# Patient Record
Sex: MALE | Race: OTHER | HISPANIC OR LATINO | Employment: FULL TIME | URBAN - METROPOLITAN AREA
[De-identification: names, ages, dates, MRNs, and addresses within clinical notes are randomized per-mention and may not be internally consistent; named-entity substitution may affect disease eponyms.]

---

## 2017-03-17 ENCOUNTER — OFFICE VISIT (OUTPATIENT)
Dept: MEDICAL GROUP | Age: 62
End: 2017-03-17
Payer: COMMERCIAL

## 2017-03-17 VITALS
WEIGHT: 190 LBS | HEIGHT: 68 IN | DIASTOLIC BLOOD PRESSURE: 82 MMHG | BODY MASS INDEX: 28.79 KG/M2 | HEART RATE: 72 BPM | SYSTOLIC BLOOD PRESSURE: 110 MMHG | OXYGEN SATURATION: 96 % | TEMPERATURE: 97.3 F

## 2017-03-17 DIAGNOSIS — E78.00 PURE HYPERCHOLESTEROLEMIA: ICD-10-CM

## 2017-03-17 DIAGNOSIS — J30.1 SEASONAL ALLERGIC RHINITIS DUE TO POLLEN: ICD-10-CM

## 2017-03-17 DIAGNOSIS — I10 ESSENTIAL HYPERTENSION: ICD-10-CM

## 2017-03-17 PROCEDURE — 99214 OFFICE O/P EST MOD 30 MIN: CPT | Performed by: INTERNAL MEDICINE

## 2017-03-17 RX ORDER — MOMETASONE FUROATE 50 UG/1
2 SPRAY, METERED NASAL DAILY
Qty: 1 INHALER | Refills: 3 | Status: SHIPPED | OUTPATIENT
Start: 2017-03-17 | End: 2018-03-21

## 2017-03-17 RX ORDER — FEXOFENADINE HCL 180 MG/1
180 TABLET ORAL DAILY
COMMUNITY
End: 2018-12-23

## 2017-03-17 ASSESSMENT — PAIN SCALES - GENERAL: PAINLEVEL: NO PAIN

## 2017-03-17 NOTE — PROGRESS NOTES
Subjective:   Karsten Jaime is a 61 y.o. male here today for evaluation and management of:      Seasonal allergic rhinitis due to pollen  Patient reported that he has allergy symptoms with runny nose, sinus congestion, postnasal drip and scratchy throat started last week. He reported mild dry cough from postnasal drip. He has allergy symptoms mostly in the spring and summertime. He started taking over-the-counter Allegra daily. He used to have Kenalog injection for allergy in the past. He has elevated fasting blood sugar and A1c 6 on 11/2/16. I explained to patient that Kenalog injection can increase blood sugar level and also has risks of decreased bone density if he takes Kenalog injection frequently. His allergic rhinitis can try with alternate methods first before considering having Kenalog injection. Patient completely agrees and he will try sinus rinse, Nasonex nasal spray, oral antihistamine.    Essential hypertension  His blood pressure improved lately. He is taking his blood pressure medications as instructed. He denies side effects from taking blood pressure medications.    Pure hypercholesterolemia  Patient is taking simvastatin 20 mg every afternoon. He denies side effects from taking simvastatin. His LDL at goal.         Current medicines (including changes today)  Current Outpatient Prescriptions   Medication Sig Dispense Refill   • mometasone (NASONEX) 50 MCG/ACT nasal spray Spray 2 Sprays in nose every day. 1 Inhaler 3   • fexofenadine (ALLEGRA ALLERGY) 180 MG tablet Take 180 mg by mouth every day.     • simvastatin (ZOCOR) 20 MG Tab Take 1 Tab by mouth every evening. 30 Tab 6   • valsartan (DIOVAN) 160 MG Tab Take 1 Tab by mouth every day. 30 Tab 6   • triamterene/hctz (MAXZIDE-25/DYAZIDE) 37.5-25 MG Cap Take 1 Cap by mouth every day. 30 Cap 6   • Ciclopirox 0.77 % Gel Apply once a day for great toes and fifth toes nails on both feet. 1 Tube 3   • pantoprazole (PROTONIX) 40 MG TBEC Take 40 mg  "by mouth every day.       No current facility-administered medications for this visit.     He  has a past medical history of GERD (gastroesophageal reflux disease); Hypertension; and Hyperlipidemia.    ROS   No chest pain, no shortness of breath, no abdominal pain       Objective:     Blood pressure 110/82, pulse 72, temperature 36.3 °C (97.3 °F), height 1.715 m (5' 7.5\"), weight 86.183 kg (190 lb), SpO2 96 %. Body mass index is 29.3 kg/(m^2).   Physical Exam:  General: Alert, oriented and no acute distress.  Eye contact is good, speech goal directed, affect calm  HEENT: conjunctiva non-injected, sclera non-icteric. Mild erythema and swelling on both nasal cavities with watery discharge.   Oral mucous membranes pink and moist with no lesions.  Pinna normal. TM pearly gray.   Neck No supraclavicular, submandibular, submental lymphadenopathy or masses in the neck or supraclavicular regions.  Lungs: Normal respiratory effort, clear to auscultation bilaterally with good excursion.  CV: regular rate and rhythm. No murmurs.  Abdomen: soft, non distended, nontender, Bowel sound normal.  Ext: no edema, color normal, vascularity normal, temperature normal        Assessment and Plan:   The following treatment plan was discussed     1. Seasonal allergic rhinitis due to pollen  - Discussed at length to rinse sinuses with over the counter nasal wash or Netti pot once or twice a day and then use flonase nasal spray once or twice a day after rinsing flonase nasal spray.  - Advised to try nasal steam therapy. Showed picture and discussed with patient how to do nasal steam therapy.   - He will take over-the-counter Allegra one tablet daily for 5-7 days.  - Advised to increase water intake.  - Suggest to use humidifier at home.  - mometasone (NASONEX) 50 MCG/ACT nasal spray; Spray 2 Sprays in nose every day.  Dispense: 1 Inhaler; Refill: 3    2. Essential hypertension  - Well-controlled. Continue current regimens. Recheck lab 1-2 " weeks before next follow up visit.    3. Pure hypercholesterolemia  - Well-controlled. Continue current regimens. Recheck lab 1-2 weeks before next follow up visit.  - Advised to eat low fat, low carbohydrate and high fiber diet as well as do cardio physical exercise regularly.            Followup: Return in about 8 weeks (around 5/10/2017), or if symptoms worsen or fail to improve, for hypertension, hyperlipidemia, GERD, impaired fasting glucose. Allergic rhinitis, Lab review.      Please note that this dictation was created using voice recognition software. I have made every reasonable attempt to correct obvious errors, but I expect that there may have unintended errors in text, spelling, punctuation, or grammar that I did not discover.

## 2017-03-17 NOTE — ASSESSMENT & PLAN NOTE
Patient reported that he has allergy symptoms with runny nose, sinus congestion, postnasal drip and scratchy throat started last week. He reported mild dry cough from postnasal drip. He has allergy symptoms mostly in the spring and summertime. He started taking over-the-counter Allegra daily. He used to have Kenalog injection for allergy in the past. He has elevated fasting blood sugar and A1c 6 on 11/2/16. I explained to patient that Kenalog injection can increase blood sugar level and also has risks of decreased bone density if he takes Kenalog injection frequently. His allergic rhinitis can try with alternate methods first before considering having Kenalog injection. Patient completely agrees and he will try sinus rinse, Nasonex nasal spray, oral antihistamine.

## 2017-03-17 NOTE — ASSESSMENT & PLAN NOTE
Patient is taking simvastatin 20 mg every afternoon. He denies side effects from taking simvastatin. His LDL at goal.

## 2017-03-17 NOTE — MR AVS SNAPSHOT
"Karsten Jaime   3/17/2017 9:40 AM   Office Visit   MRN: [id number]    Department:  [address] [place] [address]   Dept Phone:  [phone]    Description:  Male : 1955   Provider:  Eloisa Bolden M.D.           Reason for Visit     Allergic Rhinitis congestion, coughing    Hypertension     Hyperlipidemia           Allergies as of 3/17/2017     Allergen Noted Reactions    Padmini-[place] [Aspirin Effervescent] 2010       Aspirin 2010         You were diagnosed with     Seasonal allergic rhinitis due to pollen   [2871489]       Essential hypertension   [8369499]       Pure hypercholesterolemia   [272.0.ICD-9-CM]         Vital Signs     Blood Pressure Pulse Temperature Height Weight Body Mass Index    110/82 mmHg 72 36.3 °C (97.3 °F) 1.715 m (5' 7.5\") 86.183 kg (190 lb) 29.30 kg/m2    Oxygen Saturation Smoking Status                96% Never Smoker           Basic Information     Date Of Birth Sex Race Ethnicity Preferred Language    1955 Male  or   Origin (Faroese,Spanish,Croatian,Edwardo, etc) English      Your appointments     May 10, 2017  8:40 AM   Established Patient with Eloisa Bolden M.D.   [address] [address] [phone]           You will be receiving a confirmation call a few days before your appointment from our automated call confirmation system.              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   2014 - Present    Essential hypertension I10   2015 - Present    Gastroesophageal reflux disease without esophagitis K21.9   2016 - Present    Pure hypercholesterolemia E78.00   2016 - Present    Onychomycosis B35.1   2016 - Present    Seasonal allergic rhinitis due to pollen J30.1   5/10/2016 - Present    IFG (impaired fasting glucose) R73.01   5/10/2016 - Present    Elevated hematocrit R71.8  "  5/10/2016 - Present    Lymphocytosis D72.820   11/10/2016 - Present      Health Maintenance        Date Due Completion Dates    COLONOSCOPY 11/10/2017 11/10/2014    IMM DTaP/Tdap/Td Vaccine (2 - Td) 9/15/2024 9/15/2014            Current Immunizations     Influenza TIV (IM) 9/20/2016, 10/1/2014    Influenza Vaccine Quad Inj (Pf) 9/16/2015    Influenza Vaccine Quad Inj (Preserved) 10/9/2013, 10/19/2012, 10/12/2011    Tdap Vaccine 9/15/2014      Below and/or attached are the medications your provider expects you to take. Review all of your home medications and newly ordered medications with your provider and/or pharmacist. Follow medication instructions as directed by your provider and/or pharmacist. Please keep your medication list with you and share with your provider. Update the information when medications are discontinued, doses are changed, or new medications (including over-the-counter products) are added; and carry medication information at all times in the event of emergency situations     Allergies:  BETSY-SELTZER - (reactions not documented)     ASPIRIN - (reactions not documented)               Medications  Valid as of: March 17, 2017 - 11:22 AM    Generic Name Brand Name Tablet Size Instructions for use    Ciclopirox (Gel) Ciclopirox 0.77 % Apply once a day for great toes and fifth toes nails on both feet.        Fexofenadine HCl (Tab) ALLEGRA 180 MG Take 180 mg by mouth every day.        Mometasone Furoate (Suspension) NASONEX 50 MCG/ACT Spray 2 Sprays in nose every day.        Pantoprazole Sodium (Tablet Delayed Response) PROTONIX 40 MG Take 40 mg by mouth every day.        Simvastatin (Tab) ZOCOR 20 MG Take 1 Tab by mouth every evening.        Triamterene-HCTZ (Cap) MAXZIDE-25/DYAZIDE 37.5-25 MG Take 1 Cap by mouth every day.        Valsartan (Tab) DIOVAN 160 MG Take 1 Tab by mouth every day.        .                 Medicines prescribed today were sent to:     [id number] [place] [facility], [place] - [address]    [address] [place] [address]    Phone: [phone] Fax: [phone]    Open 24 Hours?: No      Medication refill instructions:       If your prescription bottle indicates you have medication refills left, it is not necessary to call your provider’s office. Please contact your pharmacy and they will refill your medication.    If your prescription bottle indicates you do not have any refills left, you may request refills at any time through one of the following ways: The online MicroCHIPS system (except Urgent Care), by calling your provider’s office, or by asking your pharmacy to contact your provider’s office with a refill request. Medication refills are processed only during regular business hours and may not be available until the next business day. Your provider may request additional information or to have a follow-up visit with you prior to refilling your medication.   *Please Note: Medication refills are assigned a new Rx number when refilled electronically. Your pharmacy may indicate that no refills were authorized even though a new prescription for the same medication is available at the pharmacy. Please request the medicine by name with the pharmacy before contacting your provider for a refill.           MicroCHIPS Access Code: [email or web address]  Expires: 3/31/2017  3:35 PM    MicroCHIPS  A secure, online tool to manage your health information     [employer]’s MicroCHIPS® is a secure, online tool that connects you to your personalized health information from the privacy of your home -- day or night - making it very easy for you to manage your healthcare. Once the activation process is completed, you can even access your medical information using the MicroCHIPS bessie, which is available for free in the Apple Bessie store or Google Play store.     MicroCHIPS provides the following levels of access (as shown below):   My Chart Features   [facility] Primary Care Doctor [facility]  Specialists  [facility]  Urgent  Care [facility]  Primary Care  Doctor   Email your healthcare team securely and privately 24/7 X X X    Manage appointments: schedule your next appointment; view details of past/upcoming appointments X      Request prescription refills. X      View recent personal medical records, including lab and immunizations X X X X   View health record, including health history, allergies, medications X X X X   Read reports about your outpatient visits, procedures, consult and ER notes X X X X   See your discharge summary, which is a recap of your hospital and/or ER visit that includes your diagnosis, lab results, and care plan. X X       How to register for FrameBuzz:  1. Go to  [email or web address].  2. Click on the Sign Up Now box, which takes you to the New Member Sign Up page. You will need to provide the following information:  a. Enter your FrameBuzz Access Code exactly as it appears at the top of this page. (You will not need to use this code after you’ve completed the sign-up process. If you do not sign up before the expiration date, you must request a new code.)   b. Enter your date of birth.   c. Enter your home email address.   d. Click Submit, and follow the next screen’s instructions.  3. Create a FrameBuzz ID. This will be your FrameBuzz login ID and cannot be changed, so think of one that is secure and easy to remember.  4. Create a FrameBuzz password. You can change your password at any time.  5. Enter your Password Reset Question and Answer. This can be used at a later time if you forget your password.   6. Enter your e-mail address. This allows you to receive e-mail notifications when new information is available in FrameBuzz.  7. Click Sign Up. You can now view your health information.    For assistance activating your FrameBuzz account, call [phone]

## 2017-03-17 NOTE — ASSESSMENT & PLAN NOTE
His blood pressure improved lately. He is taking his blood pressure medications as instructed. He denies side effects from taking blood pressure medications.

## 2017-04-16 DIAGNOSIS — E78.00 PURE HYPERCHOLESTEROLEMIA: ICD-10-CM

## 2017-04-16 DIAGNOSIS — I10 ESSENTIAL HYPERTENSION: ICD-10-CM

## 2017-04-17 RX ORDER — TRIAMTERENE AND HYDROCHLOROTHIAZIDE 37.5; 25 MG/1; MG/1
CAPSULE ORAL
Qty: 30 CAP | Refills: 11 | Status: SHIPPED | OUTPATIENT
Start: 2017-04-17 | End: 2018-04-11 | Stop reason: SDUPTHER

## 2017-04-17 RX ORDER — SIMVASTATIN 20 MG
TABLET ORAL
Qty: 30 TAB | Refills: 11 | Status: SHIPPED | OUTPATIENT
Start: 2017-04-17 | End: 2018-04-11 | Stop reason: SDUPTHER

## 2017-04-18 NOTE — TELEPHONE ENCOUNTER
Refill done.    Eloisa Bolden M.D.

## 2017-04-26 ENCOUNTER — HOSPITAL ENCOUNTER (OUTPATIENT)
Dept: LAB | Facility: MEDICAL CENTER | Age: 62
End: 2017-04-26
Attending: INTERNAL MEDICINE
Payer: COMMERCIAL

## 2017-04-26 DIAGNOSIS — I10 ESSENTIAL HYPERTENSION: ICD-10-CM

## 2017-04-26 DIAGNOSIS — R73.01 IFG (IMPAIRED FASTING GLUCOSE): ICD-10-CM

## 2017-04-26 DIAGNOSIS — D72.820 LYMPHOCYTOSIS: ICD-10-CM

## 2017-04-26 DIAGNOSIS — E78.00 PURE HYPERCHOLESTEROLEMIA: ICD-10-CM

## 2017-04-26 LAB
ALBUMIN SERPL BCP-MCNC: 3.8 G/DL (ref 3.2–4.9)
ALBUMIN/GLOB SERPL: 1.4 G/DL
ALP SERPL-CCNC: 74 U/L (ref 30–99)
ALT SERPL-CCNC: 50 U/L (ref 2–50)
ANION GAP SERPL CALC-SCNC: 4 MMOL/L (ref 0–11.9)
AST SERPL-CCNC: 37 U/L (ref 12–45)
BASOPHILS # BLD AUTO: 0.2 % (ref 0–1.8)
BASOPHILS # BLD: 0.01 K/UL (ref 0–0.12)
BILIRUB SERPL-MCNC: 1.3 MG/DL (ref 0.1–1.5)
BUN SERPL-MCNC: 19 MG/DL (ref 8–22)
CALCIUM SERPL-MCNC: 8.7 MG/DL (ref 8.5–10.5)
CHLORIDE SERPL-SCNC: 109 MMOL/L (ref 96–112)
CHOLEST SERPL-MCNC: 138 MG/DL (ref 100–199)
CO2 SERPL-SCNC: 27 MMOL/L (ref 20–33)
CREAT SERPL-MCNC: 0.93 MG/DL (ref 0.5–1.4)
EOSINOPHIL # BLD AUTO: 0.23 K/UL (ref 0–0.51)
EOSINOPHIL NFR BLD: 4.9 % (ref 0–6.9)
ERYTHROCYTE [DISTWIDTH] IN BLOOD BY AUTOMATED COUNT: 42.4 FL (ref 35.9–50)
EST. AVERAGE GLUCOSE BLD GHB EST-MCNC: 117 MG/DL
GFR SERPL CREATININE-BSD FRML MDRD: >60 ML/MIN/1.73 M 2
GLOBULIN SER CALC-MCNC: 2.7 G/DL (ref 1.9–3.5)
GLUCOSE SERPL-MCNC: 108 MG/DL (ref 65–99)
HAV IGM SERPL QL IA: NEGATIVE
HBA1C MFR BLD: 5.7 % (ref 0–5.6)
HBV CORE IGM SER QL: NEGATIVE
HBV SURFACE AG SER QL: NEGATIVE
HCT VFR BLD AUTO: 50 % (ref 42–52)
HCV AB SER QL: NEGATIVE
HDLC SERPL-MCNC: 38 MG/DL
HGB BLD-MCNC: 17.2 G/DL (ref 14–18)
IMM GRANULOCYTES # BLD AUTO: 0.01 K/UL (ref 0–0.11)
IMM GRANULOCYTES NFR BLD AUTO: 0.2 % (ref 0–0.9)
LDLC SERPL CALC-MCNC: 65 MG/DL
LYMPHOCYTES # BLD AUTO: 1.73 K/UL (ref 1–4.8)
LYMPHOCYTES NFR BLD: 37 % (ref 22–41)
MCH RBC QN AUTO: 30.3 PG (ref 27–33)
MCHC RBC AUTO-ENTMCNC: 34.4 G/DL (ref 33.7–35.3)
MCV RBC AUTO: 88 FL (ref 81.4–97.8)
MONOCYTES # BLD AUTO: 0.35 K/UL (ref 0–0.85)
MONOCYTES NFR BLD AUTO: 7.5 % (ref 0–13.4)
NEUTROPHILS # BLD AUTO: 2.34 K/UL (ref 1.82–7.42)
NEUTROPHILS NFR BLD: 50.2 % (ref 44–72)
NRBC # BLD AUTO: 0 K/UL
NRBC BLD AUTO-RTO: 0 /100 WBC
PLATELET # BLD AUTO: 189 K/UL (ref 164–446)
PMV BLD AUTO: 11 FL (ref 9–12.9)
POTASSIUM SERPL-SCNC: 3.6 MMOL/L (ref 3.6–5.5)
PROT SERPL-MCNC: 6.5 G/DL (ref 6–8.2)
RBC # BLD AUTO: 5.68 M/UL (ref 4.7–6.1)
SODIUM SERPL-SCNC: 140 MMOL/L (ref 135–145)
TRIGL SERPL-MCNC: 176 MG/DL (ref 0–149)
WBC # BLD AUTO: 4.7 K/UL (ref 4.8–10.8)

## 2017-04-26 PROCEDURE — 80061 LIPID PANEL: CPT

## 2017-04-26 PROCEDURE — 85025 COMPLETE CBC W/AUTO DIFF WBC: CPT

## 2017-04-26 PROCEDURE — 83036 HEMOGLOBIN GLYCOSYLATED A1C: CPT

## 2017-04-26 PROCEDURE — 80053 COMPREHEN METABOLIC PANEL: CPT

## 2017-04-26 PROCEDURE — 80074 ACUTE HEPATITIS PANEL: CPT

## 2017-04-26 PROCEDURE — 36415 COLL VENOUS BLD VENIPUNCTURE: CPT

## 2017-05-09 ENCOUNTER — TELEPHONE (OUTPATIENT)
Dept: MEDICAL GROUP | Age: 62
End: 2017-05-09

## 2017-05-09 NOTE — TELEPHONE ENCOUNTER
ESTABLISHED PATIENT PRE-VISIT PLANNING     Note: Patient will not be contacted if there is no indication to call.     1.  Reviewed note from last office visit with PCP and/or other med group provider: Yes    2.  If any orders were placed at last visit, do we have Results/Consult Notes?        •  Labs - Labs ordered, completed and results are in chart.       •  Imaging - Imaging was not ordered at last office visit.       •  Referrals - No referrals were ordered at last office visit.    3.  Immunizations were updated in [facility] using WebIZ?: Yes       •  Web Iz Recommendations: HEPATITIS A  HEPATITIS B MMR  ZOSTAVAX (Shingles)    4.  Patient is due for the following Health Maintenance Topics:   Health Maintenance Due   Topic Date Due   • IMM ZOSTER VACCINE  04/21/2017       - Patient has completed FLU and TDAP Immunization(s) per WebIZ. Chart has been updated.    5.  Patient was not informed to arrive 15 min prior to their scheduled appointment and bring in their medication bottles.

## 2017-05-10 ENCOUNTER — OFFICE VISIT (OUTPATIENT)
Dept: MEDICAL GROUP | Age: 62
End: 2017-05-10
Payer: COMMERCIAL

## 2017-05-10 VITALS
HEIGHT: 69 IN | TEMPERATURE: 98.1 F | OXYGEN SATURATION: 94 % | HEART RATE: 91 BPM | SYSTOLIC BLOOD PRESSURE: 132 MMHG | BODY MASS INDEX: 27.99 KG/M2 | DIASTOLIC BLOOD PRESSURE: 82 MMHG | WEIGHT: 189 LBS

## 2017-05-10 DIAGNOSIS — E78.00 PURE HYPERCHOLESTEROLEMIA: ICD-10-CM

## 2017-05-10 DIAGNOSIS — K21.9 GASTROESOPHAGEAL REFLUX DISEASE WITHOUT ESOPHAGITIS: ICD-10-CM

## 2017-05-10 DIAGNOSIS — R73.01 IFG (IMPAIRED FASTING GLUCOSE): ICD-10-CM

## 2017-05-10 DIAGNOSIS — I10 ESSENTIAL HYPERTENSION: ICD-10-CM

## 2017-05-10 DIAGNOSIS — Z23 NEED FOR SHINGLES VACCINE: ICD-10-CM

## 2017-05-10 PROCEDURE — 90471 IMMUNIZATION ADMIN: CPT | Performed by: INTERNAL MEDICINE

## 2017-05-10 PROCEDURE — 90736 HZV VACCINE LIVE SUBQ: CPT | Performed by: INTERNAL MEDICINE

## 2017-05-10 PROCEDURE — 99214 OFFICE O/P EST MOD 30 MIN: CPT | Mod: 25 | Performed by: INTERNAL MEDICINE

## 2017-05-10 ASSESSMENT — PAIN SCALES - GENERAL: PAINLEVEL: NO PAIN

## 2017-05-10 NOTE — ASSESSMENT & PLAN NOTE
Patient has elevated fasting sugar 108 and A1c was 5.7 on 4/26/17. Patient eats a lot of cheese and carbohydrate. He is not able to follow diabetes diet very well. He wants to try to eat more healthy diet and will start regular exercise.

## 2017-05-10 NOTE — ASSESSMENT & PLAN NOTE
Patient is taking Diovan 160 mg daily, triamterene/hydrochlorothiazide 37.5/25 mg daily. His blood pressure is well controlled with current regimens. His kidney function and electrolytes are within normal on 4/26-17.

## 2017-05-10 NOTE — ASSESSMENT & PLAN NOTE
Patient is taking simvastatin 20 mg every evening. He denies side effects from taking it. His LDL at goal but triglycerides increased. He reported eating a lot of cheese. He also drinks 2-3 beer on the weekends. We will continue simvastatin 20 mg daily and will add omega 3 1200 mg 3 times a day. He also needs to cut down cheese and eat low-fat diet and has regular exercise.

## 2017-05-10 NOTE — MR AVS SNAPSHOT
"Karsten Jaime   5/10/2017 8:40 AM   Office Visit   MRN: [id number]    Department:  [address] [place] [address]   Dept Phone:  [phone]    Description:  Male : 1955   Provider:  Eloisa Bolden M.D.           Reason for Visit     Hypertension lab review    Hyperlipidemia     Gastrophageal Reflux           Allergies as of 5/10/2017     Allergen Noted Reactions    Padmini-[place] [Aspirin Effervescent] 2010       Aspirin 2010         You were diagnosed with     Essential hypertension   [8812748]       Gastroesophageal reflux disease without esophagitis   [897901]       IFG (impaired fasting glucose)   [832070]       Pure hypercholesterolemia   [272.0.ICD-9-CM]       Need for shingles vaccine   [077369]         Vital Signs     Blood Pressure Pulse Temperature Height Weight Body Mass Index    132/82 mmHg 91 36.7 °C (98.1 °F) 1.753 m (5' 9\") 85.73 kg (189 lb) 27.90 kg/m2    Oxygen Saturation Smoking Status                94% Never Smoker           Basic Information     Date Of Birth Sex Race Ethnicity Preferred Language    1955 Male  or   Origin (Cypriot,American,Burundian,Edwardo, etc) English      Your appointments     Nov 15, 2017  8:40 AM   Established Patient with Eloisa Bolden M.D.   [address] [id number] [phone]           You will be receiving a confirmation call a few days before your appointment from our automated call confirmation system.              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   2014 - Present    Essential hypertension I10   2015 - Present    Gastroesophageal reflux disease without esophagitis K21.9   2016 - Present    Pure hypercholesterolemia E78.00   2016 - Present    Onychomycosis B35.1   2016 - Present    Seasonal allergic rhinitis due to pollen J30.1   5/10/2016 - Present   "    IFG (impaired fasting glucose) R73.01   5/10/2016 - Present    Elevated hematocrit R71.8   5/10/2016 - Present    Lymphocytosis D72.820   11/10/2016 - Present      Health Maintenance        Date Due Completion Dates    IMM ZOSTER VACCINE 4/21/2017 ---    COLONOSCOPY 11/10/2017 11/10/2014    IMM DTaP/Tdap/Td Vaccine (2 - Td) 9/15/2024 9/15/2014            Current Immunizations     Influenza TIV (IM) 9/20/2016, 10/1/2014    Influenza Vaccine Quad Inj (Pf) 9/16/2015    Influenza Vaccine Quad Inj (Preserved) 10/9/2013, 10/19/2012, 10/12/2011    SHINGLES VACCINE  Incomplete    Tdap Vaccine 9/15/2014      Below and/or attached are the medications your provider expects you to take. Review all of your home medications and newly ordered medications with your provider and/or pharmacist. Follow medication instructions as directed by your provider and/or pharmacist. Please keep your medication list with you and share with your provider. Update the information when medications are discontinued, doses are changed, or new medications (including over-the-counter products) are added; and carry medication information at all times in the event of emergency situations     Allergies:  BETSY-SELTZER - (reactions not documented)     ASPIRIN - (reactions not documented)               Medications  Valid as of: May 10, 2017 -  9:32 AM    Generic Name Brand Name Tablet Size Instructions for use    Ciclopirox (Gel) Ciclopirox 0.77 % Apply once a day for great toes and fifth toes nails on both feet.        Fexofenadine HCl (Tab) ALLEGRA 180 MG Take 180 mg by mouth every day.        Mometasone Furoate (Suspension) NASONEX 50 MCG/ACT Spray 2 Sprays in nose every day.        Pantoprazole Sodium (Tablet Delayed Response) PROTONIX 40 MG Take 40 mg by mouth every day.        Simvastatin (Tab) ZOCOR 20 MG TAKE 1 TAB BY MOUTH EVERY EVENING.        Triamterene-HCTZ (Cap) MAXZIDE-25/DYAZIDE 37.5-25 MG TAKE 1 CAPSULE BY MOUTH EVERY DAY.        Valsartan  (Tab) DIOVAN 160 MG Take 1 Tab by mouth every day.        .                 Medicines prescribed today were sent to:     [facility] [place] [facility], [place] - [address]    [address] [place] [address]    Phone: [phone] Fax: [phone]    Open 24 Hours?: No      Medication refill instructions:       If your prescription bottle indicates you have medication refills left, it is not necessary to call your provider’s office. Please contact your pharmacy and they will refill your medication.    If your prescription bottle indicates you do not have any refills left, you may request refills at any time through one of the following ways: The online Postachio system (except Urgent Care), by calling your provider’s office, or by asking your pharmacy to contact your provider’s office with a refill request. Medication refills are processed only during regular business hours and may not be available until the next business day. Your provider may request additional information or to have a follow-up visit with you prior to refilling your medication.   *Please Note: Medication refills are assigned a new Rx number when refilled electronically. Your pharmacy may indicate that no refills were authorized even though a new prescription for the same medication is available at the pharmacy. Please request the medicine by name with the pharmacy before contacting your provider for a refill.        Your To Do List     Future Labs/Procedures Complete By Expires    CBC WITH DIFFERENTIAL  As directed 5/11/2018    COMP METABOLIC PANEL  As directed 5/11/2018    HEMOGLOBIN A1C  As directed 5/11/2018    LIPID PROFILE  As directed 5/11/2018         Postachio Status: Patient Declined

## 2017-05-10 NOTE — PROGRESS NOTES
Subjective:   Karsten Jaime is a 62 y.o. male here today for evaluation and management of:      Essential hypertension  Patient is taking Diovan 160 mg daily, triamterene/hydrochlorothiazide 37.5/25 mg daily. His blood pressure is well controlled with current regimens. His kidney function and electrolytes are within normal on 4/26-17.    Gastroesophageal reflux disease without esophagitis  Patient is taking pantoprazole 40 mg daily. His symptoms is well controlled with current regimens. He denies side effects from taking pantoprazole. He did the EGD with digestive [facility] on 5/3/17 that showed that he has mild inflammation in his stomach and has a hiatal hernia. He was told to continue pantoprazole daily and recommend to follow-up with them in one year. He has colonoscopy in November 2014 and had some polyps removed. He was told to repeat colonoscopy in 3 years that will be due in November 2017.    IFG (impaired fasting glucose)  Patient has elevated fasting sugar 108 and A1c was 5.7 on 4/26/17. Patient eats a lot of cheese and carbohydrate. He is not able to follow diabetes diet very well. He wants to try to eat more healthy diet and will start regular exercise.    Pure hypercholesterolemia  Patient is taking simvastatin 20 mg every evening. He denies side effects from taking it. His LDL at goal but triglycerides increased. He reported eating a lot of cheese. He also drinks 2-3 beer on the weekends. We will continue simvastatin 20 mg daily and will add omega 3 1200 mg 3 times a day. He also needs to cut down cheese and eat low-fat diet and has regular exercise.         Current medicines (including changes today)  Current Outpatient Prescriptions   Medication Sig Dispense Refill   • triamterene/hctz (MAXZIDE-25/DYAZIDE) 37.5-25 MG Cap TAKE 1 CAPSULE BY MOUTH EVERY DAY. 30 Cap 11   • simvastatin (ZOCOR) 20 MG Tab TAKE 1 TAB BY MOUTH EVERY EVENING. 30 Tab 11   • mometasone (NASONEX) 50 MCG/ACT nasal spray Spray 2  "Sprays in nose every day. 1 Inhaler 3   • fexofenadine (ALLEGRA ALLERGY) 180 MG tablet Take 180 mg by mouth every day.     • valsartan (DIOVAN) 160 MG Tab Take 1 Tab by mouth every day. 30 Tab 6   • Ciclopirox 0.77 % Gel Apply once a day for great toes and fifth toes nails on both feet. 1 Tube 3   • pantoprazole (PROTONIX) 40 MG TBEC Take 40 mg by mouth every day.       No current facility-administered medications for this visit.     He  has a past medical history of GERD (gastroesophageal reflux disease); Hypertension; and Hyperlipidemia.    ROS   No chest pain, no shortness of breath, no abdominal pain       Objective:     Blood pressure 132/82, pulse 91, temperature 36.7 °C (98.1 °F), height 1.753 m (5' 9\"), weight 85.73 kg (189 lb), SpO2 94 %. Body mass index is 27.9 kg/(m^2).   Physical Exam:  General: Alert, oriented and no acute distress.  Eye contact is good, speech goal directed, affect calm  HEENT: conjunctiva non-injected, sclera non-icteric.  Oral mucous membranes pink and moist with no lesions.  Pinna normal.  Lungs: Normal respiratory effort, clear to auscultation bilaterally with good excursion.  CV: regular rate and rhythm. No murmurs.  Abdomen: soft, non distended, nontender, Bowel sound normal.  Ext: no edema, color normal, vascularity normal, temperature normal      Assessment and Plan:   The following treatment plan was discussed     1. Essential hypertension  - Well-controlled. Continue current regimens. Recheck lab 1-2 weeks before next follow up visit.  - Recommend to monitor blood pressure and heart rate at home.  - Advised to avoid alcohol  - CBC WITH DIFFERENTIAL; Future  - COMP METABOLIC PANEL; Future    2. Gastroesophageal reflux disease without esophagitis  - Well-controlled. Continue current regimens. Recheck lab 1-2 weeks before next follow up visit.  - Review EGD report and colonoscopy report with patient today. Recommend to avoid alcohol, acidic food and spicy food.  - COMP METABOLIC " PANEL; Future    3. IFG (impaired fasting glucose)  - Advised to eat low fat, low carbohydrate and high fiber diet as well as do cardio physical exercise regularly.   - COMP METABOLIC PANEL; Future  - HEMOGLOBIN A1C; Future    4. Pure hypercholesterolemia  - LDL at goal but triglycerides remains high. We will continue simvastatin 20 mg every afternoon. Recommend to take Omega 3.  - COMP METABOLIC PANEL; Future  - LIPID PROFILE; Future    5. Need for shingles vaccine  - Shingles vaccine was given today after reviewing risks and benefits as well as side effects of vaccine.  - VARICELLA ZOSTER VACCINE SQ      Followup: Return in about 6 months (around 11/10/2017), or if symptoms worsen or fail to improve, for hypertension, hypercholesterolemia, GERD, lab review.      Please note that this dictation was created using voice recognition software. I have made every reasonable attempt to correct obvious errors, but I expect that there may have unintended errors in text, spelling, punctuation, or grammar that I did not discover.

## 2017-05-10 NOTE — ASSESSMENT & PLAN NOTE
Patient is taking pantoprazole 40 mg daily. His symptoms is well controlled with current regimens. He denies side effects from taking pantoprazole. He did the EGD with [facility] on 5/3/17 that showed that he has mild inflammation in his stomach and has a hiatal hernia. He was told to continue pantoprazole daily and recommend to follow-up with them in one year. He has colonoscopy in November 2014 and had some polyps removed. He was told to repeat colonoscopy in 3 years that will be due in November 2017.

## 2017-06-19 DIAGNOSIS — I10 ESSENTIAL HYPERTENSION: ICD-10-CM

## 2017-06-19 RX ORDER — VALSARTAN 160 MG/1
TABLET ORAL
Qty: 30 TAB | Refills: 12 | Status: SHIPPED | OUTPATIENT
Start: 2017-06-19 | End: 2018-06-13 | Stop reason: SDUPTHER

## 2017-06-19 NOTE — TELEPHONE ENCOUNTER
Refill done.    Eloisa Bolden M.D.

## 2017-07-24 ENCOUNTER — HOSPITAL ENCOUNTER (OUTPATIENT)
Facility: MEDICAL CENTER | Age: 62
End: 2017-07-24
Attending: OPHTHALMOLOGY | Admitting: OPHTHALMOLOGY
Payer: COMMERCIAL

## 2017-07-24 VITALS
HEART RATE: 64 BPM | RESPIRATION RATE: 16 BRPM | HEIGHT: 69 IN | SYSTOLIC BLOOD PRESSURE: 129 MMHG | BODY MASS INDEX: 28.41 KG/M2 | DIASTOLIC BLOOD PRESSURE: 82 MMHG | OXYGEN SATURATION: 93 % | WEIGHT: 191.8 LBS | TEMPERATURE: 99 F

## 2017-07-24 PROBLEM — H25.11 AGE-RELATED NUCLEAR CATARACT OF RIGHT EYE: Status: ACTIVE | Noted: 2017-07-24

## 2017-07-24 PROCEDURE — 500792 HCHG KNIFE, SLIT 2.75 DUAL BEVEL ANGL: Performed by: OPHTHALMOLOGY

## 2017-07-24 PROCEDURE — 700101 HCHG RX REV CODE 250

## 2017-07-24 PROCEDURE — 160048 HCHG OR STATISTICAL LEVEL 1-5: Performed by: OPHTHALMOLOGY

## 2017-07-24 PROCEDURE — 502240 HCHG MISC OR SUPPLY RC 0272: Performed by: OPHTHALMOLOGY

## 2017-07-24 PROCEDURE — 500882 HCHG PACK, EYE CUSTOM CATARACT: Performed by: OPHTHALMOLOGY

## 2017-07-24 PROCEDURE — 501749 HCHG SHELL REV 276: Performed by: OPHTHALMOLOGY

## 2017-07-24 PROCEDURE — 99153 MOD SED SAME PHYS/QHP EA: CPT | Performed by: OPHTHALMOLOGY

## 2017-07-24 PROCEDURE — 99152 MOD SED SAME PHYS/QHP 5/>YRS: CPT | Performed by: OPHTHALMOLOGY

## 2017-07-24 PROCEDURE — 500073 HCHG BLADE, BEAVER (EYE): Performed by: OPHTHALMOLOGY

## 2017-07-24 PROCEDURE — 501539 HCHG TIP, PHACO: Performed by: OPHTHALMOLOGY

## 2017-07-24 PROCEDURE — 160029 HCHG SURGERY MINUTES - 1ST 30 MINS LEVEL 4: Performed by: OPHTHALMOLOGY

## 2017-07-24 PROCEDURE — 700111 HCHG RX REV CODE 636 W/ 250 OVERRIDE (IP)

## 2017-07-24 PROCEDURE — 160002 HCHG RECOVERY MINUTES (STAT): Performed by: OPHTHALMOLOGY

## 2017-07-24 PROCEDURE — 500855 HCHG NEEDLE, IRRIG CYSTOTOME 27G: Performed by: OPHTHALMOLOGY

## 2017-07-24 PROCEDURE — 160035 HCHG PACU - 1ST 60 MINS PHASE I: Performed by: OPHTHALMOLOGY

## 2017-07-24 DEVICE — IMPLANTABLE DEVICE: Type: IMPLANTABLE DEVICE | Status: FUNCTIONAL

## 2017-07-24 RX ORDER — APRACLONIDINE HYDROCHLORIDE 5 MG/ML
SOLUTION/ DROPS OPHTHALMIC
Status: DISCONTINUED | OUTPATIENT
Start: 2017-07-24 | End: 2017-07-24 | Stop reason: HOSPADM

## 2017-07-24 RX ORDER — PROPARACAINE HYDROCHLORIDE 5 MG/ML
SOLUTION/ DROPS OPHTHALMIC
Status: COMPLETED
Start: 2017-07-24 | End: 2017-07-24

## 2017-07-24 RX ORDER — SODIUM CHLORIDE, SODIUM LACTATE, POTASSIUM CHLORIDE, CALCIUM CHLORIDE 600; 310; 30; 20 MG/100ML; MG/100ML; MG/100ML; MG/100ML
1000 INJECTION, SOLUTION INTRAVENOUS
Status: DISCONTINUED | OUTPATIENT
Start: 2017-07-24 | End: 2017-07-24

## 2017-07-24 RX ORDER — PHENYLEPHRINE HYDROCHLORIDE 25 MG/ML
SOLUTION/ DROPS OPHTHALMIC
Status: COMPLETED
Start: 2017-07-24 | End: 2017-07-24

## 2017-07-24 RX ORDER — SODIUM CHLORIDE, SODIUM LACTATE, POTASSIUM CHLORIDE, CALCIUM CHLORIDE 600; 310; 30; 20 MG/100ML; MG/100ML; MG/100ML; MG/100ML
1000 INJECTION, SOLUTION INTRAVENOUS
Status: COMPLETED | OUTPATIENT
Start: 2017-07-24 | End: 2017-07-24

## 2017-07-24 RX ORDER — PROPARACAINE HYDROCHLORIDE 5 MG/ML
SOLUTION/ DROPS OPHTHALMIC
Status: DISCONTINUED | OUTPATIENT
Start: 2017-07-24 | End: 2017-07-24 | Stop reason: HOSPADM

## 2017-07-24 RX ORDER — LIDOCAINE HYDROCHLORIDE 10 MG/ML
INJECTION, SOLUTION EPIDURAL; INFILTRATION; INTRACAUDAL; PERINEURAL
Status: DISCONTINUED | OUTPATIENT
Start: 2017-07-24 | End: 2017-07-24 | Stop reason: HOSPADM

## 2017-07-24 RX ORDER — CYCLOPENTOLATE HYDROCHLORIDE 10 MG/ML
SOLUTION/ DROPS OPHTHALMIC
Status: COMPLETED
Start: 2017-07-24 | End: 2017-07-24

## 2017-07-24 RX ORDER — APRACLONIDINE HYDROCHLORIDE 5 MG/ML
SOLUTION/ DROPS OPHTHALMIC
Status: DISCONTINUED
Start: 2017-07-24 | End: 2017-07-24 | Stop reason: HOSPADM

## 2017-07-24 RX ORDER — MOXIFLOXACIN 5 MG/ML
SOLUTION/ DROPS OPHTHALMIC
Status: DISCONTINUED | OUTPATIENT
Start: 2017-07-24 | End: 2017-07-24 | Stop reason: HOSPADM

## 2017-07-24 RX ADMIN — SODIUM CHLORIDE, SODIUM LACTATE, POTASSIUM CHLORIDE, CALCIUM CHLORIDE 1000 ML: 600; 310; 30; 20 INJECTION, SOLUTION INTRAVENOUS at 06:45

## 2017-07-24 RX ADMIN — PROPARACAINE HYDROCHLORIDE 1 DROP: 5 SOLUTION/ DROPS OPHTHALMIC at 06:25

## 2017-07-24 RX ADMIN — CYCLOPENTOLATE HYDROCHLORIDE 1 DROP: 10 SOLUTION OPHTHALMIC at 06:25

## 2017-07-24 RX ADMIN — PHENYLEPHRINE HYDROCHLORIDE 1 DROP: 2.5 SOLUTION/ DROPS OPHTHALMIC at 06:25

## 2017-07-24 ASSESSMENT — PAIN SCALES - GENERAL: PAINLEVEL_OUTOF10: 0

## 2017-07-24 NOTE — DISCHARGE INSTRUCTIONS
HOME CARE INSTRUCTIONS FOR CATARACT SURGERY    ACTIVITY: Rest and take it easy for the first 24 hours. We strongly suggest that a responsible adult remain with you during that time. It is normal to feel sleepy. We encourage you to not do anything that requires balance, judgment or coordination. Be extra careful when walking (with a dilated eye, it is easier to trip and fall).     FOR 24 HOURS, DO NOT:       Drive, operate machinery or run household appliances.        Drink beer or alcoholic beverages.        Make important decisions or sign legal documents.     DIET: To avoid nausea, slowly advance diet as tolerated, avoiding spicy or greasy foods for the first meal.     MEDICATIONS: Resume taking daily medication. You may take Tylenol for mild discomfort, if needed.     SURGICAL DRESSING: Eye shield as instructed by your doctor. Dark glasses should be worn while in the sunlight.     Follow your Physician's instruction Sheet. Eye Kit Given    A follow-up appointment is scheduled with your doctor tomorrow at _1:00 PM______ am/pm.     You should call 911 if you develop problems with breathing or chest pain.  You should CALL YOUR PHYSICIAN if you develop: Sharp stabbing pain or sudden change in vision in your operative eye. If you are unable to contact your doctor or the surgical center, you should go to the nearest emergency room or urgent care center. Physician's telephone # ____[phone]______________________    If any questions arise, call your doctor. If your doctor is not available, please feel free to call Same Day Surgery at [phone]. You can also call the [employer] Hotline open 24 hours/day, 7 days/week and speak to a nurse at [phone] or [phone].     I acknowledge receipt and understanding of these Home Care Instructions.    ______________________________     _______________________________            Signature of Patient / Responsible Adult                                                       RN  Signature    A registered nurse may call you a few days after your surgery to see how you are doing.   You may also receive a survey in the mail within the next two weeks and we ask that you take a few moments to complete and return the survey. Our goal is to provide you with very good care and we value your comments. Thank you for choosing [facility].

## 2017-07-24 NOTE — IP AVS SNAPSHOT
7/24/2017    Karsten Jaime  [address] [place] [address]    Dear Karsten:    [facility] wants to ensure your discharge home is safe and you or your loved ones have had all of your questions answered regarding your care after you leave the hospital.    Below is a list of resources and contact information should you have any questions regarding your hospital stay, follow-up instructions, or active medical symptoms.    Questions or Concerns Regarding… Contact   Medical Questions Related to Your Discharge  (7 days a week, 8am-5pm) Contact a Nurse Care Coordinator   [phone]   Medical Questions Not Related to Your Discharge  (24 hours a day / 7 days a week)  Contact the Nurse Health Line   [phone]    Medications or Discharge Instructions Refer to your discharge packet   or contact your [facility] Primary Care Provider   [phone]   Follow-up Appointment(s) Schedule your appointment via [employer]   or contact Scheduling [phone]   Billing Review your statement via [employer]  or contact Billing [phone]   Medical Records Review your records via [employer]   or contact Medical Records [phone]     You may receive a telephone call within two days of discharge. This call is to make certain you understand your discharge instructions and have the opportunity to have any questions answered. You can also easily access your medical information, test results and upcoming appointments via the [employer] free online health management tool. You can learn more and sign up at [employer]. For assistance setting up your [employer] account, please call [phone].    Once again, we want to ensure your discharge home is safe and that you have a clear understanding of any next steps in your care. If you have any questions or concerns, please do not hesitate to contact us, we are here for you. Thank you for choosing [facility] for your healthcare needs.    Sincerely,    Your [facility] Healthcare Team

## 2017-07-24 NOTE — IP AVS SNAPSHOT
" Home Care Instructions                                                                                                                Name:Karsten Jaime  Medical Record Number:[id number]  CSN: [id number]    YOB: 1955   Age: 62 y.o.  Sex: male  HT:1.753 m (5' 9\") WT: 87 kg (191 lb 12.8 oz)          Admit Date: 7/24/2017     Discharge Date:   Today's Date: 7/24/2017  Attending Doctor:  Dwayne Ching M.D.                  Allergies:  Padmini-seltzer and Aspirin                Discharge Instructions       HOME CARE INSTRUCTIONS FOR CATARACT SURGERY    ACTIVITY: Rest and take it easy for the first 24 hours. We strongly suggest that a responsible adult remain with you during that time. It is normal to feel sleepy. We encourage you to not do anything that requires balance, judgment or coordination. Be extra careful when walking (with a dilated eye, it is easier to trip and fall).     FOR 24 HOURS, DO NOT:       Drive, operate machinery or run household appliances.        Drink beer or alcoholic beverages.        Make important decisions or sign legal documents.     DIET: To avoid nausea, slowly advance diet as tolerated, avoiding spicy or greasy foods for the first meal.     MEDICATIONS: Resume taking daily medication. You may take Tylenol for mild discomfort, if needed.     SURGICAL DRESSING: Eye shield as instructed by your doctor. Dark glasses should be worn while in the sunlight.     Follow your Physician's instruction Sheet. Eye Kit Given    A follow-up appointment is scheduled with your doctor tomorrow at _1:00 PM______ am/pm.     You should call 911 if you develop problems with breathing or chest pain.  You should CALL YOUR PHYSICIAN if you develop: Sharp stabbing pain or sudden change in vision in your operative eye. If you are unable to contact your doctor or the surgical center, you should go to the nearest emergency room or urgent care center. Physician's telephone # " ____[phone]______________________    If any questions arise, call your doctor. If your doctor is not available, please feel free to call Same Day Surgery at [phone]. You can also call the Health Hotline open 24 hours/day, 7 days/week and speak to a nurse at [phone] or [phone].     I acknowledge receipt and understanding of these Home Care Instructions.    ______________________________     _______________________________            Signature of Patient / Responsible Adult                                                       RN Signature    A registered nurse may call you a few days after your surgery to see how you are doing.   You may also receive a survey in the mail within the next two weeks and we ask that you take a few moments to complete and return the survey. Our goal is to provide you with very good care and we value your comments. Thank you for choosing [facility].        Medication List      ASK your doctor about these medications        Instructions    Morning Afternoon Evening Bedtime    ALLEGRA ALLERGY 180 MG tablet   Generic drug:  fexofenadine        Take 180 mg by mouth every day.   Dose:  180 mg                        Ciclopirox 0.77 % Gel        Apply once a day for great toes and fifth toes nails on both feet.                        mometasone 50 MCG/ACT nasal spray   Commonly known as:  NASONEX        Spray 2 Sprays in nose every day.   Dose:  2 Spray                        pantoprazole 40 MG Tbec   Commonly known as:  PROTONIX        Take 40 mg by mouth every day.   Dose:  40 mg                        simvastatin 20 MG Tabs   Commonly known as:  ZOCOR        TAKE 1 TAB BY MOUTH EVERY EVENING.                        triamterene/hctz 37.5-25 MG Caps   Commonly known as:  MAXZIDE-25/DYAZIDE        TAKE 1 CAPSULE BY MOUTH EVERY DAY.                        valsartan 160 MG Tabs   Commonly known as:  DIOVAN        TAKE 1 TABLET BY MOUTH EVERY DAY.                               Medication Information     Above and/or attached are the medications your physician expects you to take upon discharge. Review all of your home medications and newly ordered medications with your doctor and/or pharmacist. Follow medication instructions as directed by your doctor and/or pharmacist. Please keep your medication list with you and share with your physician. Update the information when medications are discontinued, doses are changed, or new medications (including over-the-counter products) are added; and carry medication information at all times in the event of emergency situations.        Resources     Quit Smoking / Tobacco Use:    I understand the use of any tobacco products increases my chance of suffering from future heart disease or stroke and could cause other illnesses which may shorten my life. Quitting the use of tobacco products is the single most important thing I can do to improve my health. For further information on smoking / tobacco cessation call a Toll Free Quit Line at 1-186.424.3033 (*[employer] [place]) or 1-662.772.5369 (American [employer]) or you can access the web based program at www.lungRoot4.org.    [place] Tobacco Users Help Line:  (640) 521-3920       Toll Free: 1-574.937.2766  Quit Tobacco Program [facility] (102)138-3388    Crisis Hotline:    [place] Crisis Hotline:  0-666-SXNQDOG or 1-910.652.6737    [place] Crisis Hotline:    1-337.998.7897 or 001-635-4104    Discharge Survey:   Thank you for choosing [facility]. We hope we did everything we could to make your hospital stay a pleasant one. You may be receiving a survey and we would appreciate your time and participation in answering the questions. Your input is very valuable to us in our efforts to improve our service to our patients and their families.            Signatures     My signature on this form indicates that:    1. I acknowledge receipt and understanding of  these Home Care Instruction.  2. My questions regarding this information have been answered to my satisfaction.  3. I have formulated a plan with my discharge nurse to obtain my prescribed medications for home.    __________________________________      __________________________________                   Patient Signature                                 Guardian/Responsible Adult Signature      __________________________________                 __________       ________                       Nurse Signature                                               Date                 Time

## 2017-07-24 NOTE — OR NURSING
0812  RECEIVED PATIENT FROM OR.  REPORT FROM DR. GARCIA.  RESPIRATIONS ARE EVEN AND UNLABORED.  OPERATIVE EYE DILATED.  TAKING PO WITHOUT DIFFICULTY.  INSTRUCTIONS GIVEN.  EYE KIT GIVEN.

## 2017-08-14 ENCOUNTER — HOSPITAL ENCOUNTER (OUTPATIENT)
Facility: MEDICAL CENTER | Age: 62
End: 2017-08-14
Attending: OPHTHALMOLOGY | Admitting: OPHTHALMOLOGY
Payer: COMMERCIAL

## 2017-08-14 VITALS
HEIGHT: 69 IN | HEART RATE: 52 BPM | OXYGEN SATURATION: 98 % | TEMPERATURE: 98.7 F | RESPIRATION RATE: 16 BRPM | WEIGHT: 192.24 LBS | BODY MASS INDEX: 28.47 KG/M2

## 2017-08-14 PROBLEM — H25.10 NUCLEAR SCLEROSIS: Status: ACTIVE | Noted: 2017-08-14

## 2017-08-14 PROCEDURE — 501836 HCHG SUTURE EYE: Performed by: OPHTHALMOLOGY

## 2017-08-14 PROCEDURE — 700101 HCHG RX REV CODE 250

## 2017-08-14 PROCEDURE — 501749 HCHG SHELL REV 276: Performed by: OPHTHALMOLOGY

## 2017-08-14 PROCEDURE — 160002 HCHG RECOVERY MINUTES (STAT): Performed by: OPHTHALMOLOGY

## 2017-08-14 PROCEDURE — 501539 HCHG TIP, PHACO: Performed by: OPHTHALMOLOGY

## 2017-08-14 PROCEDURE — 160029 HCHG SURGERY MINUTES - 1ST 30 MINS LEVEL 4: Performed by: OPHTHALMOLOGY

## 2017-08-14 PROCEDURE — 500882 HCHG PACK, EYE CUSTOM CATARACT: Performed by: OPHTHALMOLOGY

## 2017-08-14 PROCEDURE — 500792 HCHG KNIFE, SLIT 2.75 DUAL BEVEL ANGL: Performed by: OPHTHALMOLOGY

## 2017-08-14 PROCEDURE — 700111 HCHG RX REV CODE 636 W/ 250 OVERRIDE (IP)

## 2017-08-14 PROCEDURE — 99152 MOD SED SAME PHYS/QHP 5/>YRS: CPT | Performed by: OPHTHALMOLOGY

## 2017-08-14 PROCEDURE — 160035 HCHG PACU - 1ST 60 MINS PHASE I: Performed by: OPHTHALMOLOGY

## 2017-08-14 PROCEDURE — 99153 MOD SED SAME PHYS/QHP EA: CPT | Performed by: OPHTHALMOLOGY

## 2017-08-14 PROCEDURE — 500073 HCHG BLADE, BEAVER (EYE): Performed by: OPHTHALMOLOGY

## 2017-08-14 PROCEDURE — 160048 HCHG OR STATISTICAL LEVEL 1-5: Performed by: OPHTHALMOLOGY

## 2017-08-14 PROCEDURE — 500855 HCHG NEEDLE, IRRIG CYSTOTOME 27G: Performed by: OPHTHALMOLOGY

## 2017-08-14 DEVICE — IMPLANTABLE DEVICE: Type: IMPLANTABLE DEVICE | Status: FUNCTIONAL

## 2017-08-14 RX ORDER — PROPARACAINE HYDROCHLORIDE 5 MG/ML
SOLUTION/ DROPS OPHTHALMIC
Status: DISCONTINUED
Start: 2017-08-14 | End: 2017-08-14 | Stop reason: HOSPADM

## 2017-08-14 RX ORDER — TROPICAMIDE 10 MG/ML
SOLUTION/ DROPS OPHTHALMIC
Status: COMPLETED
Start: 2017-08-14 | End: 2017-08-14

## 2017-08-14 RX ORDER — PHENYLEPHRINE HYDROCHLORIDE 25 MG/ML
SOLUTION/ DROPS OPHTHALMIC
Status: COMPLETED
Start: 2017-08-14 | End: 2017-08-14

## 2017-08-14 RX ORDER — FLURBIPROFEN SODIUM 0.3 MG/ML
SOLUTION/ DROPS OPHTHALMIC
Status: COMPLETED
Start: 2017-08-14 | End: 2017-08-14

## 2017-08-14 RX ORDER — SODIUM CHLORIDE, SODIUM LACTATE, POTASSIUM CHLORIDE, CALCIUM CHLORIDE 600; 310; 30; 20 MG/100ML; MG/100ML; MG/100ML; MG/100ML
1000 INJECTION, SOLUTION INTRAVENOUS
Status: COMPLETED | OUTPATIENT
Start: 2017-08-14 | End: 2017-08-14

## 2017-08-14 RX ADMIN — PHENYLEPHRINE HYDROCHLORIDE 1 DROP: 2.5 SOLUTION/ DROPS OPHTHALMIC at 07:00

## 2017-08-14 RX ADMIN — FLURBIPROFEN SODIUM 1 DROP: 0.3 SOLUTION/ DROPS OPHTHALMIC at 07:00

## 2017-08-14 RX ADMIN — SODIUM CHLORIDE, SODIUM LACTATE, POTASSIUM CHLORIDE, CALCIUM CHLORIDE 1000 ML: 600; 310; 30; 20 INJECTION, SOLUTION INTRAVENOUS at 07:00

## 2017-08-14 RX ADMIN — TROPICAMIDE 1 DROP: 10 SOLUTION/ DROPS OPHTHALMIC at 07:00

## 2017-08-14 ASSESSMENT — PAIN SCALES - GENERAL: PAINLEVEL_OUTOF10: 0

## 2017-08-14 NOTE — OP REPORT
DATE OF SERVICE:  08/14/2017    PROCEDURE:  Phaco IOL, left eye.    PREOPERATIVE DIAGNOSIS:  Cataract, left eye.    POSTOPERATIVE DIAGNOSIS:  Cataract, left eye.    SURGEON:  Dwayne Ching MD    ASSISTANT:  None.    COMPLICATIONS:  None.    ESTIMATED BLOOD LOSS:  None.    ANESTHESIA:  Topical with MAC per Dr. Carr.     PROCEDURE IN DETAIL:  After appropriate consents were obtained, the patient   was brought to the operating room and then prepared and draped in the usual   sterile fashion for ophthalmology.  Lid speculum was placed in the left eye   after which a supersharp was used to make a stab incision at the 4 o'clock   position through which 2% preservative-free Xylocaine and Viscoat was injected   followed by a 2.75 mm stab incision at the 2 o'clock position through which   cystotome and Utrata made a 360 degree capsulorrhexis followed by   hydrodissection and spinning of the nucleus with BSS and a blunt cannula.   Phaco removed the lens.  I and A of the cortex. An SN60WF 19.5 was placed into   the posterior capsule with Provisc.  All the viscoelastics were removed with   I and A.  Lens was centered.  Lid speculum was removed under the microscope   after the wounds had been hydrated with BSS on a blunt cannula noted to be   free of leak and a lid speculum in the anterior chamber remained soft and well   formed.     LENS TYPE:  ZCBOO 22.0       ____________________________________     Dwayne Ching MD    [facility] / [facility]    DD:  08/14/2017 10:29:22  DT:  08/14/2017 10:43:42    D#:  [id number]  Job#:  [id number]

## 2017-08-14 NOTE — IP AVS SNAPSHOT
" Home Care Instructions                                                                                                                Name:Karsten Jaime  Medical Record Number:[id number]  CSN: [id number]    YOB: 1955   Age: 62 y.o.  Sex: male  HT:1.753 m (5' 9\") WT: 87.2 kg (192 lb 3.9 oz)          Admit Date: 8/14/2017     Discharge Date:   Today's Date: 8/14/2017  Attending Doctor:  Dwayne Ching M.D.                  Allergies:  Padmini-seltzer and Aspirin                Discharge Instructions       HOME CARE INSTRUCTIONS FOR CATARACT SURGERY    ACTIVITY: Rest and take it easy for the first 24 hours. We strongly suggest that a responsible adult remain with you during that time. It is normal to feel sleepy. We encourage you to not do anything that requires balance, judgment or coordination. Be extra careful when walking (with a dilated eye, it is easier to trip and fall).     FOR 24 HOURS, DO NOT:       Drive, operate machinery or run household appliances.        Drink beer or alcoholic beverages.        Make important decisions or sign legal documents.     DIET: To avoid nausea, slowly advance diet as tolerated, avoiding spicy or greasy foods for the first meal.     MEDICATIONS: Resume taking daily medication. You may take Tylenol for mild discomfort, if needed.     SURGICAL DRESSING: Eye shield as instructed by your doctor. Dark glasses should be worn while in the sunlight.     Follow your Physician's instruction Sheet. Eye Kit Given    A follow-up appointment is scheduled with your doctor tomorrow at 1:00 pm.     You should call 911 if you develop problems with breathing or chest pain.  You should CALL YOUR PHYSICIAN if you develop: Sharp stabbing pain or sudden change in vision in your operative eye. If you are unable to contact your doctor or the surgical center, you should go to the nearest emergency room or urgent care center.   Physician's telephone # [phone]    If any questions arise, call " your doctor. If your doctor is not available, please feel free to call Same Day Surgery at [phone]. You can also call the Health Hotline open 24 hours/day, 7 days/week and speak to a nurse at [phone] or [phone].     I acknowledge receipt and understanding of these Home Care Instructions.    ______________________________     _______________________________            Signature of Patient / Responsible Adult                                                       RN Signature    A registered nurse may call you a few days after your surgery to see how you are doing.   You may also receive a survey in the mail within the next two weeks and we ask that you take a few moments to complete and return the survey. Our goal is to provide you with very good care and we value your comments. Thank you for choosing [facility].        Medication List      ASK your doctor about these medications        Instructions    Morning Afternoon Evening Bedtime    ALLEGRA ALLERGY 180 MG tablet   Generic drug:  fexofenadine        Take 180 mg by mouth every day.   Dose:  180 mg                        Ciclopirox 0.77 % Gel        Apply once a day for great toes and fifth toes nails on both feet.                        mometasone 50 MCG/ACT nasal spray   Commonly known as:  NASONEX        Spray 2 Sprays in nose every day.   Dose:  2 Spray                        pantoprazole 40 MG Tbec   Commonly known as:  PROTONIX        Take 40 mg by mouth every day.   Dose:  40 mg                        simvastatin 20 MG Tabs   Commonly known as:  ZOCOR        TAKE 1 TAB BY MOUTH EVERY EVENING.                        triamterene/hctz 37.5-25 MG Caps   Commonly known as:  MAXZIDE-25/DYAZIDE        TAKE 1 CAPSULE BY MOUTH EVERY DAY.                        valsartan 160 MG Tabs   Commonly known as:  DIOVAN        TAKE 1 TABLET BY MOUTH EVERY DAY.                                Medication Information     Above and/or attached are  the medications your physician expects you to take upon discharge. Review all of your home medications and newly ordered medications with your doctor and/or pharmacist. Follow medication instructions as directed by your doctor and/or pharmacist. Please keep your medication list with you and share with your physician. Update the information when medications are discontinued, doses are changed, or new medications (including over-the-counter products) are added; and carry medication information at all times in the event of emergency situations.        Resources     Quit Smoking / Tobacco Use:    I understand the use of any tobacco products increases my chance of suffering from future heart disease or stroke and could cause other illnesses which may shorten my life. Quitting the use of tobacco products is the single most important thing I can do to improve my health. For further information on smoking / tobacco cessation call a Toll Free Quit Line at 1-807.200.2142 (*[employer]) or 1-565.813.3770 (American [employer]) or you can access the web based program at www.lungFinexkap.org.    [place] Tobacco Users Help Line:  (694) 143-2589       Toll Free: 1-916.743.5844  Quit Tobacco Program [facility] [phone]    Crisis Hotline:    [place] Crisis Hotline:  9-134-TPBPXUV or 1-101.723.7819    [place] Crisis Hotline:    1-379.949.7491 or 900-694-4712    Discharge Survey:   Thank you for choosing [facility]. We hope we did everything we could to make your hospital stay a pleasant one. You may be receiving a survey and we would appreciate your time and participation in answering the questions. Your input is very valuable to us in our efforts to improve our service to our patients and their families.            Signatures     My signature on this form indicates that:    1. I acknowledge receipt and understanding of these Home Care Instruction.  2. My questions regarding this  information have been answered to my satisfaction.  3. I have formulated a plan with my discharge nurse to obtain my prescribed medications for home.    __________________________________      __________________________________                   Patient Signature                                 Guardian/Responsible Adult Signature      __________________________________                 __________       ________                       Nurse Signature                                               Date                 Time

## 2017-08-14 NOTE — IP AVS SNAPSHOT
8/14/2017    Karsten Jaime  [address] [place] [address]    Dear Karsten:    [facility] wants to ensure your discharge home is safe and you or your loved ones have had all of your questions answered regarding your care after you leave the hospital.    Below is a list of resources and contact information should you have any questions regarding your hospital stay, follow-up instructions, or active medical symptoms.    Questions or Concerns Regarding… Contact   Medical Questions Related to Your Discharge  (7 days a week, 8am-5pm) Contact a Nurse Care Coordinator   [phone]   Medical Questions Not Related to Your Discharge  (24 hours a day / 7 days a week)  Contact the Nurse Health Line   [phone]    Medications or Discharge Instructions Refer to your discharge packet   or contact your [facility] Primary Care Provider   [phone]   Follow-up Appointment(s) Schedule your appointment via Mind[employer]   or contact Scheduling [phone]   Billing Review your statement via Mindjet  or contact Billing [phone]   Medical Records Review your records via Mindjet   or contact Medical Records [phone]     You may receive a telephone call within two days of discharge. This call is to make certain you understand your discharge instructions and have the opportunity to have any questions answered. You can also easily access your medical information, test results and upcoming appointments via the Mind[employer] free online health management tool. You can learn more and sign up at [employer]/Mindjet. For assistance setting up your Mindjet account, please call [phone].    Once again, we want to ensure your discharge home is safe and that you have a clear understanding of any next steps in your care. If you have any questions or concerns, please do not hesitate to contact us, we are here for you. Thank you for choosing [facility] for your healthcare needs.    Sincerely,    Your [facility] Healthcare Team

## 2017-08-14 NOTE — DISCHARGE INSTRUCTIONS
HOME CARE INSTRUCTIONS FOR CATARACT SURGERY    ACTIVITY: Rest and take it easy for the first 24 hours. We strongly suggest that a responsible adult remain with you during that time. It is normal to feel sleepy. We encourage you to not do anything that requires balance, judgment or coordination. Be extra careful when walking (with a dilated eye, it is easier to trip and fall).     FOR 24 HOURS, DO NOT:       Drive, operate machinery or run household appliances.        Drink beer or alcoholic beverages.        Make important decisions or sign legal documents.     DIET: To avoid nausea, slowly advance diet as tolerated, avoiding spicy or greasy foods for the first meal.     MEDICATIONS: Resume taking daily medication. You may take Tylenol for mild discomfort, if needed.     SURGICAL DRESSING: Eye shield as instructed by your doctor. Dark glasses should be worn while in the sunlight.     Follow your Physician's instruction Sheet. Eye Kit Given    A follow-up appointment is scheduled with your doctor tomorrow at 1:00 pm.     You should call 911 if you develop problems with breathing or chest pain.  You should CALL YOUR PHYSICIAN if you develop: Sharp stabbing pain or sudden change in vision in your operative eye. If you are unable to contact your doctor or the surgical center, you should go to the nearest emergency room or urgent care center.   Physician's telephone # [phone]    If any questions arise, call your doctor. If your doctor is not available, please feel free to call Same Day Surgery at [phone]. You can also call the [facility] Hotline open 24 hours/day, 7 days/week and speak to a nurse at [phone] or [phone].     I acknowledge receipt and understanding of these Home Care Instructions.    ______________________________     _______________________________            Signature of Patient / Responsible Adult                                                       RN Signature    A registered nurse may  call you a few days after your surgery to see how you are doing.   You may also receive a survey in the mail within the next two weeks and we ask that you take a few moments to complete and return the survey. Our goal is to provide you with very good care and we value your comments. Thank you for choosing [facility].

## 2017-08-14 NOTE — OR NURSING
Received from OR.  Operative eye dilated.  VSS  Patient without complaint.  Patient given juice.  Family at side.  Discharge instructions to patient and family

## 2017-09-22 NOTE — OP REPORT
PROCEDURE: PHACO IOL OS  PRE OP DIAGNOSIS: NUCLEAR CATARACT LEFT EYE  POST OP DIAGNOSIS: SAME  SURGEON: HANNA HUIZAR MD  ASSISTANT: NONE  COMPLICATIONS: NONE  ESTIMATED BLOOD LOSS: NONE  ANESTHESIA: TOPICAL WITH MAC  PROCEDURE IN DETAIL: AFTER APPROPRIATE CONSENTS, PATIENT WAS BROUGHT TO THE OPERATING ROOM AND THEN PREPARED AND DRAPED IN THE USUAL STERILE FASHION. A LID SPECULUM PLACED, SUPER SHARP 0.7MM INCISION MADE AT 4 OCLOCK [place], THROUGH WHICH 1% PF LIDOCAINE AND VISCOAT WERE GIVEN. 2.75 MM KERATOME MADE SIMILAR INCISION AT 2 OCLOCK, THROUGH WHICH CYSTOTOME WAS USED TO MAKE A 360 DEGREE 6MM CAPSULAR OPENING FOLLOWED BY HYDRODISSECTION OF THE NUCLEUS WITH SPINNING AND REMOVAL OF THE LENS BY TRISECTION AND PHACOEMULSIFICATION FOLLOWED BY I+A. ZCBOO 22.0 IOL WAS PLACED ALL WOUNDS HYDRATED AND NOTED TO BE FREE OF LEAK, LID SPECULUM REMOVED AND PATIENT WAS FOLLOWED TO POST OP WHERE HE WAS NOTED TO BE IN GOOD CONDITION.

## 2017-10-24 ENCOUNTER — TELEPHONE (OUTPATIENT)
Dept: MEDICAL GROUP | Age: 62
End: 2017-10-24

## 2017-10-24 NOTE — TELEPHONE ENCOUNTER
ESTABLISHED PATIENT PRE-VISIT PLANNING     Note: Patient will not be contacted if there is no indication to call.     1.  Reviewed notes from the last few office visits within the medical group: Yes    2.  If any orders were placed at last visit or intended to be done for this visit (i.e. 6 mos follow-up), do we have Results/Consult Notes?        •  Labs - Labs ordered, but not to be completed until 11/15.   Note: If patient appointment is for lab review and patient did not complete labs,                check with provider if OK to reschedule patient until labs completed.       •  Imaging - Imaging was not ordered at last office visit.       •  Referrals - No referrals were ordered at last office visit.    3. Is this appointment scheduled as a Hospital Follow-Up? No    4.  Immunizations were updated in Epic using WebIZ?: Epic matches WebIZ       •  Web Iz Recommendations: FLU, MMR  and TD    5.  Patient is due for the following Health Maintenance Topics:   Health Maintenance Due   Topic Date Due   • IMM INFLUENZA (1) 09/01/2017   • COLONOSCOPY  11/10/2017           6.  Patient was NOT informed to arrive 15 min prior to their scheduled appointment and bring in their medication bottles.

## 2017-10-25 ENCOUNTER — OFFICE VISIT (OUTPATIENT)
Dept: MEDICAL GROUP | Age: 62
End: 2017-10-25
Payer: COMMERCIAL

## 2017-10-25 VITALS
RESPIRATION RATE: 18 BRPM | HEART RATE: 74 BPM | HEIGHT: 69 IN | WEIGHT: 190.4 LBS | SYSTOLIC BLOOD PRESSURE: 116 MMHG | DIASTOLIC BLOOD PRESSURE: 86 MMHG | TEMPERATURE: 98.4 F | OXYGEN SATURATION: 96 % | BODY MASS INDEX: 28.2 KG/M2

## 2017-10-25 DIAGNOSIS — R42 POSTURAL DIZZINESS WITH PRESYNCOPE: ICD-10-CM

## 2017-10-25 DIAGNOSIS — E78.00 PURE HYPERCHOLESTEROLEMIA: ICD-10-CM

## 2017-10-25 DIAGNOSIS — I10 ESSENTIAL HYPERTENSION: ICD-10-CM

## 2017-10-25 DIAGNOSIS — R55 POSTURAL DIZZINESS WITH PRESYNCOPE: ICD-10-CM

## 2017-10-25 PROBLEM — I95.1 POSTURAL HYPOTENSION: Status: ACTIVE | Noted: 2017-10-25

## 2017-10-25 PROCEDURE — 99214 OFFICE O/P EST MOD 30 MIN: CPT | Performed by: INTERNAL MEDICINE

## 2017-10-25 NOTE — PROGRESS NOTES
Subjective:   Karsten Jaime is a 62 y.o. male here today for evaluation and management of:      Postural dizziness with presyncope  This is a new problem to me. Patient reported that he has intermittent dizziness during postural change from lying to standing over a year. He had severe dizziness and almost fainting yesterday after he was treated with rolling massage on the rolling bed at chiropractor office. He caught himself before he fell. He denied head injury. Symptoms last for a few seconds. He also had similar experience after chiropractor treatment a few months ago. He also noticed dizziness when he gets up from lying down a few months ago. He denied chest pain, palpitation or skipped beat or confusion, slurred speech, diplopia, weakness on one side of the body during or after dizzy spell. He denies history of loss of consciousness. He denied history of head injury.    Essential hypertension  Patient is taking same antihypertensive medication for many years. He is taking Diovan 160 mg daily and triamterene-hydrochlorothiazide 37.5-25 mg daily as instructed. His blood pressure has been stable with current regimens. He denies side effects from taking current regimens.    Pure hypercholesterolemia  Patient is taking simvastatin 20 mg every evening. His LDL at goal but triglycerides still high. We discussed to take omega-3. However, he has not started taking it yet. He will have omega-3 supplements and take it daily.         Current medicines (including changes today)  Current Outpatient Prescriptions   Medication Sig Dispense Refill   • valsartan (DIOVAN) 160 MG Tab TAKE 1 TABLET BY MOUTH EVERY DAY. 30 Tab 12   • triamterene/hctz (MAXZIDE-25/DYAZIDE) 37.5-25 MG Cap TAKE 1 CAPSULE BY MOUTH EVERY DAY. 30 Cap 11   • simvastatin (ZOCOR) 20 MG Tab TAKE 1 TAB BY MOUTH EVERY EVENING. 30 Tab 11   • mometasone (NASONEX) 50 MCG/ACT nasal spray Spray 2 Sprays in nose every day. 1 Inhaler 3   • fexofenadine (ALLEGRA  "ALLERGY) 180 MG tablet Take 180 mg by mouth every day.     • Ciclopirox 0.77 % Gel Apply once a day for great toes and fifth toes nails on both feet. 1 Tube 3   • pantoprazole (PROTONIX) 40 MG TBEC Take 40 mg by mouth every day.       No current facility-administered medications for this visit.      He  has a past medical history of GERD (gastroesophageal reflux disease); Hyperlipidemia; and Hypertension.    ROS   No chest pain, no shortness of breath, no abdominal pain       Objective:     Blood pressure 116/86, pulse 74, temperature 36.9 °C (98.4 °F), resp. rate 18, height 1.753 m (5' 9.02\"), weight 86.4 kg (190 lb 6.4 oz), SpO2 96 %. Body mass index is 28.1 kg/m².   Physical Exam:  General: Alert, oriented and no acute distress.  Eye contact is good, speech goal directed, affect calm  HEENT: conjunctiva non-injected, sclera non-icteric.  Oral mucous membranes pink and moist with no lesions.  Pinna normal.   Lungs: Normal respiratory effort, clear to auscultation bilaterally with good excursion.  CV: regular rate and rhythm. No murmurs. No carotid bruits.  Abdomen: soft, non distended, nontender, Bowel sound normal.  Ext: no edema, color normal, vascularity normal, temperature normal  Neurologic exam: No pronator drift. No facial droop, muscle strength 5 out of 5 throughout on both upper and lower extremities, normal gait, cranial nerves grossly normal.      Assessment and Plan:   The following treatment plan was discussed     1. Postural dizziness with presyncope  - Discussed possible causes of patient including but not limited to postural hypotension, Carotid stenosis, posterior circulation problem.  - No neurological deficit on exam  - Advised to get up slowly from sitting or lying down. Advised to check blood pressure regularly. Advised to drink enough water for hydration.  - Patient does not want to do CT angiogram head and neck at this moment.   - Ordered carotid ultrasound and echocardiogram for further " evaluation.  - US-CAROTID DOPPLER; Future  - ECHOCARDIOGRAM COMP W/O CONT; Future    2. Essential hypertension  - Stable. Continue current regimens. Advised to check blood pressure and pulse regularly at home. Advised to bring the readings to next visit.  - US-CAROTID DOPPLER; Future  - ECHOCARDIOGRAM COMP W/O CONT; Future    3. Pure hypercholesterolemia  - LDL at goal. Triglycerides remains high. Advised to continue simvastatin 20 mg every afternoon. Advised to take omega-3 1000 mg twice a day with meal.  - Advised to eat low fat, low carbohydrate and high fiber diet as well as do cardio physical exercise regularly.   - US-CAROTID DOPPLER; Future  - ECHOCARDIOGRAM COMP W/O CONT; Future    4. Health Maintenance   - Patient received flu vaccine on 9/19/17.     Followup: Return in about 3 months (around 1/25/2018), or if symptoms worsen or fail to improve, for hypertension, hyperlipidemia, postural dizziness, GERD, lab review.      Please note that this dictation was created using voice recognition software. I have made every reasonable attempt to correct obvious errors, but I expect that there may have unintended errors in text, spelling, punctuation, or grammar that I did not discover.

## 2017-10-25 NOTE — ASSESSMENT & PLAN NOTE
This is a new problem to me. Patient reported that he has intermittent dizziness during postural change from lying to standing over a year. He had severe dizziness and almost fainting yesterday after he was treated with rolling massage on the rolling bed at chiropractor office. He caught himself before he fell. He denied head injury. Symptoms last for a few seconds. He also had similar experience after chiropractor treatment a few months ago. He also noticed dizziness when he gets up from lying down a few months ago. He denied chest pain, palpitation or skipped beat or confusion, slurred speech, diplopia, weakness on one side of the body during or after dizzy spell. He denies history of loss of consciousness. He denied history of head injury.

## 2017-10-25 NOTE — ASSESSMENT & PLAN NOTE
Patient is taking same antihypertensive medication for many years. He is taking Diovan 160 mg daily and triamterene-hydrochlorothiazide 37.5-25 mg daily as instructed. His blood pressure has been stable with current regimens. He denies side effects from taking current regimens.

## 2017-10-25 NOTE — ASSESSMENT & PLAN NOTE
Patient is taking simvastatin 20 mg every evening. His LDL at goal but triglycerides still high. We discussed to take omega-3. However, he has not started taking it yet. He will have omega-3 supplements and take it daily.

## 2017-11-06 ENCOUNTER — HOSPITAL ENCOUNTER (OUTPATIENT)
Dept: LAB | Facility: MEDICAL CENTER | Age: 62
End: 2017-11-06
Attending: INTERNAL MEDICINE
Payer: COMMERCIAL

## 2017-11-06 DIAGNOSIS — R73.01 IFG (IMPAIRED FASTING GLUCOSE): ICD-10-CM

## 2017-11-06 DIAGNOSIS — K21.9 GASTROESOPHAGEAL REFLUX DISEASE WITHOUT ESOPHAGITIS: ICD-10-CM

## 2017-11-06 DIAGNOSIS — E78.00 PURE HYPERCHOLESTEROLEMIA: ICD-10-CM

## 2017-11-06 DIAGNOSIS — I10 ESSENTIAL HYPERTENSION: ICD-10-CM

## 2017-11-06 LAB
ALBUMIN SERPL BCP-MCNC: 3.7 G/DL (ref 3.2–4.9)
ALBUMIN/GLOB SERPL: 1.2 G/DL
ALP SERPL-CCNC: 92 U/L (ref 30–99)
ALT SERPL-CCNC: 37 U/L (ref 2–50)
ANION GAP SERPL CALC-SCNC: 6 MMOL/L (ref 0–11.9)
AST SERPL-CCNC: 23 U/L (ref 12–45)
BASOPHILS # BLD AUTO: 0.5 % (ref 0–1.8)
BASOPHILS # BLD: 0.03 K/UL (ref 0–0.12)
BILIRUB SERPL-MCNC: 0.7 MG/DL (ref 0.1–1.5)
BUN SERPL-MCNC: 18 MG/DL (ref 8–22)
CALCIUM SERPL-MCNC: 9.1 MG/DL (ref 8.5–10.5)
CHLORIDE SERPL-SCNC: 107 MMOL/L (ref 96–112)
CHOLEST SERPL-MCNC: 117 MG/DL (ref 100–199)
CO2 SERPL-SCNC: 27 MMOL/L (ref 20–33)
CREAT SERPL-MCNC: 1.11 MG/DL (ref 0.5–1.4)
EOSINOPHIL # BLD AUTO: 0.24 K/UL (ref 0–0.51)
EOSINOPHIL NFR BLD: 4.1 % (ref 0–6.9)
ERYTHROCYTE [DISTWIDTH] IN BLOOD BY AUTOMATED COUNT: 43.6 FL (ref 35.9–50)
EST. AVERAGE GLUCOSE BLD GHB EST-MCNC: 128 MG/DL
GFR SERPL CREATININE-BSD FRML MDRD: >60 ML/MIN/1.73 M 2
GLOBULIN SER CALC-MCNC: 3.1 G/DL (ref 1.9–3.5)
GLUCOSE SERPL-MCNC: 113 MG/DL (ref 65–99)
HBA1C MFR BLD: 6.1 % (ref 0–5.6)
HCT VFR BLD AUTO: 49.3 % (ref 42–52)
HDLC SERPL-MCNC: 39 MG/DL
HGB BLD-MCNC: 17.1 G/DL (ref 14–18)
IMM GRANULOCYTES # BLD AUTO: 0.01 K/UL (ref 0–0.11)
IMM GRANULOCYTES NFR BLD AUTO: 0.2 % (ref 0–0.9)
LDLC SERPL CALC-MCNC: 54 MG/DL
LYMPHOCYTES # BLD AUTO: 2.16 K/UL (ref 1–4.8)
LYMPHOCYTES NFR BLD: 37 % (ref 22–41)
MCH RBC QN AUTO: 31 PG (ref 27–33)
MCHC RBC AUTO-ENTMCNC: 34.7 G/DL (ref 33.7–35.3)
MCV RBC AUTO: 89.5 FL (ref 81.4–97.8)
MONOCYTES # BLD AUTO: 0.46 K/UL (ref 0–0.85)
MONOCYTES NFR BLD AUTO: 7.9 % (ref 0–13.4)
NEUTROPHILS # BLD AUTO: 2.94 K/UL (ref 1.82–7.42)
NEUTROPHILS NFR BLD: 50.3 % (ref 44–72)
NRBC # BLD AUTO: 0 K/UL
NRBC BLD AUTO-RTO: 0 /100 WBC
PLATELET # BLD AUTO: 204 K/UL (ref 164–446)
PMV BLD AUTO: 11.2 FL (ref 9–12.9)
POTASSIUM SERPL-SCNC: 3.4 MMOL/L (ref 3.6–5.5)
PROT SERPL-MCNC: 6.8 G/DL (ref 6–8.2)
RBC # BLD AUTO: 5.51 M/UL (ref 4.7–6.1)
SODIUM SERPL-SCNC: 140 MMOL/L (ref 135–145)
TRIGL SERPL-MCNC: 121 MG/DL (ref 0–149)
WBC # BLD AUTO: 5.8 K/UL (ref 4.8–10.8)

## 2017-11-06 PROCEDURE — 83036 HEMOGLOBIN GLYCOSYLATED A1C: CPT

## 2017-11-06 PROCEDURE — 80053 COMPREHEN METABOLIC PANEL: CPT

## 2017-11-06 PROCEDURE — 80061 LIPID PANEL: CPT

## 2017-11-06 PROCEDURE — 85025 COMPLETE CBC W/AUTO DIFF WBC: CPT

## 2017-11-06 PROCEDURE — 36415 COLL VENOUS BLD VENIPUNCTURE: CPT

## 2017-11-09 ENCOUNTER — HOSPITAL ENCOUNTER (OUTPATIENT)
Dept: RADIOLOGY | Facility: MEDICAL CENTER | Age: 62
End: 2017-11-09
Attending: INTERNAL MEDICINE
Payer: COMMERCIAL

## 2017-11-09 ENCOUNTER — HOSPITAL ENCOUNTER (OUTPATIENT)
Dept: CARDIOLOGY | Facility: MEDICAL CENTER | Age: 62
End: 2017-11-09
Attending: INTERNAL MEDICINE
Payer: COMMERCIAL

## 2017-11-09 DIAGNOSIS — E78.00 PURE HYPERCHOLESTEROLEMIA: ICD-10-CM

## 2017-11-09 DIAGNOSIS — R42 POSTURAL DIZZINESS WITH PRESYNCOPE: ICD-10-CM

## 2017-11-09 DIAGNOSIS — I10 ESSENTIAL HYPERTENSION: ICD-10-CM

## 2017-11-09 DIAGNOSIS — R55 POSTURAL DIZZINESS WITH PRESYNCOPE: ICD-10-CM

## 2017-11-09 LAB
LV EJECT FRACT  99904: 65
LV EJECT FRACT MOD 2C 99903: 74.34
LV EJECT FRACT MOD 4C 99902: 63.52
LV EJECT FRACT MOD BP 99901: 66.9

## 2017-11-09 PROCEDURE — 93880 EXTRACRANIAL BILAT STUDY: CPT

## 2017-11-09 PROCEDURE — 93306 TTE W/DOPPLER COMPLETE: CPT

## 2017-11-14 ENCOUNTER — TELEPHONE (OUTPATIENT)
Dept: MEDICAL GROUP | Age: 62
End: 2017-11-14

## 2017-11-14 NOTE — TELEPHONE ENCOUNTER
ESTABLISHED PATIENT PRE-VISIT PLANNING     Note: Patient will not be contacted if there is no indication to call.     1.  Reviewed notes from the last few office visits within the medical group: Yes    2.  If any orders were placed at last visit or intended to be done for this visit (i.e. 6 mos follow-up), do we have Results/Consult Notes?        •  Labs - Labs ordered, completed on 11/6 and results are in chart.   Note: If patient appointment is for lab review and patient did not complete labs, check with provider if OK to reschedule patient until labs completed.       •  Imaging - Imaging ordered, completed and results are in chart.       •  Referrals - No referrals were ordered at last office visit.    3. Is this appointment scheduled as a Hospital Follow-Up? No    4.  Immunizations were updated in Epic using WebIZ?: Epic matches WebIZ       •  Web Iz Recommendations: MMR  and TD    5.  Patient is due for the following Health Maintenance Topics:   Health Maintenance Due   Topic Date Due   • COLONOSCOPY  11/10/2017           6.  Patient was NOT informed to arrive 15 min prior to their scheduled appointment and bring in their medication bottles.

## 2017-11-14 NOTE — ASSESSMENT & PLAN NOTE
He is taking valsartan 160 mg daily and triamterene-hydrochlorothiazide 37.5-25 mg daily. He denies side effects from taking his current blood pressure medications. His kidney functions are stable. He has slightly low potassium at 3.4. We discussed to eat high potassium diet and over-the-counter potassium supplement.

## 2017-11-14 NOTE — ASSESSMENT & PLAN NOTE
Patient has prediabetes with fasting blood sugar 113 and A1c 6.1 on 11/6/17. Patient is not able to control his carbohydrate intake. He likes to eat a lot of starchy diet.

## 2017-11-14 NOTE — ASSESSMENT & PLAN NOTE
Patient's cholesterol level is well controlled with simvastatin 20 mg every evening except low HDL cholesterol at 39. He denies side effects from taking simvastatin. His liver enzymes are within normal.    Results for PURVI GRACE (MRN [id number]) as of 11/14/2017 14:53   Ref. Range 4/26/2017 09:23 11/6/2017 08:15   Cholesterol,Tot Latest Ref Range: 100 - 199 mg/dL 138 117   Triglycerides Latest Ref Range: 0 - 149 mg/dL 176 (H) 121   HDL Latest Ref Range: >=40 mg/dL 38 (A) 39 (A)   LDL Latest Ref Range: <100 mg/dL 65 54

## 2017-11-14 NOTE — ASSESSMENT & PLAN NOTE
Patient reported intermittent dizziness during postural change from lying to standing in previous visit on 10/25/17. He had intermittent symptoms for over a year. We did carotid ultrasound and echocardiogram on 11/9/17. His carotid ultrasound was normal and echocardiogram was generally normal with normal ejection fraction and no significant valvular disease. Patient denies other neurological symptoms such as headache, blurred vision or weakness on one side of the body or sensory change on his body or face.  Patient stated that he did not have similar symptoms such as dizzy or presyncopal anymore.

## 2017-11-15 ENCOUNTER — OFFICE VISIT (OUTPATIENT)
Dept: MEDICAL GROUP | Age: 62
End: 2017-11-15
Payer: COMMERCIAL

## 2017-11-15 VITALS
DIASTOLIC BLOOD PRESSURE: 80 MMHG | OXYGEN SATURATION: 96 % | BODY MASS INDEX: 27.7 KG/M2 | HEIGHT: 69 IN | TEMPERATURE: 97.9 F | WEIGHT: 187 LBS | HEART RATE: 66 BPM | SYSTOLIC BLOOD PRESSURE: 136 MMHG

## 2017-11-15 DIAGNOSIS — I10 ESSENTIAL HYPERTENSION: ICD-10-CM

## 2017-11-15 DIAGNOSIS — R55 POSTURAL DIZZINESS WITH PRESYNCOPE: ICD-10-CM

## 2017-11-15 DIAGNOSIS — R42 POSTURAL DIZZINESS WITH PRESYNCOPE: ICD-10-CM

## 2017-11-15 DIAGNOSIS — R73.01 IFG (IMPAIRED FASTING GLUCOSE): ICD-10-CM

## 2017-11-15 DIAGNOSIS — B35.1 ONYCHOMYCOSIS: ICD-10-CM

## 2017-11-15 DIAGNOSIS — E78.00 PURE HYPERCHOLESTEROLEMIA: ICD-10-CM

## 2017-11-15 PROCEDURE — 99214 OFFICE O/P EST MOD 30 MIN: CPT | Performed by: INTERNAL MEDICINE

## 2017-11-15 RX ORDER — CICLOPIROX 7.7 MG/G
GEL TOPICAL
Qty: 1 TUBE | Refills: 3 | Status: SHIPPED | OUTPATIENT
Start: 2017-11-15 | End: 2018-12-23

## 2017-11-15 ASSESSMENT — PATIENT HEALTH QUESTIONNAIRE - PHQ9: CLINICAL INTERPRETATION OF PHQ2 SCORE: 0

## 2017-11-15 NOTE — PROGRESS NOTES
Subjective:   Purvi Jaime is a 62 y.o. male here today for evaluation and management of:      Pure hypercholesterolemia  Patient's cholesterol level is well controlled with simvastatin 20 mg every evening except low HDL cholesterol at 39. He denies side effects from taking simvastatin. His liver enzymes are within normal.    Results for PURVI JAIME (MRN [id number]) as of 11/14/2017 14:53   Ref. Range 4/26/2017 09:23 11/6/2017 08:15   Cholesterol,Tot Latest Ref Range: 100 - 199 mg/dL 138 117   Triglycerides Latest Ref Range: 0 - 149 mg/dL 176 (H) 121   HDL Latest Ref Range: >=40 mg/dL 38 (A) 39 (A)   LDL Latest Ref Range: <100 mg/dL 65 54       Postural dizziness with presyncope  Patient reported intermittent dizziness during postural change from lying to standing in previous visit on 10/25/17. He had intermittent symptoms for over a year. We did carotid ultrasound and echocardiogram on 11/9/17. His carotid ultrasound was normal and echocardiogram was generally normal with normal ejection fraction and no significant valvular disease. Patient denies other neurological symptoms such as headache, blurred vision or weakness on one side of the body or sensory change on his body or face.  Patient stated that he did not have similar symptoms such as dizzy or presyncopal anymore.    Essential hypertension  He is taking valsartan 160 mg daily and triamterene-hydrochlorothiazide 37.5-25 mg daily. He denies side effects from taking his current blood pressure medications. His kidney functions are stable. He has slightly low potassium at 3.4. We discussed to eat high potassium diet and over-the-counter potassium supplement.    IFG (impaired fasting glucose)  Patient has prediabetes with fasting blood sugar 113 and A1c 6.1 on 11/6/17. Patient is not able to control his carbohydrate intake. He likes to eat a lot of starchy diet.    Onychomycosis  Patient is using ciclopirox gel every day on both great toenails. He  "feels improvement with medication. He requested to refill the gel today.         Current medicines (including changes today)  Current Outpatient Prescriptions   Medication Sig Dispense Refill   • Ciclopirox 0.77 % Gel Apply once a day for great toes and fifth toes nails on both feet. 1 Tube 3   • valsartan (DIOVAN) 160 MG Tab TAKE 1 TABLET BY MOUTH EVERY DAY. 30 Tab 12   • triamterene/hctz (MAXZIDE-25/DYAZIDE) 37.5-25 MG Cap TAKE 1 CAPSULE BY MOUTH EVERY DAY. 30 Cap 11   • simvastatin (ZOCOR) 20 MG Tab TAKE 1 TAB BY MOUTH EVERY EVENING. 30 Tab 11   • mometasone (NASONEX) 50 MCG/ACT nasal spray Spray 2 Sprays in nose every day. 1 Inhaler 3   • fexofenadine (ALLEGRA ALLERGY) 180 MG tablet Take 180 mg by mouth every day.     • pantoprazole (PROTONIX) 40 MG TBEC Take 40 mg by mouth every day.       No current facility-administered medications for this visit.      He  has a past medical history of GERD (gastroesophageal reflux disease); Hyperlipidemia; and Hypertension.    ROS   No chest pain, no shortness of breath, no abdominal pain       Objective:     Blood pressure 136/80, pulse 66, temperature 36.6 °C (97.9 °F), height 1.753 m (5' 9\"), weight 84.8 kg (187 lb), SpO2 96 %. Body mass index is 27.62 kg/m².   Physical Exam:  General: Alert, oriented and no acute distress.  Eye contact is good, speech goal directed, affect calm  HEENT: conjunctiva non-injected, sclera non-icteric.  Oral mucous membranes pink and moist with no lesions.  Pinna normal.   Lungs: Normal respiratory effort, clear to auscultation bilaterally with good excursion.  CV: regular rate and rhythm. No murmurs.   Abdomen: soft, non distended, nontender, Bowel sound normal.  Ext: no edema, color normal, vascularity normal, temperature normal        Assessment and Plan:   The following treatment plan was discussed     1. Pure hypercholesterolemia  - Well-controlled. Continue current regimens. Recheck lab 1-2 weeks before next follow up visit.  - Advised " to take omega-3 once a day to improve HDL.  - Advised to eat low fat, low carbohydrate and high fiber diet as well as do cardio physical exercise regularly.   - COMP METABOLIC PANEL; Future  - LIPID PROFILE; Future    2. Postural dizziness with presyncope  - Symptom resolved. His blood pressure is well controlled.  - He has normal carotid ultrasound and echocardiogram on 11/9/17. Review his carotid ultrasound and echocardiogram with him today.    3. Essential hypertension  - Well-controlled. Continue current regimens. Recheck lab 1-2 weeks before next follow up visit.  - Recommend to monitor blood pressure and heart rate at home.    4. IFG (impaired fasting glucose)  - Not well controlled. We discussed to eat low carbohydrate diet and avoid simple carbohydrate and process sugar.  - COMP METABOLIC PANEL; Future  - HEMOGLOBIN A1C; Future    5. Onychomycosis  - Well-controlled. Refill medications today. Discussed the use and side effects of medication with him.  - Ciclopirox 0.77 % Gel; Apply once a day for great toes and fifth toes nails on both feet.  Dispense: 1 Tube; Refill: 3    6. Health Maintenance  - Patient stated that he will call [facility] for follow-up with them for colonoscopy.     Followup: Return in about 6 months (around 5/15/2018), or if symptoms worsen or fail to improve, for hypertension, hyperlipidemia, impaired fasting glucose, lab review.      Please note that this dictation was created using voice recognition software. I have made every reasonable attempt to correct obvious errors, but I expect that there may have unintended errors in text, spelling, punctuation, or grammar that I did not discover.

## 2017-11-15 NOTE — ASSESSMENT & PLAN NOTE
Patient is using ciclopirox gel every day on both great toenails. He feels improvement with medication. He requested to refill the gel today.

## 2018-02-12 ENCOUNTER — OFFICE VISIT (OUTPATIENT)
Dept: MEDICAL GROUP | Age: 63
End: 2018-02-12
Payer: COMMERCIAL

## 2018-02-12 VITALS
SYSTOLIC BLOOD PRESSURE: 130 MMHG | HEIGHT: 69 IN | WEIGHT: 187.4 LBS | OXYGEN SATURATION: 96 % | TEMPERATURE: 98.7 F | DIASTOLIC BLOOD PRESSURE: 78 MMHG | HEART RATE: 89 BPM | BODY MASS INDEX: 27.76 KG/M2

## 2018-02-12 DIAGNOSIS — R73.01 IFG (IMPAIRED FASTING GLUCOSE): ICD-10-CM

## 2018-02-12 DIAGNOSIS — E78.00 PURE HYPERCHOLESTEROLEMIA: ICD-10-CM

## 2018-02-12 DIAGNOSIS — I10 ESSENTIAL HYPERTENSION: ICD-10-CM

## 2018-02-12 PROCEDURE — 99214 OFFICE O/P EST MOD 30 MIN: CPT | Performed by: INTERNAL MEDICINE

## 2018-02-12 ASSESSMENT — PAIN SCALES - GENERAL: PAINLEVEL: NO PAIN

## 2018-02-12 NOTE — PROGRESS NOTES
Subjective:   Karsten Jaime is a 62 y.o. male here today for evaluation and management of:      Essential hypertension  Patient reported that he did not take his cholesterol medication and Blood pressure medication on last Friday and he felt dizzy and discomfort. He did not go to work on Friday and Today. He needs a letter to return back to work. He stated that his symptom resolved after taking his regular blood pressure medications and simvastatin. He does not have chest pain or shortness of breath or headache or dizziness currently. His blood pressure in clinic was 130/78. He checked his blood pressure at pharmacy once in a while, but he did not check his blood pressure regular basis at home.    Pure hypercholesterolemia  Patient is taking simvastatin 20 mg every evening. He denies side effects from taking it. Last lipid panel on 11/6/17 showed LDL 54.    IFG (impaired fasting glucose)  Patient likes to eat carbohydrates. He does not have regular physical exercise. He will try to cut down carbohydrate intake. Last A1c was 6.1 on 11/6/17.         Current medicines (including changes today)  Current Outpatient Prescriptions   Medication Sig Dispense Refill   • Ciclopirox 0.77 % Gel Apply once a day for great toes and fifth toes nails on both feet. 1 Tube 3   • valsartan (DIOVAN) 160 MG Tab TAKE 1 TABLET BY MOUTH EVERY DAY. 30 Tab 12   • triamterene/hctz (MAXZIDE-25/DYAZIDE) 37.5-25 MG Cap TAKE 1 CAPSULE BY MOUTH EVERY DAY. 30 Cap 11   • simvastatin (ZOCOR) 20 MG Tab TAKE 1 TAB BY MOUTH EVERY EVENING. 30 Tab 11   • mometasone (NASONEX) 50 MCG/ACT nasal spray Spray 2 Sprays in nose every day. 1 Inhaler 3   • fexofenadine (ALLEGRA ALLERGY) 180 MG tablet Take 180 mg by mouth every day.     • pantoprazole (PROTONIX) 40 MG TBEC Take 40 mg by mouth every day.       No current facility-administered medications for this visit.      He  has a past medical history of GERD (gastroesophageal reflux disease);  "Hyperlipidemia; and Hypertension.    ROS   No chest pain, no shortness of breath, no abdominal pain       Objective:     Blood pressure 130/78, pulse 89, temperature 37.1 °C (98.7 °F), height 1.753 m (5' 9\"), weight 85 kg (187 lb 6.4 oz), SpO2 96 %. Body mass index is 27.67 kg/m².   Physical Exam:  General: Alert, oriented and no acute distress.  Eye contact is good, speech goal directed, affect calm  HEENT: conjunctiva non-injected, sclera non-icteric.  Oral mucous membranes pink and moist with no lesions.  Pinna normal.   Lungs: Normal respiratory effort, clear to auscultation bilaterally with good excursion.  CV: regular rate and rhythm. No murmurs.  Abdomen: soft, non distended, nontender, Bowel sound normal.  Ext: no edema, color normal, vascularity normal, temperature normal         Assessment and Plan:   The following treatment plan was discussed     1. Essential hypertension  - Well-controlled with current regimens, valsartan 160 mg daily and triamterene-hydrochlorothiazide 37.5-25 mg daily. Counseling for compliance with medications.   - Recommend to monitor blood pressure and heart rate at home.  - Recommend to do regular physical exercise.  - Provide a letter to return to work tomorrow without restriction.  - Discussed ED precautions with patient: seek emergency evaluation for symptoms including but not limited to: worsening symptoms or developing any new symptoms, crushing chest pain, chest pain associated with difficulty breathing, nausea, or sweats, heart rate irregular or too fast to count.   - Any change or worsening of signs or symptoms, patient encouraged to follow-up or report to emergency room for further evaluation. Patient understands and agrees    2. IFG (impaired fasting glucose)  - Advised to eat low fat, low carbohydrate and high fiber diet as well as do cardio physical exercise regularly.     3. Pure hypercholesterolemia  - Well-controlled. Continue current regimen, simvastatin 20 mg every " evening. Recheck lab 1-2 weeks before next follow up visit.    4. Health Maintenance   - Patient needs to follow-up on colonoscopy. He stated that he will call [facility] to schedule colonoscopy with them.    Followup: Return in about 3 months (around 5/15/2018), or if symptoms worsen or fail to improve, for hypertension, dyslipidemia, prediabetes, lab review.      Please note that this dictation was created using voice recognition software. I have made every reasonable attempt to correct obvious errors, but I expect that there may have unintended errors in text, spelling, punctuation, or grammar that I did not discover.

## 2018-02-12 NOTE — ASSESSMENT & PLAN NOTE
Patient reported that he did not take his cholesterol medication and Blood pressure medication on last Friday and he felt dizzy and discomfort. He did not go to work on Friday and Today. He needs a letter to return back to work. He stated that his symptom resolved after taking his regular blood pressure medications and simvastatin. He does not have chest pain or shortness of breath or headache or dizziness currently. His blood pressure in clinic was 130/78. He checked his blood pressure at pharmacy once in a while, but he did not check his blood pressure regular basis at home.

## 2018-02-12 NOTE — LETTER
February 12, 2018       Patient: Karsten Jaime   YOB: 1955   Date of Visit: 2/12/2018         To Whom It May Concern:    Please excuse work for Mr. Karsten Jaime on 2/9/18 and 2/12/18.   It is my medical opinion that Karsten Jaime return to full duty, no restrictions on 2/13/18.    If you have any questions or concerns, please don't hesitate to call [phone]          Sincerely,          Eloisa Bolden M.D.  Electronically Signed

## 2018-02-12 NOTE — ASSESSMENT & PLAN NOTE
Patient likes to eat carbohydrates. He does not have regular physical exercise. He will try to cut down carbohydrate intake. Last A1c was 6.1 on 11/6/17.

## 2018-02-12 NOTE — ASSESSMENT & PLAN NOTE
Patient is taking simvastatin 20 mg every evening. He denies side effects from taking it. Last lipid panel on 11/6/17 showed LDL 54.

## 2018-03-21 RX ORDER — FLUTICASONE PROPIONATE 50 MCG
1 SPRAY, SUSPENSION (ML) NASAL DAILY
Qty: 1 BOTTLE | Refills: 3 | Status: SHIPPED | OUTPATIENT
Start: 2018-03-21 | End: 2018-12-23

## 2018-04-11 DIAGNOSIS — I10 ESSENTIAL HYPERTENSION: ICD-10-CM

## 2018-04-11 DIAGNOSIS — E78.00 PURE HYPERCHOLESTEROLEMIA: ICD-10-CM

## 2018-04-11 RX ORDER — TRIAMTERENE AND HYDROCHLOROTHIAZIDE 37.5; 25 MG/1; MG/1
CAPSULE ORAL
Qty: 30 CAP | Refills: 11 | Status: SHIPPED | OUTPATIENT
Start: 2018-04-11 | End: 2019-03-27 | Stop reason: SDUPTHER

## 2018-04-11 RX ORDER — SIMVASTATIN 20 MG
TABLET ORAL
Qty: 30 TAB | Refills: 11 | Status: SHIPPED | OUTPATIENT
Start: 2018-04-11 | End: 2019-04-03 | Stop reason: SDUPTHER

## 2018-05-08 ENCOUNTER — HOSPITAL ENCOUNTER (OUTPATIENT)
Dept: LAB | Facility: MEDICAL CENTER | Age: 63
End: 2018-05-08
Attending: INTERNAL MEDICINE
Payer: COMMERCIAL

## 2018-05-08 DIAGNOSIS — E78.00 PURE HYPERCHOLESTEROLEMIA: ICD-10-CM

## 2018-05-08 DIAGNOSIS — R73.01 IFG (IMPAIRED FASTING GLUCOSE): ICD-10-CM

## 2018-05-08 LAB
ALBUMIN SERPL BCP-MCNC: 3.7 G/DL (ref 3.2–4.9)
ALBUMIN/GLOB SERPL: 1.3 G/DL
ALP SERPL-CCNC: 73 U/L (ref 30–99)
ALT SERPL-CCNC: 20 U/L (ref 2–50)
ANION GAP SERPL CALC-SCNC: 5 MMOL/L (ref 0–11.9)
AST SERPL-CCNC: 21 U/L (ref 12–45)
BILIRUB SERPL-MCNC: 0.9 MG/DL (ref 0.1–1.5)
BUN SERPL-MCNC: 16 MG/DL (ref 8–22)
CALCIUM SERPL-MCNC: 8.8 MG/DL (ref 8.5–10.5)
CHLORIDE SERPL-SCNC: 107 MMOL/L (ref 96–112)
CHOLEST SERPL-MCNC: 142 MG/DL (ref 100–199)
CO2 SERPL-SCNC: 28 MMOL/L (ref 20–33)
CREAT SERPL-MCNC: 1.12 MG/DL (ref 0.5–1.4)
GLOBULIN SER CALC-MCNC: 2.9 G/DL (ref 1.9–3.5)
GLUCOSE SERPL-MCNC: 113 MG/DL (ref 65–99)
HDLC SERPL-MCNC: 41 MG/DL
LDLC SERPL CALC-MCNC: 67 MG/DL
POTASSIUM SERPL-SCNC: 3.5 MMOL/L (ref 3.6–5.5)
PROT SERPL-MCNC: 6.6 G/DL (ref 6–8.2)
SODIUM SERPL-SCNC: 140 MMOL/L (ref 135–145)
TRIGL SERPL-MCNC: 171 MG/DL (ref 0–149)

## 2018-05-08 PROCEDURE — 83036 HEMOGLOBIN GLYCOSYLATED A1C: CPT

## 2018-05-08 PROCEDURE — 80053 COMPREHEN METABOLIC PANEL: CPT

## 2018-05-08 PROCEDURE — 80061 LIPID PANEL: CPT

## 2018-05-08 PROCEDURE — 36415 COLL VENOUS BLD VENIPUNCTURE: CPT

## 2018-05-10 LAB
EST. AVERAGE GLUCOSE BLD GHB EST-MCNC: 126 MG/DL
HBA1C MFR BLD: 6 % (ref 0–5.6)

## 2018-05-15 ENCOUNTER — OFFICE VISIT (OUTPATIENT)
Dept: MEDICAL GROUP | Age: 63
End: 2018-05-15
Payer: COMMERCIAL

## 2018-05-15 VITALS
HEART RATE: 73 BPM | TEMPERATURE: 98.1 F | DIASTOLIC BLOOD PRESSURE: 70 MMHG | OXYGEN SATURATION: 93 % | SYSTOLIC BLOOD PRESSURE: 134 MMHG | BODY MASS INDEX: 27.85 KG/M2 | HEIGHT: 69 IN | WEIGHT: 188 LBS

## 2018-05-15 DIAGNOSIS — I10 ESSENTIAL HYPERTENSION: ICD-10-CM

## 2018-05-15 DIAGNOSIS — E78.00 PURE HYPERCHOLESTEROLEMIA: ICD-10-CM

## 2018-05-15 DIAGNOSIS — R73.01 IFG (IMPAIRED FASTING GLUCOSE): ICD-10-CM

## 2018-05-15 PROCEDURE — 99214 OFFICE O/P EST MOD 30 MIN: CPT | Performed by: INTERNAL MEDICINE

## 2018-05-15 ASSESSMENT — PAIN SCALES - GENERAL: PAINLEVEL: NO PAIN

## 2018-05-15 NOTE — PROGRESS NOTES
Subjective:   Purvi Jaime is a 63 y.o. male here today for evaluation and management of:      Essential hypertension  Patient stated that he is doing better and denied dizziness, chest pain or headache. His blood pressure is stable with current regimens. He is taking triamterene-hydrochlorothiazide 37.5-25 mg daily and valsartan 160 mg daily. His recent blood tests showed slightly low potassium at 3.5. Kidney function was normal on 5/8/18.    IFG (impaired fasting glucose)  Patient still has elevated fasting blood sugar at 113, with A1c 6 on 5/8/18. Patient likes to eat high carbohydrate diet. He stated that he does not have regular physical exercise yet. We discussed diabetes diet and regular physical exercise.    Pure hypercholesterolemia  Patient is taking simvastatin 20 mg every evening. His LDL at goal but triglycerides remain high. He denies side effects from taking simvastatin. We discussed that on omega-3 1200 mg twice a day with meal.    Results for PURVI JAIME (MRN [id number]) as of 5/15/2018 11:52   Ref. Range 5/8/2018 08:13   Cholesterol,Tot Latest Ref Range: 100 - 199 mg/dL 142   Triglycerides Latest Ref Range: 0 - 149 mg/dL 171 (H)   HDL Latest Ref Range: >=40 mg/dL 41   LDL Latest Ref Range: <100 mg/dL 67          Current medicines (including changes today)  Current Outpatient Prescriptions   Medication Sig Dispense Refill   • simvastatin (ZOCOR) 20 MG Tab TAKE 1 TAB BY MOUTH EVERY EVENING. 30 Tab 11   • triamterene/hctz (MAXZIDE-25/DYAZIDE) 37.5-25 MG Cap TAKE 1 CAPSULE BY MOUTH EVERY DAY. 30 Cap 11   • fluticasone (FLONASE) 50 MCG/ACT nasal spray Spray 1 Spray in nose every day. Each Nostril 1 Bottle 3   • Ciclopirox 0.77 % Gel Apply once a day for great toes and fifth toes nails on both feet. 1 Tube 3   • valsartan (DIOVAN) 160 MG Tab TAKE 1 TABLET BY MOUTH EVERY DAY. 30 Tab 12   • fexofenadine (ALLEGRA ALLERGY) 180 MG tablet Take 180 mg by mouth every day.     • pantoprazole  "(PROTONIX) 40 MG TBEC Take 40 mg by mouth every day.       No current facility-administered medications for this visit.      He  has a past medical history of GERD (gastroesophageal reflux disease); Hyperlipidemia; and Hypertension.    ROS   No chest pain, no shortness of breath, no abdominal pain       Objective:     Blood pressure 134/70, pulse 73, temperature 36.7 °C (98.1 °F), height 1.753 m (5' 9\"), weight 85.3 kg (188 lb), SpO2 93 %. Body mass index is 27.76 kg/m².   Physical Exam:  General: Alert, oriented and no acute distress.  Eye contact is good, speech goal directed, affect calm  HEENT: conjunctiva non-injected, sclera non-icteric.  Oral mucous membranes pink and moist with no lesions.  Pinna normal.   Lungs: Normal respiratory effort, clear to auscultation bilaterally with good excursion.  CV: regular rate and rhythm. No murmurs.   Abdomen: soft, non distended, nontender, Bowel sound normal.  Ext: no edema, color normal, vascularity normal, temperature normal        Assessment and Plan:   The following treatment plan was discussed     1. Essential hypertension  - Well-controlled. Continue current regimens. Recheck lab 1-2 weeks before next follow up visit.  - Recommend to monitor blood pressure and heart rate at home.  - CBC WITH DIFFERENTIAL; Future  - COMP METABOLIC PANEL; Future    2. IFG (impaired fasting glucose)  - Discussed at length for diabetes diet. Recommend to do regular physical exercise.  - Recheck lab in 6 months.  - COMP METABOLIC PANEL; Future  - HEMOGLOBIN A1C; Future    3. Pure hypercholesterolemia  - LDL at goal triglyceride remains high. Continue simvastatin 20 mg every evening and add on omega-3 1200 mg twice a day with meal.  - Advised to eat low fat, low carbohydrate and high fiber diet as well as do cardio physical exercise regularly.   - COMP METABOLIC PANEL; Future  - LIPID PROFILE; Future    4. Hypokalemia  - Slightly low hypokalemia at 3.5. Patient is taking triamterene and " hydrochlorothiazide. He also takes Diovan 160 mg daily.  - Patient with try over-the-counter potassium supplements. We also discussed potassium rich diet.    5. Health Maintenance   - Patient has colonoscopy with [facility] in 2014. He has polyps removed and he needs to repeat colonoscopy. Patient will call to schedule with [facility] for colonoscopy.      Followup: Return in about 6 months (around 11/15/2018), or if symptoms worsen or fail to improve, for hypertension, hyperlipidemia, GERD, prediabetes, and lab review.      Please note that this dictation was created using voice recognition software. I have made every reasonable attempt to correct obvious errors, but I expect that there may have unintended errors in text, spelling, punctuation, or grammar that I did not discover.

## 2018-05-15 NOTE — PATIENT INSTRUCTIONS
1800 Calorie Diet for Diabetes Meal Planning    The 1800 calorie diet is designed for eating up to 1800 calories each day. Following this diet and making healthy meal choices can help improve overall health. This diet controls blood sugar (glucose) levels and can also help lower blood pressure and cholesterol.  SERVING SIZES  Measuring foods and serving sizes helps to make sure you are getting the right amount of food. The list below tells how big or small some common serving sizes are:  · 1 oz.........4 stacked dice.   · 3 oz.........Deck of cards.   · 1 tsp........Tip of little finger.   · 1 tbs........Thumb.   · 2 tbs........Golf ball.   · ½ cup.......Half of a fist.   · 1 cup........A fist.   GUIDELINES FOR CHOOSING FOODS  The goal of this diet is to eat a variety of foods and limit calories to 1800 each day. This can be done by choosing foods that are low in calories and fat. The diet also suggests eating small amounts of food frequently. Doing this helps control your blood glucose levels so they do not get too high or too low. Each meal or snack may include a protein food source to help you feel more satisfied and to stabilize your blood glucose. Try to eat about the same amount of food around the same time each day. This includes weekend days, travel days, and days off work. Space your meals about 4 to 5 hours apart and add a snack between them if you wish.   For example, a daily food plan could include breakfast, a morning snack, lunch, dinner, and an evening snack. Healthy meals and snacks include whole grains, vegetables, fruits, lean meats, poultry, fish, and dairy products. As you plan your meals, select a variety of foods. Choose from the bread and starch, vegetable, fruit, dairy, and meat/protein groups. Examples of foods from each group and their suggested serving sizes are listed below. Use measuring cups and spoons to become familiar with what a healthy portion looks like.  Bread and Starch  Each  serving equals 15 grams of carbohydrates.  · 1 slice bread.   · ¼ bagel.   · ¾ cup cold cereal (unsweetened).   · ½ cup hot cereal or mashed potatoes.   · 1 small potato (size of a computer mouse).   ·  cup cooked pasta or rice.   · ½ English muffin.   · 1 cup broth-based soup.   · 3 cups of popcorn.   · 4 to 6 whole-wheat crackers.   · ½ cup cooked beans, peas, or corn.   Vegetable  Each serving equals 5 grams of carbohydrates.  · ½ cup cooked vegetables.   · 1 cup raw vegetables.   · ½ cup tomato or vegetable juice.   Fruit  Each serving equals 15 grams of carbohydrates.  · 1 small apple or orange.   · 1¼ cup watermelon or strawberries.   · ½ cup applesauce (no sugar added).   · 2 tbs raisins.   · ½ banana.   · ½ cup canned fruit, packed in water, its own juice, or sweetened with a sugar substitute.   · ½ cup unsweetened fruit juice.   Dairy  Each serving equals 12 to 15 grams of carbohydrates.  · 1 cup fat-free milk.   · 6 oz artificially sweetened yogurt or plain yogurt.   · 1 cup low-fat buttermilk.   · 1 cup soy milk.   · 1 cup almond milk.   Meat/Protein  · 1 large egg.   · 2 to 3 oz meat, poultry, or fish.   · ¼ cup low-fat cottage cheese.   · 1 tbs peanut butter.   · 1 oz low-fat cheese.   · ¼ cup tuna in water.   · ½ cup tofu.   Fat  · 1 tsp oil.   · 1 tsp trans-fat-free margarine.   · 1 tsp butter.   · 1 tsp mayonnaise.   · 2 tbs avocado.   · 1 tbs salad dressing.   · 1 tbs cream cheese.   · 2 tbs sour cream.   SAMPLE 1800 CALORIE DIET PLAN  Breakfast  · ¾ cup unsweetened cereal (1 carb serving).   · 1 cup fat-free milk (1 carb serving).   · 1 slice whole-wheat toast (1 carb serving).   · ½ small banana (1 carb serving).   · 1 scrambled egg.   · 1 tsp trans-fat-free margarine.   Lunch  · Tuna sandwich.   · 2 slices whole-wheat bread (2 carb servings).   · ½ cup canned tuna in water, drained.   · 1 tbs reduced fat mayonnaise.   · 1 stalk celery, chopped.   · 2 slices tomato.   · 1 lettuce leaf.   · 1 cup  carrot sticks.   · 24 to 30 seedless grapes (2 carb servings).   · 6 oz light yogurt (1 carb serving).   Afternoon Snack  · 3 darlene cracker squares (1 carb serving).   · Fat-free milk, 1 cup (1 carb serving).   · 1 tbs peanut butter.   Dinner  · 3 oz salmon, broiled with 1 tsp oil.   · 1 cup mashed potatoes (2 carb servings) with 1 tsp trans-fat-free margarine.   · 1 cup fresh or frozen green beans.   · 1 cup steamed asparagus.   · 1 cup fat-free milk (1 carb serving).   Evening Snack  · 3 cups air-popped popcorn (1 carb serving).   · 2 tbs parmesan cheese sprinkled on top.   MEAL PLAN  Use this worksheet to help you make a daily meal plan based on the 1800 calorie diet suggestions. If you are using this plan to help you control your blood glucose, you may interchange carbohydrate-containing foods (dairy, starches, and fruits). Select a variety of fresh foods of varying colors and flavors. The total amount of carbohydrate in your meals or snacks is more important than making sure you include all of the food groups every time you eat. Choose from the following foods to build your day's meals:  · 8 Starches.   · 4 Vegetables.   · 3 Fruits.   · 2 Dairy.   · 6 to 7 oz Meat/Protein.   · Up to 4 Fats.   Your dietician can use this worksheet to help you decide how many servings and which types of foods are right for you.  BREAKFAST  Food Group and Servings / Food Choice  Starch ________________________________________________________  Dairy _________________________________________________________  Fruit _________________________________________________________  Meat/Protein __________________________________________________  Fat ___________________________________________________________  LUNCH  Food Group and Servings / Food Choice  Starch ________________________________________________________  Meat/Protein __________________________________________________  Vegetable  _____________________________________________________  Fruit _________________________________________________________  Dairy _________________________________________________________  Fat ___________________________________________________________  AFTERNOON SNACK  Food Group and Servings / Food Choice  Starch ________________________________________________________  Meat/Protein __________________________________________________  Fruit __________________________________________________________  Dairy _________________________________________________________  DINNER  Food Group and Servings / Food Choice  Starch _________________________________________________________  Meat/Protein ___________________________________________________  Dairy __________________________________________________________  Vegetable ______________________________________________________  Fruit ___________________________________________________________  Fat ____________________________________________________________  EVENING SNACK  Food Group and Servings / Food Choice  Fruit __________________________________________________________  Meat/Protein ___________________________________________________  Dairy __________________________________________________________  Starch _________________________________________________________  DAILY TOTALS  Starch ____________________________  Vegetable _________________________  Fruit _____________________________  Dairy _____________________________  Meat/Protein______________________  Fat _______________________________  Document Released: 07/10/2006 Document Revised: 03/11/2013 Document Reviewed: 11/02/2012  [employer]® Patient Information ©2013 [facility].

## 2018-05-15 NOTE — ASSESSMENT & PLAN NOTE
Patient still has elevated fasting blood sugar at 113, with A1c 6 on 5/8/18. Patient likes to eat high carbohydrate diet. He stated that he does not have regular physical exercise yet. We discussed diabetes diet and regular physical exercise.

## 2018-05-15 NOTE — ASSESSMENT & PLAN NOTE
Patient stated that he is doing better and denied dizziness, chest pain or headache. His blood pressure is stable with current regimens. He is taking triamterene-hydrochlorothiazide 37.5-25 mg daily and valsartan 160 mg daily. His recent blood tests showed slightly low potassium at 3.5. Kidney function was normal on 5/8/18.

## 2018-05-15 NOTE — ASSESSMENT & PLAN NOTE
Patient is taking simvastatin 20 mg every evening. His LDL at goal but triglycerides remain high. He denies side effects from taking simvastatin. We discussed that on omega-3 1200 mg twice a day with meal.    Results for PURVI GRACE (MRN [id number]) as of 5/15/2018 11:52   Ref. Range 5/8/2018 08:13   Cholesterol,Tot Latest Ref Range: 100 - 199 mg/dL 142   Triglycerides Latest Ref Range: 0 - 149 mg/dL 171 (H)   HDL Latest Ref Range: >=40 mg/dL 41   LDL Latest Ref Range: <100 mg/dL 67

## 2018-06-13 DIAGNOSIS — I10 ESSENTIAL HYPERTENSION: ICD-10-CM

## 2018-06-13 RX ORDER — VALSARTAN 160 MG/1
TABLET ORAL
Qty: 90 TAB | Refills: 3 | Status: SHIPPED | OUTPATIENT
Start: 2018-06-13 | End: 2018-06-14 | Stop reason: SDUPTHER

## 2018-06-14 DIAGNOSIS — I10 ESSENTIAL HYPERTENSION: ICD-10-CM

## 2018-06-14 RX ORDER — VALSARTAN 160 MG/1
160 TABLET ORAL DAILY
Qty: 90 TAB | Refills: 3 | Status: SHIPPED | OUTPATIENT
Start: 2018-06-14 | End: 2018-11-13 | Stop reason: CLARIF

## 2018-10-19 ENCOUNTER — OFFICE VISIT (OUTPATIENT)
Dept: MEDICAL GROUP | Age: 63
End: 2018-10-19
Payer: COMMERCIAL

## 2018-10-19 ENCOUNTER — HOSPITAL ENCOUNTER (OUTPATIENT)
Dept: LAB | Facility: MEDICAL CENTER | Age: 63
End: 2018-10-19
Attending: INTERNAL MEDICINE
Payer: COMMERCIAL

## 2018-10-19 VITALS
HEART RATE: 76 BPM | OXYGEN SATURATION: 91 % | SYSTOLIC BLOOD PRESSURE: 132 MMHG | HEIGHT: 69 IN | BODY MASS INDEX: 28.14 KG/M2 | TEMPERATURE: 97.9 F | WEIGHT: 190 LBS | DIASTOLIC BLOOD PRESSURE: 88 MMHG

## 2018-10-19 DIAGNOSIS — E78.00 PURE HYPERCHOLESTEROLEMIA: ICD-10-CM

## 2018-10-19 DIAGNOSIS — H53.8 BLURRED VISION, BILATERAL: ICD-10-CM

## 2018-10-19 DIAGNOSIS — R73.01 IFG (IMPAIRED FASTING GLUCOSE): ICD-10-CM

## 2018-10-19 LAB — ERYTHROCYTE [SEDIMENTATION RATE] IN BLOOD BY WESTERGREN METHOD: 3 MM/HOUR (ref 0–20)

## 2018-10-19 PROCEDURE — 85652 RBC SED RATE AUTOMATED: CPT

## 2018-10-19 PROCEDURE — 86141 C-REACTIVE PROTEIN HS: CPT

## 2018-10-19 PROCEDURE — 99214 OFFICE O/P EST MOD 30 MIN: CPT | Performed by: INTERNAL MEDICINE

## 2018-10-19 PROCEDURE — 36415 COLL VENOUS BLD VENIPUNCTURE: CPT

## 2018-10-19 ASSESSMENT — ENCOUNTER SYMPTOMS
PSYCHIATRIC NEGATIVE: 1
EYES NEGATIVE: 1
RESPIRATORY NEGATIVE: 1
GASTROINTESTINAL NEGATIVE: 1
MUSCULOSKELETAL NEGATIVE: 1
NEUROLOGICAL NEGATIVE: 1
CARDIOVASCULAR NEGATIVE: 1
CONSTITUTIONAL NEGATIVE: 1

## 2018-10-19 ASSESSMENT — PATIENT HEALTH QUESTIONNAIRE - PHQ9: CLINICAL INTERPRETATION OF PHQ2 SCORE: 0

## 2018-10-19 NOTE — PROGRESS NOTES
"Subjective:      Karsten Jaime is a 63 y.o. male who presents with Referral Needed ophthalmology for blurry vision   Patient had cataract removal with lens implants several months ago.  Now has blurry vision.  No double vision.  No headache.  Carotid ultrasound scanning a year ago was negative.  Needs referral back to ophthalmology for further evaluation.          HPI    Review of Systems   Constitutional: Negative.    HENT: Negative.    Eyes: Negative.    Respiratory: Negative.    Cardiovascular: Negative.    Gastrointestinal: Negative.    Genitourinary: Negative.    Musculoskeletal: Negative.    Skin: Negative.    Neurological: Negative.    Endo/Heme/Allergies: Negative.    Psychiatric/Behavioral: Negative.           Objective:     /88 (BP Location: Right arm, Patient Position: Sitting)   Pulse 76   Temp 36.6 °C (97.9 °F) (Temporal)   Ht 1.753 m (5' 9.02\")   Wt 86.2 kg (190 lb)   SpO2 91%   BMI 28.05 kg/m²      Physical Exam   Constitutional: He is oriented to person, place, and time. He appears well-developed and well-nourished. No distress.   HENT:   Head: Normocephalic and atraumatic.   Right Ear: External ear normal.   Left Ear: External ear normal.   Nose: Nose normal.   Mouth/Throat: Oropharynx is clear and moist. No oropharyngeal exudate.   Eyes: Pupils are equal, round, and reactive to light. Conjunctivae and EOM are normal. Right eye exhibits no discharge. Left eye exhibits no discharge. No scleral icterus.   Neck: Normal range of motion. Neck supple. No JVD present. No tracheal deviation present. No thyromegaly present.   Cardiovascular: Normal rate, regular rhythm, normal heart sounds and intact distal pulses.  Exam reveals no gallop and no friction rub.    No murmur heard.  Pulmonary/Chest: Effort normal and breath sounds normal. No stridor. No respiratory distress. He has no wheezes. He has no rales. He exhibits no tenderness.   Abdominal: Soft. Bowel sounds are normal. He exhibits " no distension and no mass. There is no tenderness. There is no rebound and no guarding.   Musculoskeletal: Normal range of motion. He exhibits no edema or tenderness.   Lymphadenopathy:     He has no cervical adenopathy.   Neurological: He is alert and oriented to person, place, and time. He has normal reflexes. He displays normal reflexes. He exhibits normal muscle tone. Coordination normal.   Skin: Skin is warm and dry. No rash noted. He is not diaphoretic. No erythema. No pallor.   Psychiatric: He has a normal mood and affect. His behavior is normal. Judgment and thought content normal.     No visits with results within 1 Month(s) from this visit.   Latest known visit with results is:   Hospital Outpatient Visit on 05/08/2018   Component Date Value   • Sodium 05/08/2018 140    • Potassium 05/08/2018 3.5*   • Chloride 05/08/2018 107    • Co2 05/08/2018 28    • Anion Gap 05/08/2018 5.0    • Glucose 05/08/2018 113*   • Bun 05/08/2018 16    • Creatinine 05/08/2018 1.12    • Calcium 05/08/2018 8.8    • AST(SGOT) 05/08/2018 21    • ALT(SGPT) 05/08/2018 20    • Alkaline Phosphatase 05/08/2018 73    • Total Bilirubin 05/08/2018 0.9    • Albumin 05/08/2018 3.7    • Total Protein 05/08/2018 6.6    • Globulin 05/08/2018 2.9    • A-G Ratio 05/08/2018 1.3    • Glycohemoglobin 05/08/2018 6.0*   • Est Avg Glucose 05/08/2018 126    • Cholesterol,Tot 05/08/2018 142    • Triglycerides 05/08/2018 171*   • HDL 05/08/2018 41    • LDL 05/08/2018 67    • GFR If  05/08/2018 >60    • GFR If Non  Ameri* 05/08/2018 >60       Lab Results   Component Value Date/Time    HBA1C 6.0 (H) 05/08/2018 08:13 AM    HBA1C 6.1 (H) 11/06/2017 08:15 AM     Lab Results   Component Value Date/Time    SODIUM 140 05/08/2018 08:13 AM    POTASSIUM 3.5 (L) 05/08/2018 08:13 AM    CHLORIDE 107 05/08/2018 08:13 AM    CO2 28 05/08/2018 08:13 AM    GLUCOSE 113 (H) 05/08/2018 08:13 AM    BUN 16 05/08/2018 08:13 AM    CREATININE 1.12 05/08/2018  08:13 AM    CREATININE 0.96 03/31/2011 12:00 AM    BUNCREATRAT 16 03/31/2011 12:00 AM    GLOMRATE >59 03/31/2011 12:00 AM    ALKPHOSPHAT 73 05/08/2018 08:13 AM    ASTSGOT 21 05/08/2018 08:13 AM    ALTSGPT 20 05/08/2018 08:13 AM    TBILIRUBIN 0.9 05/08/2018 08:13 AM     No results found for: INR  Lab Results   Component Value Date/Time    CHOLSTRLTOT 142 05/08/2018 08:13 AM    LDL 67 05/08/2018 08:13 AM    HDL 41 05/08/2018 08:13 AM    TRIGLYCERIDE 171 (H) 05/08/2018 08:13 AM       No results found for: TESTOSTERONE  No results found for: TSH  No results found for: FREET4  No results found for: URICACID  No components found for: VITB12  No results found for: 25HYDROXY              Assessment/Plan:                  3. Pure hypercholesterolemia     - CRP HIGH SENSITIVE (CARDIAC); Future    4. IFG (impaired fasting glucose)    diet/exercise/lose 15 lbs.; patient counseled      5. Blurred vision, bilateral--unclear etiology.  Eye exam today by me was unremarkable.  We will check sed rate to rule out vasculitis.  Refer back to ophthalmology.  Previous lab was unremarkable.  No headache to suggest CNS tumor or aneurysm or vasculitis.     - WESTERGREN SED RATE; Future  - REFERRAL TO OPHTHALMOLOGY    30 minute face-to-face encounter took place today.  More than half of this time was spent in the coordination of care of the above problems, as well as counseling.

## 2018-10-20 LAB — CRP SERPL HS-MCNC: 1.3 MG/L (ref 0–7.5)

## 2018-10-23 ENCOUNTER — TELEPHONE (OUTPATIENT)
Dept: MEDICAL GROUP | Age: 63
End: 2018-10-23

## 2018-10-23 NOTE — TELEPHONE ENCOUNTER
Phone Number Called: [phone] (home)       Message: I left a voicemail for patient to call back to inform them of message below.      Left Message for patient to call back: yes

## 2018-10-23 NOTE — TELEPHONE ENCOUNTER
----- Message from Kimberly Ramirez M.D. sent at 10/23/2018  8:10 AM PDT -----  Please call pt & and advise her that the inflammation markers ordered by Dr. Aguirre were negative.  Patient should discuss this finding with Dr. Bolden at follow-up visit on November 12, 2018.

## 2018-10-24 NOTE — TELEPHONE ENCOUNTER
Phone Number Called: [phone] (home)     Message: I left a voicemail for patient to call back to inform them of message below.  Letter Sent.    Left Message for patient to call back: yes

## 2018-11-12 ENCOUNTER — OFFICE VISIT (OUTPATIENT)
Dept: MEDICAL GROUP | Age: 63
End: 2018-11-12
Payer: COMMERCIAL

## 2018-11-12 VITALS
BODY MASS INDEX: 28.35 KG/M2 | DIASTOLIC BLOOD PRESSURE: 80 MMHG | TEMPERATURE: 97.5 F | OXYGEN SATURATION: 97 % | HEART RATE: 73 BPM | WEIGHT: 191.4 LBS | HEIGHT: 69 IN | SYSTOLIC BLOOD PRESSURE: 122 MMHG

## 2018-11-12 DIAGNOSIS — J30.1 SEASONAL ALLERGIC RHINITIS DUE TO POLLEN: ICD-10-CM

## 2018-11-12 DIAGNOSIS — H53.8 BLURRED VISION, BILATERAL: ICD-10-CM

## 2018-11-12 DIAGNOSIS — K21.9 GASTROESOPHAGEAL REFLUX DISEASE WITHOUT ESOPHAGITIS: ICD-10-CM

## 2018-11-12 DIAGNOSIS — Z00.00 ROUTINE GENERAL MEDICAL EXAMINATION AT HEALTH CARE FACILITY: ICD-10-CM

## 2018-11-12 DIAGNOSIS — B35.1 ONYCHOMYCOSIS: ICD-10-CM

## 2018-11-12 DIAGNOSIS — D13.1: ICD-10-CM

## 2018-11-12 DIAGNOSIS — E78.00 PURE HYPERCHOLESTEROLEMIA: ICD-10-CM

## 2018-11-12 DIAGNOSIS — R73.01 IFG (IMPAIRED FASTING GLUCOSE): ICD-10-CM

## 2018-11-12 DIAGNOSIS — I10 ESSENTIAL HYPERTENSION: ICD-10-CM

## 2018-11-12 PROBLEM — H25.11 AGE-RELATED NUCLEAR CATARACT OF RIGHT EYE: Status: RESOLVED | Noted: 2017-07-24 | Resolved: 2018-11-12

## 2018-11-12 PROBLEM — R42 POSTURAL DIZZINESS WITH PRESYNCOPE: Status: RESOLVED | Noted: 2017-10-25 | Resolved: 2018-11-12

## 2018-11-12 PROBLEM — H25.10 NUCLEAR SCLEROSIS: Status: RESOLVED | Noted: 2017-08-14 | Resolved: 2018-11-12

## 2018-11-12 PROBLEM — R55 POSTURAL DIZZINESS WITH PRESYNCOPE: Status: RESOLVED | Noted: 2017-10-25 | Resolved: 2018-11-12

## 2018-11-12 PROCEDURE — 99396 PREV VISIT EST AGE 40-64: CPT | Performed by: INTERNAL MEDICINE

## 2018-11-12 NOTE — ASSESSMENT & PLAN NOTE
Patient is taking triamterene-HCTZ 37.5-25 mg daily and Diovan 160 mg daily.  He denies side effects from taking his antihypertensive medications.  His blood pressure is stable and well controlled.  Patient has slightly low potassium at 3.5 and his kidney function was normal on 5/8/2018.  I discussed with patient to eat potassium rich diet and to keep well-hydrated.  I ordered blood tests for him to repeat it before today's visit however patient has not done yet.  He wanted to postpone the blood test to next 6 months before his appointment.

## 2018-11-12 NOTE — ASSESSMENT & PLAN NOTE
Patient has elevated fasting blood sugar at 113 and A1c at 6 on 5/8/2018.  He reported that he likes to eat starchy diet and does not have regular physical exercise.  He will try to control with diet and try regular physical exercise.  He denied polyuria or polydipsia.

## 2018-11-12 NOTE — PROGRESS NOTES
Chief Complaint:     Purvi Jaime is a 63 y.o. male who presents for a general exam    Last colonoscopy: 11/10/14  Last Td: 9/15/14  Hx STDs: no  Regular exercise: no     Routine general medical examination at health care facility  Patient is doing well.  He does not have any acute complaints today.  He states that his blurry vision resolved and he was evaluated and treated by ophthalmologist already.  He has negative ESR and CRP on 10/19/18 and that ruled out giant cell arteritis.  Patient is due for blood tests for follow-up.  He requested to postpone his blood test to next year and he will do it before 6-month follow-up.    Seasonal allergic rhinitis due to pollen  Patient is taking over-the-counter Allegra and Flonase nasal spray as needed.  His allergy is stable and well controlled.  He has mild postnasal drip and sometime trigger dry cough from postnasal drip.  His symptom is well controlled with current regimens.    Pure hypercholesterolemia  He is taking simvastatin 20 mg every evening without side effects.  His LDL at goal.  I discussed his previous blood test done on 5/8/2018 with him in clinic today.  He has elevated triglyceride.  Patient is advised to add on omega-3 on his regimen.    Results for PURVI JAIME (MRN [id number]) as of 11/12/2018 13:11   Ref. Range 5/8/2018 08:13   Cholesterol,Tot Latest Ref Range: 100 - 199 mg/dL 142   Triglycerides Latest Ref Range: 0 - 149 mg/dL 171 (H)   HDL Latest Ref Range: >=40 mg/dL 41   LDL Latest Ref Range: <100 mg/dL 67       Onychomycosis  Patient reported that his both great toenails are still having fungal infection.  He did not want to continue using ciclopirox gel as he did not feel any improvement and he always forgot to apply medication as well.  He wants to try nonprescription conservative treatment with home remedy or over-the-counter medication.    IFG (impaired fasting glucose)  Patient has elevated fasting blood sugar at 113 and A1c at 6  on 5/8/2018.  He reported that he likes to eat starchy diet and does not have regular physical exercise.  He will try to control with diet and try regular physical exercise.  He denied polyuria or polydipsia.    Gastroesophageal reflux disease without esophagitis  Patient is taking pantoprazole 40 mg daily as prescribed by [facility].  He denies side effects from taking pantoprazole.  His acid reflux is stable and well controlled.  He has EGD with [facility] on 5/3/2017 showed that he has my inflammation in his stomach and hiatal hernia.    Essential hypertension  Patient is taking triamterene-HCTZ 37.5-25 mg daily and Diovan 160 mg daily.  He denies side effects from taking his antihypertensive medications.  His blood pressure is stable and well controlled.  Patient has slightly low potassium at 3.5 and his kidney function was normal on 5/8/2018.  I discussed with patient to eat potassium rich diet and to keep well-hydrated.  I ordered blood tests for him to repeat it before today's visit however patient has not done yet.  He wanted to postpone the blood test to next 6 months before his appointment.    Blurred vision, bilateral  Patient was seen by Dr. Aguirre on 10/19/2018 for blurry vision.  He states that he was seen by ophthalmologist at [facility] and was treated with laser on both eyes on 11/7/18 and 11/8/18.  He stated that his blurry vision resolved in his vision was back to baseline.  He denied pain in his eye or denied blurry or discharge from the eye.  He has normal CRP and ESR on 10/19/18 that rule out giant cell arteritis.  I reviewed blood test result with patient in clinic today.  Patient requested to print out his blood test results for him.    Benign tumor of cardia of stomach  Patient follows with [facility] regularly and he is taking pantoprazole 40 mg daily as instructed by [facility].  He has EGD done with [facility] on 5/3/17 and showed that he has my  inflammation in stomach and has hiatal hernia.  Patient is advised to follow-up with digestive health regularly.  He is asymptomatic currently.        He  has a past medical history of GERD (gastroesophageal reflux disease); Hyperlipidemia; and Hypertension.  He  has a past surgical history that includes pr upper gi endoscopy,exam; egd w/endoscopic ultrasound (2/21/2013); gastroscopy with biopsy (2/21/2013); gastroscopy with biopsy (1/29/2014); cataract phaco with iol (Right, 7/24/2017); and cataract phaco with iol (Left, 8/14/2017).  Family History   Problem Relation Age of Onset   • Genetic Mother 75        GERD   • Diabetes Father    • Hypertension Brother    • Diabetes Brother      Social History   Substance Use Topics   • Smoking status: Never Smoker   • Smokeless tobacco: Never Used   • Alcohol use Yes      Comment: occas       Current Outpatient Prescriptions   Medication Sig Dispense Refill   • valsartan (DIOVAN) 160 MG Tab Take 1 Tab by mouth every day. 90 Tab 3   • simvastatin (ZOCOR) 20 MG Tab TAKE 1 TAB BY MOUTH EVERY EVENING. 30 Tab 11   • triamterene/hctz (MAXZIDE-25/DYAZIDE) 37.5-25 MG Cap TAKE 1 CAPSULE BY MOUTH EVERY DAY. 30 Cap 11   • fluticasone (FLONASE) 50 MCG/ACT nasal spray Spray 1 Spray in nose every day. Each Nostril 1 Bottle 3   • Ciclopirox 0.77 % Gel Apply once a day for great toes and fifth toes nails on both feet. 1 Tube 3   • fexofenadine (ALLEGRA ALLERGY) 180 MG tablet Take 180 mg by mouth every day.     • pantoprazole (PROTONIX) 40 MG TBEC Take 40 mg by mouth every day.       No current facility-administered medications for this visit.     (including changes today)  Allergies: Padmini-seltzer [aspirin effervescent] and Aspirin    Review Of Systems  Denies fever, chills, or sweats  Skin: negative for rash, scaling, itching, pigmentation, hair or nail changes.  Eyes: negative for visual blurring, double vision, eye pain, floaters and discharge from eyes  Ears/Nose/Throat: negative for  "tinnitus, vertigo, bleeding gums, dental problem and hoarseness, frequent URI's, sinus trouble, persistent sore throat  Respiratory: negative for persistent cough, hemoptysis, dyspnea, recurrent pneumonia or bronchitis, asthma and wheezing  Cardiovascular: negative for palpitations, tachycardia, irregular heart beat, chest pain, or peripheral edema.  Gastrointestinal: negative for poor appetite, dysphagia, nausea, heartburn or reflux, abdominal pain, hemorrhoids, constipation or diarrhea  Genitourinary: negative for nocturia, dysuria, frequency, hesitancy, incontinence, sexually transmitted disease, abnormal penile discharge, or ED.  Musculoskeletal: negative for joint swelling and muscle pain/ soreness  Neurologic: negative for migraine headaches, involuntary movements or tremor  Psychiatric: negative for excessive alcohol consumption or illegal drug usage, sleep disturbance, anxiety, depression, sexual difficulties  Hematologic/Lymphatic/Immunologic: negative for anemia, unusual bruising, swollen glands, HIV risk factors  Endocrine: negative for temperature intolerance, polydipsia, polyuria, unintentional weight changes.       PHYSICAL EXAMINATION:  Blood pressure 122/80, pulse 73, temperature 36.4 °C (97.5 °F), temperature source Temporal, height 1.753 m (5' 9\"), weight 86.8 kg (191 lb 6.4 oz), SpO2 97 %.  Body mass index is 28.26 kg/m².  Wt Readings from Last 4 Encounters:   11/12/18 86.8 kg (191 lb 6.4 oz)   10/19/18 86.2 kg (190 lb)   05/15/18 85.3 kg (188 lb)   02/12/18 85 kg (187 lb 6.4 oz)       Constitutional: Alert, no distress.  Skin: Warm, dry, good turgor, no rashes in visible areas.  Eye: Equal, round and reactive, conjunctiva clear, lids normal.  ENMT: Lips without lesions, good dentition, oropharynx clear.  Neck: Trachea midline, no masses, no thyromegaly.  Respiratory: Unlabored respiratory effort, lungs clear to auscultation, no wheezes, no ronchi.  Cardiovascular: Normal S1, S2, no murmur, no " edema.  Abdomen: Soft, non-tender, no masses, no hepatosplenomegaly.  Psych: Alert and oriented x3, normal affect and mood.  Genitalia: Patient declined to exam.  Rectal: deferred  Prostate: Deferred    ASSESSMENT/PLAN:  1. Routine general medical examination at health care facility      Counseling for healthy diet and regular physical exercise.   2. Seasonal allergic rhinitis due to pollen      Well-controlled.  Continue Allegra as needed and Flonase nasal spray as needed.  Advised to rinse sinus with normal saline once or twice a day as needed   3. Pure hypercholesterolemia      Well-controlled.  Continue simvastatin 20 mg every evening.  Reviewed potential side effects of simvastatin with patient.  Recheck lab in 6 months.   4. Onychomycosis      Chronic and stable.  He wants to try over-the-counter medication or home remedy.   5. IFG (impaired fasting glucose)      Not well controlled yet.  Advised to avoid processed sugar and simple carbohydrate.  Recommend to do regular physical exercise.  Recheck lab in 6 months.   6. Gastroesophageal reflux disease without esophagitis      Stable.  Continue pantoprazole 40 mg daily as prescribed and managed by digestive health.  Patient is advised to avoid acidic food.   7. Essential hypertension      Well-controlled. Continue Diovan 160 mg daily and triamterene-HCTZ 37.5-25 mg daily. Recommend to monitor blood pressure and heart rate at home.   8. Blurred vision, bilateral      Resolved.  Patient was treated by ophthalmologist with laser treatment on both eyes on 11/7/18 and 11/8/18.   9. Benign tumor of cardia of stomach      Chronic and stable.  Patient is asymptomatic.  Patient is advised to follow-up with digestive health as scheduled.     Labs per orders  Counseling about diet, exercise, skin care  Vaccinations per orders  HM: Patient already received flu vaccine at [facility] pharmacy on 9/8/2018.  Next office visit for recheck of chronic medical conditions is due in 6  months.

## 2018-11-12 NOTE — ASSESSMENT & PLAN NOTE
Patient is taking pantoprazole 40 mg daily as prescribed by digestive [facility].  He denies side effects from taking pantoprazole.  His acid reflux is stable and well controlled.  He has EGD with [facility] on 5/3/2017 showed that he has my inflammation in his stomach and hiatal hernia.

## 2018-11-12 NOTE — ASSESSMENT & PLAN NOTE
Patient was seen by Dr. Aguirre on 10/19/2018 for blurry vision.  He states that he was seen by ophthalmologist at [facility] and was treated with laser on both eyes on 11/7/18 and 11/8/18.  He stated that his blurry vision resolved in his vision was back to baseline.  He denied pain in his eye or denied blurry or discharge from the eye.  He has normal CRP and ESR on 10/19/18 that rule out giant cell arteritis.  I reviewed blood test result with patient in clinic today.  Patient requested to print out his blood test results for him.

## 2018-11-12 NOTE — ASSESSMENT & PLAN NOTE
Patient is doing well.  He does not have any acute complaints today.  He states that his blurry vision resolved and he was evaluated and treated by ophthalmologist already.  He has negative ESR and CRP on 10/19/18 and that ruled out giant cell arteritis.  Patient is due for blood tests for follow-up.  He requested to postpone his blood test to next year and he will do it before 6-month follow-up.

## 2018-11-12 NOTE — ASSESSMENT & PLAN NOTE
He is taking simvastatin 20 mg every evening without side effects.  His LDL at goal.  I discussed his previous blood test done on 5/8/2018 with him in clinic today.  He has elevated triglyceride.  Patient is advised to add on omega-3 on his regimen.    Results for PURVI GRACE (MRN [id number]) as of 11/12/2018 13:11   Ref. Range 5/8/2018 08:13   Cholesterol,Tot Latest Ref Range: 100 - 199 mg/dL 142   Triglycerides Latest Ref Range: 0 - 149 mg/dL 171 (H)   HDL Latest Ref Range: >=40 mg/dL 41   LDL Latest Ref Range: <100 mg/dL 67

## 2018-11-12 NOTE — ASSESSMENT & PLAN NOTE
Patient reported that his both great toenails are still having fungal infection.  He did not want to continue using ciclopirox gel as he did not feel any improvement and he always forgot to apply medication as well.  He wants to try nonprescription conservative treatment with home remedy or over-the-counter medication.

## 2018-11-12 NOTE — ASSESSMENT & PLAN NOTE
Patient is taking over-the-counter Allegra and Flonase nasal spray as needed.  His allergy is stable and well controlled.  He has mild postnasal drip and sometime trigger dry cough from postnasal drip.  His symptom is well controlled with current regimens.

## 2018-11-12 NOTE — ASSESSMENT & PLAN NOTE
Patient follows with digestive health regularly and he is taking pantoprazole 40 mg daily as instructed by digestive [facility].  He has EGD done with digestive [facility] on 5/3/17 and showed that he has my inflammation in stomach and has hiatal hernia.  Patient is advised to follow-up with digestive health regularly.  He is asymptomatic currently.

## 2018-11-13 ENCOUNTER — TELEPHONE (OUTPATIENT)
Dept: MEDICAL GROUP | Age: 63
End: 2018-11-13

## 2018-11-13 DIAGNOSIS — I10 ESSENTIAL HYPERTENSION: ICD-10-CM

## 2018-11-13 RX ORDER — LOSARTAN POTASSIUM 50 MG/1
50 TABLET ORAL DAILY
Qty: 90 TAB | Refills: 3 | Status: SHIPPED | OUTPATIENT
Start: 2018-11-13 | End: 2019-11-25 | Stop reason: SDUPTHER

## 2018-11-13 NOTE — TELEPHONE ENCOUNTER
Patient stopped by office stating that valsartan is still on recall and they need an alternative medication      Please advise.

## 2018-11-14 NOTE — TELEPHONE ENCOUNTER
Please call to inform patient that I discontinued valsartan 160 mg and please advised him to stop taking valsartan.  I prescribed losartan 50 mg to take 1 tablet daily to replace valsartan.  He can continue triamterene-hydrochlorothiazide 37.5-25 mg daily.    Eloisa Bolden M.D.

## 2018-11-14 NOTE — TELEPHONE ENCOUNTER
Phone Number Called: [phone] (home)       Message: LVM for patient to call us back regarding message below.     Left Message for patient to call back: yes

## 2018-11-15 NOTE — TELEPHONE ENCOUNTER
Phone Number Called: [phone] (home)       Message: I left a voicemail for patient to call back to inform them of Dr. Bolden's message below.      Left Message for patient to call back: yes

## 2018-11-19 NOTE — TELEPHONE ENCOUNTER
Phone Number Called: [phone] (home)       Message: I left a voicemail for patient to call back to inform them of message below.Letter sent    Left Message for patient to call back: yes and no

## 2018-11-27 ENCOUNTER — TELEPHONE (OUTPATIENT)
Dept: MEDICAL GROUP | Age: 63
End: 2018-11-27

## 2018-11-27 NOTE — TELEPHONE ENCOUNTER
1. Caller Name: Reyna Pts wife                                          Call Back Number: [phone]      Patient approves a detailed voicemail message: yes    Pts wife came in stated that new medication losartan is giving bad effects hes feeling really tense a lot anxiety and cannot sleep they are wondering if he can take triamterene-hctz instead or what is recommended

## 2018-11-27 NOTE — TELEPHONE ENCOUNTER
Please inform patient and wife that he can continue to take triamterene-HCTZ 37.5-25 mg once a day.  He was taking valsartan (Diovan) 160 mg in the past with triamterene-HCTZ together.  Since Diovan is in the recall list, we switched Diovan to losartan 50 mg daily.    If his blood pressure is less than 130/80 by taking triamterene-HCTZ alone, he does not require to take losartan.  If his blood pressure is above 130/80 with triamterene-hydrochlorothiazide, he will need to take additional blood pressure medication such as losartan.  He can try to cut losartan to half tablet which will be 25 mg.  He can take losartan 25 mg daily.  Losartan did not cause anxiety.  He can try taking losartan in the early afternoon.  If he still does not like to take losartan, he can switch losartan to lisinopril.    Eloisa Bolden M.D.

## 2018-11-28 NOTE — TELEPHONE ENCOUNTER
Phone Number Called:[phone] (home)         Message: I left a voicemail for patient to call back to inform them of message below.      Left Message for patient to call back: no

## 2018-11-28 NOTE — TELEPHONE ENCOUNTER
Phone Number Called: [phone] (home)       Message: I left a voicemail for patient to call back to inform them of message below.      Left Message for patient to call back: yes

## 2018-12-23 ENCOUNTER — HOSPITAL ENCOUNTER (EMERGENCY)
Facility: MEDICAL CENTER | Age: 63
End: 2018-12-23
Attending: EMERGENCY MEDICINE
Payer: COMMERCIAL

## 2018-12-23 VITALS
BODY MASS INDEX: 27.76 KG/M2 | HEART RATE: 55 BPM | RESPIRATION RATE: 16 BRPM | OXYGEN SATURATION: 95 % | TEMPERATURE: 97.8 F | DIASTOLIC BLOOD PRESSURE: 79 MMHG | HEIGHT: 69 IN | WEIGHT: 187.39 LBS | SYSTOLIC BLOOD PRESSURE: 136 MMHG

## 2018-12-23 DIAGNOSIS — R00.2 PALPITATIONS: ICD-10-CM

## 2018-12-23 LAB
ALBUMIN SERPL BCP-MCNC: 3.5 G/DL (ref 3.2–4.9)
ALBUMIN/GLOB SERPL: 1.2 G/DL
ALP SERPL-CCNC: 90 U/L (ref 30–99)
ALT SERPL-CCNC: 21 U/L (ref 2–50)
ANION GAP SERPL CALC-SCNC: 7 MMOL/L (ref 0–11.9)
AST SERPL-CCNC: 22 U/L (ref 12–45)
BASOPHILS # BLD AUTO: 0.5 % (ref 0–1.8)
BASOPHILS # BLD: 0.03 K/UL (ref 0–0.12)
BILIRUB SERPL-MCNC: 1 MG/DL (ref 0.1–1.5)
BUN SERPL-MCNC: 16 MG/DL (ref 8–22)
CALCIUM SERPL-MCNC: 8.3 MG/DL (ref 8.4–10.2)
CHLORIDE SERPL-SCNC: 105 MMOL/L (ref 96–112)
CO2 SERPL-SCNC: 26 MMOL/L (ref 20–33)
CREAT SERPL-MCNC: 0.98 MG/DL (ref 0.5–1.4)
EKG IMPRESSION: NORMAL
EOSINOPHIL # BLD AUTO: 0.24 K/UL (ref 0–0.51)
EOSINOPHIL NFR BLD: 4.4 % (ref 0–6.9)
ERYTHROCYTE [DISTWIDTH] IN BLOOD BY AUTOMATED COUNT: 42.5 FL (ref 35.9–50)
GLOBULIN SER CALC-MCNC: 3 G/DL (ref 1.9–3.5)
GLUCOSE SERPL-MCNC: 118 MG/DL (ref 65–99)
HCT VFR BLD AUTO: 48.4 % (ref 42–52)
HGB BLD-MCNC: 16.8 G/DL (ref 14–18)
IMM GRANULOCYTES # BLD AUTO: 0 K/UL (ref 0–0.11)
IMM GRANULOCYTES NFR BLD AUTO: 0 % (ref 0–0.9)
LYMPHOCYTES # BLD AUTO: 2.22 K/UL (ref 1–4.8)
LYMPHOCYTES NFR BLD: 40.4 % (ref 22–41)
MCH RBC QN AUTO: 30.4 PG (ref 27–33)
MCHC RBC AUTO-ENTMCNC: 34.7 G/DL (ref 33.7–35.3)
MCV RBC AUTO: 87.5 FL (ref 81.4–97.8)
MONOCYTES # BLD AUTO: 0.42 K/UL (ref 0–0.85)
MONOCYTES NFR BLD AUTO: 7.6 % (ref 0–13.4)
NEUTROPHILS # BLD AUTO: 2.59 K/UL (ref 1.82–7.42)
NEUTROPHILS NFR BLD: 47.1 % (ref 44–72)
NRBC # BLD AUTO: 0 K/UL
NRBC BLD-RTO: 0 /100 WBC
PLATELET # BLD AUTO: 178 K/UL (ref 164–446)
PMV BLD AUTO: 10.3 FL (ref 9–12.9)
POTASSIUM SERPL-SCNC: 3.3 MMOL/L (ref 3.6–5.5)
PROT SERPL-MCNC: 6.5 G/DL (ref 6–8.2)
RBC # BLD AUTO: 5.53 M/UL (ref 4.7–6.1)
SODIUM SERPL-SCNC: 138 MMOL/L (ref 135–145)
TSH SERPL DL<=0.005 MIU/L-ACNC: 1.16 UIU/ML (ref 0.38–5.33)
WBC # BLD AUTO: 5.5 K/UL (ref 4.8–10.8)

## 2018-12-23 PROCEDURE — 84443 ASSAY THYROID STIM HORMONE: CPT

## 2018-12-23 PROCEDURE — 36415 COLL VENOUS BLD VENIPUNCTURE: CPT

## 2018-12-23 PROCEDURE — 80053 COMPREHEN METABOLIC PANEL: CPT

## 2018-12-23 PROCEDURE — 85025 COMPLETE CBC W/AUTO DIFF WBC: CPT

## 2018-12-23 PROCEDURE — 99284 EMERGENCY DEPT VISIT MOD MDM: CPT

## 2018-12-23 PROCEDURE — 93005 ELECTROCARDIOGRAM TRACING: CPT

## 2018-12-23 ASSESSMENT — PAIN SCALES - GENERAL: PAINLEVEL_OUTOF10: 0

## 2018-12-23 NOTE — ED NOTES
Med rec updated and complete  Allergies reviewed  Interviewed pt with wife at bedside with permission from pt.  Pts wife had RX bottles at bedside, went over RX bottles and returned RX bottles back to pts wife.  Pts wife reports that pt stopped his VALSARTAN 160MG about a month ago, and stopped his LOSARTAN 50MG about 2 weeks ago.  Pts wife reports that pt took his TRIAMTERENE/HCTZ 37.5-25MG two times only yesterday (12/22/2018).  Pt reports no antibiotics in the last 30 days.

## 2018-12-23 NOTE — ED PROVIDER NOTES
ED Provider Note    CHIEF COMPLAINT  Chief Complaint   Patient presents with   • Rapid Heart Beat       HPI  Karsten Jaime is a 63 y.o. male who presents for evaluation of nocturnal palpitations.  The patient has a history of hypertension.  He has no known history of arrhythmia.  He reports that at night he has difficulty sleeping feels intermittent episodes of a rapid pulse.  He specifically denies chest pain he has no strokelike symptoms such as speech abnormalities numbness weakness or tingling to the arms legs or face.  Patient has no known history of heart disease.  No report of thyroid disease.  He specifically denies any illicit drug use cocaine and amphetamines.  He does not drink alcohol excessively.  No other symptoms reported    REVIEW OF SYSTEMS  See HPI for further details.  No night sweats weight loss numbness tingling weakness all other systems are negative.     PAST MEDICAL HISTORY  Past Medical History:   Diagnosis Date   • GERD (gastroesophageal reflux disease)    • Hyperlipidemia    • Hypertension        FAMILY HISTORY  Noncontributory    SOCIAL HISTORY  Social History     Social History   • Marital status:      Spouse name: N/A   • Number of children: N/A   • Years of education: N/A     Social History Main Topics   • Smoking status: Never Smoker   • Smokeless tobacco: Never Used   • Alcohol use Yes      Comment: Occasionally   • Drug use: No   • Sexual activity: Yes     Partners: Female     Other Topics Concern   • Not on file     Social History Narrative   • No narrative on file       SURGICAL HISTORY  Past Surgical History:   Procedure Laterality Date   • CATARACT PHACO WITH IOL Left 8/14/2017    Procedure: CATARACT PHACO WITH IOL;  Surgeon: Dwayne Ching M.D.;  Location: SURGERY SAME DAY [facility];  Service:    • CATARACT PHACO WITH IOL Right 7/24/2017    Procedure: CATARACT PHACO WITH IOL KPE WITH PLANO SUPERIOR;  Surgeon: Dwayne Ching M.D.;  Location: SURGERY SAME  "DAY [facility];  Service:    • GASTROSCOPY WITH BIOPSY  1/29/2014    Performed by Nick Salas M.D. at SURGERY [facility]S   • EGD W/ENDOSCOPIC ULTRASOUND  2/21/2013    Performed by Nick Salas M.D. at [facility] ORS   • GASTROSCOPY WITH BIOPSY  2/21/2013    Performed by Nick Salas M.D. at [facility] ORS   • MN UPPER GI ENDOSCOPY,EXAM         CURRENT MEDICATIONS  Home Medications    **Home medications have not yet been reviewed for this encounter**     No current facility-administered medications for this encounter.     Current Outpatient Prescriptions:   •  losartan (COZAAR) 50 MG Tab, Take 1 Tab by mouth every day., Disp: 90 Tab, Rfl: 3  •  simvastatin (ZOCOR) 20 MG Tab, TAKE 1 TAB BY MOUTH EVERY EVENING., Disp: 30 Tab, Rfl: 11  •  triamterene/hctz (MAXZIDE-25/DYAZIDE) 37.5-25 MG Cap, TAKE 1 CAPSULE BY MOUTH EVERY DAY., Disp: 30 Cap, Rfl: 11  •  fluticasone (FLONASE) 50 MCG/ACT nasal spray, Spray 1 Spray in nose every day. Each Nostril, Disp: 1 Bottle, Rfl: 3  •  Ciclopirox 0.77 % Gel, Apply once a day for great toes and fifth toes nails on both feet., Disp: 1 Tube, Rfl: 3  •  fexofenadine (ALLEGRA ALLERGY) 180 MG tablet, Take 180 mg by mouth every day., Disp: , Rfl:   •  pantoprazole (PROTONIX) 40 MG TBEC, Take 40 mg by mouth every day., Disp: , Rfl:       ALLERGIES  Allergies   Allergen Reactions   • Padmini-[place] [Aspirin Effervescent]      'can not breath to good and sneezing'   • Aspirin      'can not breathe to good and sneezing'       PHYSICAL EXAM  VITAL SIGNS: /79   Pulse 61   Temp 36.6 °C (97.8 °F) (Temporal)   Resp 18   Ht 1.753 m (5' 9\")   Wt 85 kg (187 lb 6.3 oz)   SpO2 99%   BMI 27.67 kg/m²       Constitutional: Well developed, Well nourished, No acute distress, Non-toxic appearance.   HENT: Normocephalic, Atraumatic, Bilateral external ears normal, Oropharynx moist, No oral exudates, Nose normal.   Eyes: PERRLA, EOMI, Conjunctiva normal, No " discharge.   Neck: Normal range of motion, No tenderness, Supple, No stridor.   Cardiovascular: Normal heart rate, Normal rhythm, No murmurs, No rubs, No gallops.   Thorax & Lungs: Normal breath sounds, No respiratory distress, No wheezing, No chest tenderness.   Abdomen: Bowel sounds normal, Soft, No tenderness, No masses, No pulsatile masses.   Skin: Warm, Dry, No erythema, No rash.   Back: No tenderness, No CVA tenderness.   Extremities: Intact distal pulses, No edema, No tenderness, No cyanosis, No clubbing.   Musculoskeletal: Good range of motion in all major joints. No tenderness to palpation or major deformities noted.   Neurologic: Alert & oriented x 3, Normal motor function, Normal sensory function, No focal deficits noted.   Psychiatric:  anxious    EKG  Interpretation by me rate 65 sinus rhythm no acute ST segment elevation or depression no pathological T wave inversions.  Intervals and axis are normal no suggestion of ischemia or arrhythmia    RADIOLOGY/PROCEDURES  Results for orders placed or performed during the hospital encounter of 12/23/18   TSH   Result Value Ref Range    TSH 1.160 0.380 - 5.330 uIU/mL   CBC WITH DIFFERENTIAL   Result Value Ref Range    WBC 5.5 4.8 - 10.8 K/uL    RBC 5.53 4.70 - 6.10 M/uL    Hemoglobin 16.8 14.0 - 18.0 g/dL    Hematocrit 48.4 42.0 - 52.0 %    MCV 87.5 81.4 - 97.8 fL    MCH 30.4 27.0 - 33.0 pg    MCHC 34.7 33.7 - 35.3 g/dL    RDW 42.5 35.9 - 50.0 fL    Platelet Count 178 164 - 446 K/uL    MPV 10.3 9.0 - 12.9 fL    Neutrophils-Polys 47.10 44.00 - 72.00 %    Lymphocytes 40.40 22.00 - 41.00 %    Monocytes 7.60 0.00 - 13.40 %    Eosinophils 4.40 0.00 - 6.90 %    Basophils 0.50 0.00 - 1.80 %    Immature Granulocytes 0.00 0.00 - 0.90 %    Nucleated RBC 0.00 /100 WBC    Neutrophils (Absolute) 2.59 1.82 - 7.42 K/uL    Lymphs (Absolute) 2.22 1.00 - 4.80 K/uL    Monos (Absolute) 0.42 0.00 - 0.85 K/uL    Eos (Absolute) 0.24 0.00 - 0.51 K/uL    Baso (Absolute) 0.03 0.00 - 0.12  K/uL    Immature Granulocytes (abs) 0.00 0.00 - 0.11 K/uL    NRBC (Absolute) 0.00 K/uL   COMP METABOLIC PANEL   Result Value Ref Range    Sodium 138 135 - 145 mmol/L    Potassium 3.3 (L) 3.6 - 5.5 mmol/L    Chloride 105 96 - 112 mmol/L    Co2 26 20 - 33 mmol/L    Anion Gap 7.0 0.0 - 11.9    Glucose 118 (H) 65 - 99 mg/dL    Bun 16 8 - 22 mg/dL    Creatinine 0.98 0.50 - 1.40 mg/dL    Calcium 8.3 (L) 8.4 - 10.2 mg/dL    AST(SGOT) 22 12 - 45 U/L    ALT(SGPT) 21 2 - 50 U/L    Alkaline Phosphatase 90 30 - 99 U/L    Total Bilirubin 1.0 0.1 - 1.5 mg/dL    Albumin 3.5 3.2 - 4.9 g/dL    Total Protein 6.5 6.0 - 8.2 g/dL    Globulin 3.0 1.9 - 3.5 g/dL    A-G Ratio 1.2 g/dL   ESTIMATED GFR   Result Value Ref Range    GFR If African American >60 >60 mL/min/1.73 m 2    GFR If Non African American >60 >60 mL/min/1.73 m 2   EKG   Result Value Ref Range    Report       [facility] Emergency Dept.    Test Date:  2018  Pt Name:    PURVI GRACE          Department: [facility]  MRN:        [id number]                      [facility]  Gender:     Male                         Technician: DANIEL  :        1955                   Requested By:ER TRIAGE PROTOCOL  Order #:    [id number]                    Reading MD:    Measurements  Intervals                                Axis  Rate:       65                           P:          -12  ND:         176                          QRS:        26  QRSD:       94                           T:          9  QT:         424  QTc:        441    Interpretive Statements  SINUS RHYTHM  BASELINE WANDER IN LEAD(S) V6  Compared to ECG 2014 09:41:41  Sinus bradycardia no longer present  Atrial premature complex(es) no longer present          COURSE & MEDICAL DECISION MAKING  Pertinent Labs & Imaging studies reviewed. (See chart for details)  Patient was observed on telemetry for around 2 hours.  He did not have any evidence of any tacky dysrhythmia PACs PVCs.  His  symptoms are most consistent with anxiety.  He does report that symptoms only affect him at night when he is sleeping.  He may ultimately have some palpitations at night his thyroid studies and EKG are normal here.  I will refer him to cardiology for Holter monitoring testing if needed    FINAL IMPRESSION  1.  Palpitations  2.  Anxiety         Electronically signed by: Tino Schwartz, 12/23/2018 7:12 AM

## 2018-12-23 NOTE — ED NOTES
Pt presents complaining of  Tachycardia recurring for the past 2 weeks.  He denies any associated dyspnea, or chest pain.  His spouse states he has a hx of anxiety, without any similar past occurrences.

## 2019-01-03 ENCOUNTER — APPOINTMENT (OUTPATIENT)
Dept: MEDICAL GROUP | Age: 64
End: 2019-01-03
Payer: COMMERCIAL

## 2019-01-07 ENCOUNTER — OFFICE VISIT (OUTPATIENT)
Dept: MEDICAL GROUP | Age: 64
End: 2019-01-07
Payer: COMMERCIAL

## 2019-01-07 ENCOUNTER — TELEPHONE (OUTPATIENT)
Dept: MEDICAL GROUP | Age: 64
End: 2019-01-07

## 2019-01-07 VITALS
HEART RATE: 76 BPM | HEIGHT: 69 IN | SYSTOLIC BLOOD PRESSURE: 118 MMHG | OXYGEN SATURATION: 96 % | WEIGHT: 184.7 LBS | BODY MASS INDEX: 27.36 KG/M2 | DIASTOLIC BLOOD PRESSURE: 68 MMHG | TEMPERATURE: 97.4 F

## 2019-01-07 DIAGNOSIS — K21.9 GASTROESOPHAGEAL REFLUX DISEASE WITHOUT ESOPHAGITIS: ICD-10-CM

## 2019-01-07 DIAGNOSIS — I10 ESSENTIAL HYPERTENSION: ICD-10-CM

## 2019-01-07 DIAGNOSIS — E83.51 HYPOCALCEMIA: ICD-10-CM

## 2019-01-07 DIAGNOSIS — E87.6 HYPOKALEMIA: ICD-10-CM

## 2019-01-07 DIAGNOSIS — Z09 HOSPITAL DISCHARGE FOLLOW-UP: ICD-10-CM

## 2019-01-07 DIAGNOSIS — E78.00 PURE HYPERCHOLESTEROLEMIA: ICD-10-CM

## 2019-01-07 PROCEDURE — 99214 OFFICE O/P EST MOD 30 MIN: CPT | Performed by: INTERNAL MEDICINE

## 2019-01-07 RX ORDER — POTASSIUM CHLORIDE 20 MEQ/1
20 TABLET, EXTENDED RELEASE ORAL 2 TIMES DAILY
Qty: 60 TAB | Refills: 11 | Status: SHIPPED | OUTPATIENT
Start: 2019-01-07 | End: 2019-05-13 | Stop reason: SDUPTHER

## 2019-01-07 NOTE — PROGRESS NOTES
Subjective:   Karsten Jaime is a 63 y.o. male here today for evaluation and management of:      Hospital discharge follow-up  Patient was seen in ER on 12/23/18 for palpitation, shortness of breath every night after having nightmares.  He has symptoms only at night after bad dream.  He denies chest pain or shortness of breath or palpitation during the daytime.  His EKG in ER on 12/23/18 did not show any acute ischemic or arrhythmia.  He was discharged from ER in advised to follow-up in our clinic.  He has not returned back to work after he discharged from ER.  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  He wanted to have a doctor's note to go back to work as he does not have any symptoms during the daytime.      Gastroesophageal reflux disease without esophagitis  He also noticed having more acid reflux in the nighttime and causing irritation and waking him up from acid reflux.  He is currently taking pantoprazole 40 mg every morning.  Patient denied abdominal pain.  He denies drinking alcohol.  He follows with digestive health regularly and he has EGD on 5/3/17 showed mild inflammation in his stomach and hiatal hernia.  I discussed with patient to switch pantoprazole to evening time and advised him to eat dinner earlier.  Patient agreed to try it.      Essential hypertension  He is taking triamterene-hydrochlorothiazide 37.5-25 mg daily and losartan 50 mg daily.  He denies side effects from taking his medication.  His blood pressure is stable and well controlled.  He has low potassium level at 3.3 on 12/23/18. I will add on potassium supplement 20 mEq twice a day on his regimens.  Patient will repeat blood test in May 2019 before next follow-up visit.   I will also order cardiac stress test for further workup given his risk factor of hypertension, hypercholesterolemia and prediabetes.    Pure hypercholesterolemia  He is taking simvastatin 20 mg every evening.  He denies side effects from taking  "it.  His LDL was 67 on 5/8/18.  He has normal liver enzymes.         Current medicines (including changes today)  Current Outpatient Prescriptions   Medication Sig Dispense Refill   • potassium chloride SA (KDUR) 20 MEQ Tab CR Take 1 Tab by mouth 2 times a day. 60 Tab 11   • Omega-3 Fatty Acids (OMEGA-3 FISH OIL PO) Take 1 Cap by mouth every day.     • losartan (COZAAR) 50 MG Tab Take 1 Tab by mouth every day. 90 Tab 3   • simvastatin (ZOCOR) 20 MG Tab TAKE 1 TAB BY MOUTH EVERY EVENING. 30 Tab 11   • triamterene/hctz (MAXZIDE-25/DYAZIDE) 37.5-25 MG Cap TAKE 1 CAPSULE BY MOUTH EVERY DAY. 30 Cap 11   • pantoprazole (PROTONIX) 40 MG TBEC Take 40 mg by mouth every day.       No current facility-administered medications for this visit.      He  has a past medical history of GERD (gastroesophageal reflux disease); Hyperlipidemia; and Hypertension.    ROS   No chest pain, no shortness of breath, no abdominal pain       Objective:     Blood pressure 118/68, pulse 76, temperature 36.3 °C (97.4 °F), temperature source Temporal, height 1.753 m (5' 9\"), weight 83.8 kg (184 lb 11.2 oz), SpO2 96 %. Body mass index is 27.28 kg/m².   Physical Exam:  General: Alert, oriented and no acute distress.  Eye contact is good, speech goal directed, affect calm  HEENT: conjunctiva non-injected, sclera non-icteric.  Oral mucous membranes pink and moist with no lesions.  Pinna normal.   Lungs: Normal respiratory effort, clear to auscultation bilaterally with good excursion.  CV: regular rate and rhythm. No murmurs.   Abdomen: soft, non distended, nontender, Bowel sound normal.  Ext: no edema, color normal, vascularity normal, temperature normal        Assessment and Plan:   The following treatment plan was discussed     1. Hospital discharge follow-up  - I reviewed his blood test done in the ER on 12/23/18 with patient and wife.  EKG did not show any acute ischemia or acute coronary syndrome.  He is asymptomatic at day time.  Patient does not " want to take medication for anxiety.  He wants to continue to monitor.     2. Essential hypertension  - Stable.  Continue losartan 50 mg daily and triamterene-HCTZ 37.5-25 mg daily.  - Recommend to monitor blood pressure and heart rate at home.  - Ordered cardiac stress test for further evaluation.  - Cardiac Stress Test Treadmill Only    3. Gastroesophageal reflux disease without esophagitis  - Discussed to try to take pantoprazole 40 mg in the evening time before dinner.  Advised to eat dinner in early evening.  Advised to avoid spicy food, acidic food, alcohol, fatty meal and recommend to elevate the head of the bed at night.    4. Pure hypercholesterolemia  - Well-controlled.  Continue simvastatin 20 mg every evening.  Reviewed potential side effects of simvastatin with patient.  - Advised to eat low fat, low carbohydrate and high fiber diet as well as do cardio physical exercise regularly.  - Cardiac Stress Test Treadmill Only    5. Hypokalemia  - Prescribed potassium chloride 20 mEq to take 1 tablets twice a day.  Advised to keep well-hydrated.  - potassium chloride SA (KDUR) 20 MEQ Tab CR; Take 1 Tab by mouth 2 times a day.  Dispense: 60 Tab; Refill: 11    6. Hypocalcemia  - Advised to take calcium carbonate 500 mg twice daily.  Will recheck electrolytes in May 2019 before next follow-up visit.  - Patient is advised to return to clinic sooner if he has any recurrent symptoms.      - Discussed ED precautions with patient: seek emergency evaluation for symptoms including but not limited to: worsening symptoms or developing any new symptoms, crushing chest pain, chest pain associated with difficulty breathing, nausea, or sweats, heart rate irregular or too fast to count.   - Any change or worsening of signs or symptoms, patient encouraged to follow-up or report to emergency room for further evaluation. Patient understands and agrees      Followup: Return in about 4 months (around 5/13/2019), or if symptoms  worsen or fail to improve, for Hypertension, Hyperlipidemia, Impaired fasting glucose, GERD, Lab review.      Please note that this dictation was created using voice recognition software. I have made every reasonable attempt to correct obvious errors, but I expect that there may have unintended errors in text, spelling, punctuation, or grammar that I did not discover.

## 2019-01-07 NOTE — ASSESSMENT & PLAN NOTE
He is taking simvastatin 20 mg every evening.  He denies side effects from taking it.  His LDL was 67 on 5/8/18.  He has normal liver enzymes.

## 2019-01-07 NOTE — TELEPHONE ENCOUNTER
I tried to call patient, but he did not answer the phone.  Please call patient to inform him to take potassium chloride 20 mEq 1 tablet twice a day as his potassium level is low at 3.3 in recent blood test.  Please also advised patient to take calcium carbonate 500 mg twice a day as his calcium level is low at 8.3.  Please let him know that he can have palpitation if he has low or high potassium or calcium.  Please advise him to keep well-hydrated.    He has blood tests ordered in previous visit and please advise him to repeat blood test in May 2019 before his next follow-up visit on 5/13/2019.    Please advise him to return to clinic sooner if he has any recurrent symptoms.    Eloisa Bolden M.D.

## 2019-01-07 NOTE — ASSESSMENT & PLAN NOTE
He also noticed having more acid reflux in the nighttime and causing irritation and waking him up from acid reflux.  He is currently taking pantoprazole 40 mg every morning.  Patient denied abdominal pain.  He denies drinking alcohol.  He follows with digestive health regularly and he has EGD on 5/3/17 showed mild inflammation in his stomach and hiatal hernia.  I discussed with patient to switch pantoprazole to evening time and advised him to eat dinner earlier.  Patient agreed to try it.

## 2019-01-07 NOTE — LETTER
January 7, 2019       Patient: Karsten Jaime   YOB: 1955   Date of Visit: 1/7/2019         To Whom It May Concern:    It is my medical opinion that Karsten Jaime return to work on 1/8/19.    If you have any questions or concerns, please don't hesitate to call [phone]          Sincerely,          Eloisa Bolden M.D.  Electronically Signed

## 2019-01-07 NOTE — ASSESSMENT & PLAN NOTE
Patient was seen in ER on 12/23/18 for palpitation, shortness of breath every night after having nightmares.  He has symptoms only at night after bad dream.  He denies chest pain or shortness of breath or palpitation during the daytime.  His EKG in ER on 12/23/18 did not show any acute ischemic or arrhythmia.  He was discharged from ER in advised to follow-up in our clinic.  He has not returned back to work after he discharged from ER.  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  He wanted to have a doctor's note to go back to work as he does not have any symptoms during the daytime.

## 2019-01-07 NOTE — ASSESSMENT & PLAN NOTE
He is taking triamterene-hydrochlorothiazide 37.5-25 mg daily and losartan 50 mg daily.  He denies side effects from taking his medication.  His blood pressure is stable and well controlled.  He has low potassium level at 3.3 on 12/23/18. I will add on potassium supplement 20 mEq twice a day on his regimens.  Patient will repeat blood test in May 2019 before next follow-up visit.   I will also order cardiac stress test for further workup given his risk factor of hypertension, hypercholesterolemia and prediabetes.

## 2019-03-27 DIAGNOSIS — I10 ESSENTIAL HYPERTENSION: ICD-10-CM

## 2019-03-27 RX ORDER — TRIAMTERENE AND HYDROCHLOROTHIAZIDE 37.5; 25 MG/1; MG/1
CAPSULE ORAL
Qty: 90 CAP | Refills: 3 | Status: SHIPPED | OUTPATIENT
Start: 2019-03-27 | End: 2020-03-26

## 2019-04-03 DIAGNOSIS — E78.00 PURE HYPERCHOLESTEROLEMIA: ICD-10-CM

## 2019-04-03 RX ORDER — SIMVASTATIN 20 MG
TABLET ORAL
Qty: 90 TAB | Refills: 3 | Status: SHIPPED | OUTPATIENT
Start: 2019-04-03 | End: 2020-03-26

## 2019-05-06 ENCOUNTER — HOSPITAL ENCOUNTER (OUTPATIENT)
Dept: LAB | Facility: MEDICAL CENTER | Age: 64
End: 2019-05-06
Attending: INTERNAL MEDICINE
Payer: COMMERCIAL

## 2019-05-06 DIAGNOSIS — I10 ESSENTIAL HYPERTENSION: ICD-10-CM

## 2019-05-06 DIAGNOSIS — E78.00 PURE HYPERCHOLESTEROLEMIA: ICD-10-CM

## 2019-05-06 DIAGNOSIS — R73.01 IFG (IMPAIRED FASTING GLUCOSE): ICD-10-CM

## 2019-05-06 LAB
ALBUMIN SERPL BCP-MCNC: 3.7 G/DL (ref 3.2–4.9)
ALBUMIN/GLOB SERPL: 1.3 G/DL
ALP SERPL-CCNC: 103 U/L (ref 30–99)
ALT SERPL-CCNC: 15 U/L (ref 2–50)
ANION GAP SERPL CALC-SCNC: 8 MMOL/L (ref 0–11.9)
AST SERPL-CCNC: 16 U/L (ref 12–45)
BASOPHILS # BLD AUTO: 0.6 % (ref 0–1.8)
BASOPHILS # BLD: 0.03 K/UL (ref 0–0.12)
BILIRUB SERPL-MCNC: 0.9 MG/DL (ref 0.1–1.5)
BUN SERPL-MCNC: 19 MG/DL (ref 8–22)
CALCIUM SERPL-MCNC: 8.7 MG/DL (ref 8.5–10.5)
CHLORIDE SERPL-SCNC: 107 MMOL/L (ref 96–112)
CHOLEST SERPL-MCNC: 143 MG/DL (ref 100–199)
CO2 SERPL-SCNC: 26 MMOL/L (ref 20–33)
CREAT SERPL-MCNC: 1.05 MG/DL (ref 0.5–1.4)
EOSINOPHIL # BLD AUTO: 0.3 K/UL (ref 0–0.51)
EOSINOPHIL NFR BLD: 5.7 % (ref 0–6.9)
ERYTHROCYTE [DISTWIDTH] IN BLOOD BY AUTOMATED COUNT: 45.7 FL (ref 35.9–50)
EST. AVERAGE GLUCOSE BLD GHB EST-MCNC: 117 MG/DL
FASTING STATUS PATIENT QL REPORTED: NORMAL
GLOBULIN SER CALC-MCNC: 2.8 G/DL (ref 1.9–3.5)
GLUCOSE SERPL-MCNC: 113 MG/DL (ref 65–99)
HBA1C MFR BLD: 5.7 % (ref 0–5.6)
HCT VFR BLD AUTO: 52.3 % (ref 42–52)
HDLC SERPL-MCNC: 47 MG/DL
HGB BLD-MCNC: 17.2 G/DL (ref 14–18)
IMM GRANULOCYTES # BLD AUTO: 0 K/UL (ref 0–0.11)
IMM GRANULOCYTES NFR BLD AUTO: 0 % (ref 0–0.9)
LDLC SERPL CALC-MCNC: 79 MG/DL
LYMPHOCYTES # BLD AUTO: 1.81 K/UL (ref 1–4.8)
LYMPHOCYTES NFR BLD: 34.3 % (ref 22–41)
MCH RBC QN AUTO: 29.7 PG (ref 27–33)
MCHC RBC AUTO-ENTMCNC: 32.9 G/DL (ref 33.7–35.3)
MCV RBC AUTO: 90.2 FL (ref 81.4–97.8)
MONOCYTES # BLD AUTO: 0.36 K/UL (ref 0–0.85)
MONOCYTES NFR BLD AUTO: 6.8 % (ref 0–13.4)
NEUTROPHILS # BLD AUTO: 2.78 K/UL (ref 1.82–7.42)
NEUTROPHILS NFR BLD: 52.6 % (ref 44–72)
NRBC # BLD AUTO: 0 K/UL
NRBC BLD-RTO: 0 /100 WBC
PLATELET # BLD AUTO: 199 K/UL (ref 164–446)
PMV BLD AUTO: 11 FL (ref 9–12.9)
POTASSIUM SERPL-SCNC: 3.4 MMOL/L (ref 3.6–5.5)
PROT SERPL-MCNC: 6.5 G/DL (ref 6–8.2)
RBC # BLD AUTO: 5.8 M/UL (ref 4.7–6.1)
SODIUM SERPL-SCNC: 141 MMOL/L (ref 135–145)
TRIGL SERPL-MCNC: 83 MG/DL (ref 0–149)
WBC # BLD AUTO: 5.3 K/UL (ref 4.8–10.8)

## 2019-05-06 PROCEDURE — 83036 HEMOGLOBIN GLYCOSYLATED A1C: CPT

## 2019-05-06 PROCEDURE — 80061 LIPID PANEL: CPT

## 2019-05-06 PROCEDURE — 85025 COMPLETE CBC W/AUTO DIFF WBC: CPT

## 2019-05-06 PROCEDURE — 80053 COMPREHEN METABOLIC PANEL: CPT

## 2019-05-06 PROCEDURE — 36415 COLL VENOUS BLD VENIPUNCTURE: CPT

## 2019-05-13 ENCOUNTER — OFFICE VISIT (OUTPATIENT)
Dept: MEDICAL GROUP | Age: 64
End: 2019-05-13
Payer: COMMERCIAL

## 2019-05-13 VITALS
TEMPERATURE: 98.9 F | HEART RATE: 66 BPM | WEIGHT: 178.4 LBS | OXYGEN SATURATION: 98 % | BODY MASS INDEX: 26.42 KG/M2 | SYSTOLIC BLOOD PRESSURE: 112 MMHG | DIASTOLIC BLOOD PRESSURE: 60 MMHG | HEIGHT: 69 IN

## 2019-05-13 DIAGNOSIS — K21.9 GASTROESOPHAGEAL REFLUX DISEASE WITHOUT ESOPHAGITIS: ICD-10-CM

## 2019-05-13 DIAGNOSIS — R73.01 IFG (IMPAIRED FASTING GLUCOSE): ICD-10-CM

## 2019-05-13 DIAGNOSIS — I10 ESSENTIAL HYPERTENSION: ICD-10-CM

## 2019-05-13 DIAGNOSIS — E87.6 HYPOKALEMIA: ICD-10-CM

## 2019-05-13 DIAGNOSIS — E78.00 PURE HYPERCHOLESTEROLEMIA: ICD-10-CM

## 2019-05-13 DIAGNOSIS — J30.1 SEASONAL ALLERGIC RHINITIS DUE TO POLLEN: ICD-10-CM

## 2019-05-13 PROCEDURE — 99214 OFFICE O/P EST MOD 30 MIN: CPT | Performed by: INTERNAL MEDICINE

## 2019-05-13 RX ORDER — FLUTICASONE PROPIONATE 50 MCG
2 SPRAY, SUSPENSION (ML) NASAL DAILY
Qty: 16 G | Refills: 3 | Status: SHIPPED | OUTPATIENT
Start: 2019-05-13 | End: 2020-07-17

## 2019-05-13 RX ORDER — POTASSIUM CHLORIDE 20 MEQ/1
20 TABLET, EXTENDED RELEASE ORAL 2 TIMES DAILY
Qty: 180 TAB | Refills: 3 | Status: SHIPPED | OUTPATIENT
Start: 2019-05-13 | End: 2020-03-31 | Stop reason: SDUPTHER

## 2019-05-13 RX ORDER — ESOMEPRAZOLE MAGNESIUM 40 MG/1
40 CAPSULE, DELAYED RELEASE ORAL
Refills: 11 | COMMUNITY
Start: 2019-04-29 | End: 2019-05-13 | Stop reason: CLARIF

## 2019-05-13 RX ORDER — ESOMEPRAZOLE MAGNESIUM 40 MG/1
40 CAPSULE, DELAYED RELEASE ORAL 2 TIMES DAILY
COMMUNITY
End: 2020-03-31

## 2019-05-13 ASSESSMENT — PAIN SCALES - GENERAL: PAINLEVEL: NO PAIN

## 2019-05-13 ASSESSMENT — PATIENT HEALTH QUESTIONNAIRE - PHQ9: CLINICAL INTERPRETATION OF PHQ2 SCORE: 0

## 2019-05-13 NOTE — PROGRESS NOTES
Subjective:   Purvi Jaime is a 64 y.o. male here today for evaluation and management of:     IFG (impaired fasting glucose)  Chronic history of impaired fasting glucose. No prior history of diabetes and is not currently taking medications to manage his glucose. He is attempting to manage his glucose via diet and exercise. Glycohemoglobin was stable at 5.7 as noted below. No complaints of polydipsia or urinary frequency.     Results for PURVI JAIME (MRN [id number]) as of 5/13/2019 11:12   Ref. Range 11/6/2017 08:15 5/8/2018 08:13 5/6/2019 08:08   Glycohemoglobin Latest Ref Range: 0.0 - 5.6 % 6.1 (H) 6.0 (H) 5.7 (H)       Essential hypertension  Hypokalemia  Patient has a history of hypertension which is well controlled with Losartan 50 mg daily and Triamterene-HCTZ 37.5-25 mg daily. He is supposed to take a potassium supplement but denies taking this prescription for quite some time. He remains hypokalemic with a potassium of 3.4. Blood pressure is 112/60 today.  They report following a low sodium diet and deny any related complaints including chronic cough, chest pain, headaches or dizziness.     Reviewed and discussed lab results with the patient while in the clinic.  Results for PURVI JAIME (MRN [id number]) as of 5/13/2019 11:12   Ref. Range 12/23/2018 07:31 5/6/2019 08:08   Potassium Latest Ref Range: 3.6 - 5.5 mmol/L 3.3 (L) 3.4 (L)       Pure hypercholesterolemia  Chronic, stable history of hypercholesterolemia. He is currently taking Simvastatin 20 mg once every evening. Lipid panel was reevaluated on 05/06/19.  Cholesterol is stable. Triglycerides are significantly improved. Mild elevation of LDL. Liver enzymes were normal with the exception of minimal elevation of alkaline phosphatase. No medication side effects were reported. No acute complaints of myalgias, leg swelling, chest pain or abdominal pain.    Reviewed and discussed lab results with the patient while in the clinic.  Results  for PURVI GRACE (MRN [id number]) as of 5/13/2019 11:12   Ref. Range 5/8/2018 08:13 5/6/2019 08:08   Cholesterol,Tot Latest Ref Range: 100 - 199 mg/dL 142 143   Triglycerides Latest Ref Range: 0 - 149 mg/dL 171 (H) 83   HDL Latest Ref Range: >=40 mg/dL 41 47   LDL Latest Ref Range: <100 mg/dL 67 79      Ref. Range 12/23/2018 07:31 5/6/2019 08:08   AST(SGOT) Latest Ref Range: 12 - 45 U/L 22 16   ALT(SGPT) Latest Ref Range: 2 - 50 U/L 21 15   Alkaline Phosphatase Latest Ref Range: 30 - 99 U/L 90 103 (H)       Gastroesophageal reflux disease without esophagitis  Chronic history of GERD.  Dr. Phoenix Gu, Surgery, completed an EGD on 04/19/19 which indicated a hiatal hernia, mild esophagitis and small gastric polyp measuring. He was prescribed Nexium 40 mg twice daily. He is no longer taking Pantoprazole. Since starting Nexium, heart burn and acid reflux are improved. No acute complaints of chest pain, abdominal pain, nausea, vomiting or bowel changes.    Seasonal allergic rhinitis due to pollen  Patient has a chronic history of allergic rhinitis. His allergies and nasal congestion have worsened with the increase in pollen count. Previously, he was taking Flonase which worked well for his symptoms. Additionally, he is taking Allegra, anti-histamine, and reports no medication side effects. No acute upper respiratory infection symptoms.      Current medicines (including changes today)  Current Outpatient Prescriptions   Medication Sig Dispense Refill   • esomeprazole (NEXIUM) 40 MG delayed-release capsule Take 40 mg by mouth 2 Times a Day.     • fluticasone (FLONASE) 50 MCG/ACT nasal spray Spray 2 Sprays in nose every day. 16 g 3   • potassium chloride SA (KDUR) 20 MEQ Tab CR Take 1 Tab by mouth 2 times a day. 180 Tab 3   • simvastatin (ZOCOR) 20 MG Tab TAKE 1 TABLET BY MOUTH IN THE EVENING 90 Tab 3   • triamterene/hctz (MAXZIDE-25/DYAZIDE) 37.5-25 MG Cap TAKE ONE CAPSULE BY MOUTH EVERY DAY 90 Cap 3   • Omega-3  "Fatty Acids (OMEGA-3 FISH OIL PO) Take 1 Cap by mouth every day.     • losartan (COZAAR) 50 MG Tab Take 1 Tab by mouth every day. 90 Tab 3     No current facility-administered medications for this visit.      He  has a past medical history of GERD (gastroesophageal reflux disease); Hyperlipidemia; and Hypertension.    ROS   Negative for upper respiratory infection, chest pain, shortness of breath, nausea, vomiting, bowel changes or abdominal pain. See HPI for further details.       Objective:     /60 (BP Location: Right arm, Patient Position: Sitting, BP Cuff Size: Adult)   Pulse 66   Temp 37.2 °C (98.9 °F) (Temporal)   Ht 1.753 m (5' 9\")   Wt 80.9 kg (178 lb 6.4 oz)   SpO2 98%  Body mass index is 26.35 kg/m².     Physical Exam:  General: Alert, oriented and no acute distress.  Eye contact is good, speech goal directed, affect calm  HEENT: conjunctiva non-injected, sclera non-icteric.  Oral mucous membranes pink and moist with no lesions.  Pinna normal.   Lungs: Normal respiratory effort, clear to auscultation bilaterally with good excursion.  CV: regular rate and rhythm. No murmurs.   Abdomen: soft, non distended, nontender, Bowel sound normal.  Ext: no edema, color normal, vascularity normal, temperature normal      Assessment and Plan:   The following treatment plan was discussed:    1. Gastroesophageal reflux disease without esophagitis  - Continue same regimen of Nexium. Pantoprazole was discontinued once he started Nexium. No medication side effects were reported.  - Recommended he avoid acidic, spicy or high fat/greasy foods.   - Recheck labs 1-2 weeks prior to next appointment.  - Comp Metabolic Panel; Future    2. Essential hypertension  - Chronic, stable history. Continue Losartan 50 mg daily and Triamterene-HCTZ 37.5-25 mg daily. Continue potassium supplement.   - Recommend to monitor blood pressure and heart rate at home.  - Recheck labs 1-2 weeks prior to next appointment.  - Comp Metabolic " Panel; Future    3. Pure hypercholesterolemia  - Well-controlled. Continue current regimen of Simvastatin 20 mg once every evening.   - Reviewed the risks and benefits of treatment and potential side effects of medication.  - Advised to eat low fat, low carbohydrate and high fiber diet as well as do cardio physical exercise regularly.    Recheck lab 1-2 weeks before next follow up visit.  - Comp Metabolic Panel; Future  - Lipid Profile; Future    4. IFG (impaired fasting glucose)  - Well controlled. Glycohemoglobin was 5.7 on most recent labs. Recommended he follow a low fat, low carbohydrate diet and exercise more regularly.  - Plan to reevaluate labs 1-2 weeks prior to next appointment.  - Comp Metabolic Panel; Future  - HEMOGLOBIN A1C; Future    5. Seasonal allergic rhinitis due to pollen  - Continue Allegra or may take Claritin once a day. Continue Flonase and was provided a refill prescription which is available over the counter. Recommended he start nasal irrigation with Clint pot.   - fluticasone (FLONASE) 50 MCG/ACT nasal spray; Spray 2 Sprays in nose every day.  Dispense: 16 g; Refill: 3    6. Hypokalemia  - Remains hypokalemic secondary to anti-hypertensive medication. Patient has not taken potassium supplement and was asked to restart the prescription.  - potassium chloride SA (KDUR) 20 MEQ Tab CR; Take 1 Tab by mouth 2 times a day.  Dispense: 180 Tab; Refill: 3     7. Health Maintenance:  - Vaccines are up to date with the exception of zoster.      Follow up: Return in about 6 months (around 11/13/2019), or if symptoms worsen or fail to improve, for Impaired fasting glucose, Hypertension, Hyperlipidemia, GERD, Lab review.      Please note that this dictation was created using voice recognition software. I have made every reasonable attempt to correct obvious errors, but I expect that there may have unintended errors in text, spelling, punctuation, or grammar that I did not discover.    INafisa  (Scribe), am scribing for, and in the presence of, Eloisa Bolden M.D..    Electronically signed by: Nafisa Arana (Scribe), 5/13/2019    I, Eloisa Bolden M.D., personally performed the services described in this documentation, as scribed by Nafisa Arana in my presence, and it is both accurate and complete.

## 2019-11-06 ENCOUNTER — HOSPITAL ENCOUNTER (OUTPATIENT)
Dept: LAB | Facility: MEDICAL CENTER | Age: 64
End: 2019-11-06
Attending: INTERNAL MEDICINE
Payer: COMMERCIAL

## 2019-11-06 DIAGNOSIS — E78.00 PURE HYPERCHOLESTEROLEMIA: ICD-10-CM

## 2019-11-06 DIAGNOSIS — I10 ESSENTIAL HYPERTENSION: ICD-10-CM

## 2019-11-06 DIAGNOSIS — R73.01 IFG (IMPAIRED FASTING GLUCOSE): ICD-10-CM

## 2019-11-06 DIAGNOSIS — K21.9 GASTROESOPHAGEAL REFLUX DISEASE WITHOUT ESOPHAGITIS: ICD-10-CM

## 2019-11-06 LAB
ALBUMIN SERPL BCP-MCNC: 4.1 G/DL (ref 3.2–4.9)
ALBUMIN/GLOB SERPL: 1.4 G/DL
ALP SERPL-CCNC: 71 U/L (ref 30–99)
ALT SERPL-CCNC: 11 U/L (ref 2–50)
ANION GAP SERPL CALC-SCNC: 6 MMOL/L (ref 0–11.9)
AST SERPL-CCNC: 19 U/L (ref 12–45)
BILIRUB SERPL-MCNC: 1.1 MG/DL (ref 0.1–1.5)
BUN SERPL-MCNC: 18 MG/DL (ref 8–22)
CALCIUM SERPL-MCNC: 9.6 MG/DL (ref 8.5–10.5)
CHLORIDE SERPL-SCNC: 107 MMOL/L (ref 96–112)
CHOLEST SERPL-MCNC: 135 MG/DL (ref 100–199)
CO2 SERPL-SCNC: 29 MMOL/L (ref 20–33)
CREAT SERPL-MCNC: 0.97 MG/DL (ref 0.5–1.4)
EST. AVERAGE GLUCOSE BLD GHB EST-MCNC: 108 MG/DL
FASTING STATUS PATIENT QL REPORTED: NORMAL
GLOBULIN SER CALC-MCNC: 3 G/DL (ref 1.9–3.5)
GLUCOSE SERPL-MCNC: 91 MG/DL (ref 65–99)
HBA1C MFR BLD: 5.4 % (ref 0–5.6)
HDLC SERPL-MCNC: 47 MG/DL
LDLC SERPL CALC-MCNC: 65 MG/DL
POTASSIUM SERPL-SCNC: 3.9 MMOL/L (ref 3.6–5.5)
PROT SERPL-MCNC: 7.1 G/DL (ref 6–8.2)
SODIUM SERPL-SCNC: 142 MMOL/L (ref 135–145)
TRIGL SERPL-MCNC: 115 MG/DL (ref 0–149)

## 2019-11-06 PROCEDURE — 83036 HEMOGLOBIN GLYCOSYLATED A1C: CPT

## 2019-11-06 PROCEDURE — 80053 COMPREHEN METABOLIC PANEL: CPT

## 2019-11-06 PROCEDURE — 36415 COLL VENOUS BLD VENIPUNCTURE: CPT

## 2019-11-06 PROCEDURE — 80061 LIPID PANEL: CPT

## 2019-11-19 ENCOUNTER — APPOINTMENT (OUTPATIENT)
Dept: MEDICAL GROUP | Age: 64
End: 2019-11-19
Payer: COMMERCIAL

## 2019-11-25 ENCOUNTER — OFFICE VISIT (OUTPATIENT)
Dept: MEDICAL GROUP | Age: 64
End: 2019-11-25
Payer: COMMERCIAL

## 2019-11-25 VITALS
HEART RATE: 66 BPM | BODY MASS INDEX: 26.12 KG/M2 | TEMPERATURE: 98.4 F | SYSTOLIC BLOOD PRESSURE: 118 MMHG | DIASTOLIC BLOOD PRESSURE: 58 MMHG | HEIGHT: 67 IN | OXYGEN SATURATION: 98 % | WEIGHT: 166.4 LBS

## 2019-11-25 DIAGNOSIS — Z87.19 HISTORY OF HIATAL HERNIA: ICD-10-CM

## 2019-11-25 DIAGNOSIS — E78.00 PURE HYPERCHOLESTEROLEMIA: ICD-10-CM

## 2019-11-25 DIAGNOSIS — Z00.00 ROUTINE GENERAL MEDICAL EXAMINATION AT HEALTH CARE FACILITY: ICD-10-CM

## 2019-11-25 DIAGNOSIS — R73.01 IFG (IMPAIRED FASTING GLUCOSE): ICD-10-CM

## 2019-11-25 DIAGNOSIS — I10 ESSENTIAL HYPERTENSION: ICD-10-CM

## 2019-11-25 PROBLEM — H53.8 BLURRED VISION, BILATERAL: Status: RESOLVED | Noted: 2018-10-19 | Resolved: 2019-11-25

## 2019-11-25 PROBLEM — Z09 HOSPITAL DISCHARGE FOLLOW-UP: Status: RESOLVED | Noted: 2019-01-07 | Resolved: 2019-11-25

## 2019-11-25 PROCEDURE — 99396 PREV VISIT EST AGE 40-64: CPT | Performed by: INTERNAL MEDICINE

## 2019-11-25 RX ORDER — LOSARTAN POTASSIUM 50 MG/1
50 TABLET ORAL DAILY
Qty: 90 TAB | Refills: 3 | Status: SHIPPED | OUTPATIENT
Start: 2019-11-25 | End: 2020-03-31 | Stop reason: SDUPTHER

## 2019-11-25 SDOH — HEALTH STABILITY: MENTAL HEALTH: HOW MANY STANDARD DRINKS CONTAINING ALCOHOL DO YOU HAVE ON A TYPICAL DAY?: 3 OR 4

## 2019-11-25 SDOH — HEALTH STABILITY: MENTAL HEALTH: HOW OFTEN DO YOU HAVE A DRINK CONTAINING ALCOHOL?: 2-3 TIMES A WEEK

## 2019-11-25 SDOH — HEALTH STABILITY: MENTAL HEALTH: HOW OFTEN DO YOU HAVE 6 OR MORE DRINKS ON ONE OCCASION?: MONTHLY

## 2019-11-25 ASSESSMENT — PAIN SCALES - GENERAL: PAINLEVEL: NO PAIN

## 2019-11-25 NOTE — PROGRESS NOTES
Chief Complaint: annual exam    Karsten Jaime is a 64 y.o. male who presents for a general exam    Last colonoscopy: 11/10/14  Last Td: 9/15/14  Hx STDs: no  Regular exercise: 2x weekly    Routine general medical examination at [facility] care facility  Patient is seen today for annual exam and reports he is overall doing well. He does walking exercise 2x weekly. He reports rare alcohol use and is not a smoker.    Essential hypertension  Chronic. Patient reports compliancy with losartan 50 mg QD and triamterene-HCT 37.5-25 mg QD and denies any associated side effects. BP is normal in clinic today at 118/58. He denies any chest pain, headaches, lightheadedness, or lower extremity edema.  He is taking potassium chloride 20 mEq twice a day and his potassium level improved with supplements in recent blood test on 11/6/2019.    Pure hypercholesterolemia  Chronic. Patient reports compliancy with simvastatin 20 mg QN and denies any associated side effects. He denies any chest pain or claudication. Today we reviewed blood work from 11/06/19 which was normal with tot 135; tri 115; HDL 47; LDL 65. The 10-year ASCVD risk score (Vijaya DC Jr., et al., 2013) is: 7.7%.     I reviewed recent blood work with the patient in clinic today.   Ref. Range 5/6/2019 08:08 11/6/2019 07:57   Cholesterol,Tot Latest Ref Range: 100 - 199 mg/dL 143 135   Triglycerides Latest Ref Range: 0 - 149 mg/dL 83 115   HDL Latest Ref Range: >=40 mg/dL 47 47   LDL Latest Ref Range: <100 mg/dL 79 65     IFG (impaired fasting glucose)  Chronic. Patient is currently attempting to control through lifestyle modification including balanced diet and regular exercise. Today we reviewed blood work from 11/06/19 which was improved with glucose 91, A1c 5.4.     I reviewed recent blood work with the patient in clinic today.   Ref. Range 5/6/2019 08:08 11/6/2019 07:57   Glucose Latest Ref Range: 65 - 99 mg/dL 113 (H) 91   Glycohemoglobin Latest Ref Range: 0.0 - 5.6 %  5.7 (H) 5.4   Estim. Avg Glu Latest Units: mg/dL 117 108   Fasting Status Unknown Fasting Fasting     History of hiatal hernia  Patient reports a hiatal hernia repair on 7/12/19 with Dr. Phoenix Gu (Surgery) at [facility]. He reports a history of GERD previously controlled with esomeprazole 40 mg BID, however GERD has completely resolved following this operation and he only uses esomeprazole on PRN basis. He denies any abdominal pain, heartburn, nausea, or vomiting.    He  has a past medical history of GERD (gastroesophageal reflux disease), Hyperlipidemia, and Hypertension.  He  has a past surgical history that includes pr upper gi endoscopy,exam; egd w/endoscopic ultrasound (2/21/2013); gastroscopy with biopsy (2/21/2013); gastroscopy with biopsy (1/29/2014); cataract phaco with iol (Right, 7/24/2017); and cataract phaco with iol (Left, 8/14/2017).  Family History   Problem Relation Age of Onset   • Genetic Disorder Mother 75        GERD   • Diabetes Father    • Hypertension Brother    • Diabetes Brother      Social History     Tobacco Use   • Smoking status: Never Smoker   • Smokeless tobacco: Never Used   Substance Use Topics   • Alcohol use: Yes     Frequency: 2-3 times a week     Drinks per session: 3 or 4     Binge frequency: Monthly     Comment: Occasionally   • Drug use: No       Current Outpatient Medications   Medication Sig Dispense Refill   • losartan (COZAAR) 50 MG Tab Take 1 Tab by mouth every day. 90 Tab 3   • esomeprazole (NEXIUM) 40 MG delayed-release capsule Take 40 mg by mouth 2 Times a Day.     • fluticasone (FLONASE) 50 MCG/ACT nasal spray Spray 2 Sprays in nose every day. 16 g 3   • potassium chloride SA (KDUR) 20 MEQ Tab CR Take 1 Tab by mouth 2 times a day. 180 Tab 3   • simvastatin (ZOCOR) 20 MG Tab TAKE 1 TABLET BY MOUTH IN THE EVENING 90 Tab 3   • triamterene/hctz (MAXZIDE-25/DYAZIDE) 37.5-25 MG Cap TAKE ONE CAPSULE BY MOUTH EVERY DAY 90 Cap 3   • Omega-3 Fatty Acids (OMEGA-3 FISH OIL PO)  "Take 1 Cap by mouth every day.       No current facility-administered medications for this visit.     (including changes today)  Allergies: Padmini-seltzer [aspirin effervescent] and Aspirin    Review Of Systems  Denies fever, chills, or sweats  Skin: negative for rash, scaling, itching, pigmentation, hair or nail changes.  Eyes: negative for visual blurring, double vision, eye pain, floaters and discharge from eyes  Ears/Nose/Throat: negative for tinnitus, vertigo, bleeding gums, dental problem and hoarseness, frequent URI's, sinus trouble, persistent sore throat  Respiratory: negative for persistent cough, hemoptysis, dyspnea, recurrent pneumonia or bronchitis, asthma and wheezing  Cardiovascular: negative for palpitations, tachycardia, irregular heart beat, chest pain, or peripheral edema.  Gastrointestinal: negative for poor appetite, dysphagia, nausea, heartburn or reflux, abdominal pain, hemorrhoids, constipation or diarrhea  Genitourinary: negative for nocturia, dysuria, frequency, hesitancy, incontinence, sexually transmitted disease, abnormal penile discharge, or ED.  Musculoskeletal: negative for joint swelling and muscle pain/ soreness  Neurologic: negative for migraine headaches, involuntary movements or tremor  Psychiatric: negative for excessive alcohol consumption or illegal drug usage, sleep disturbance, anxiety, depression, sexual difficulties  Hematologic/Lymphatic/Immunologic: negative for anemia, unusual bruising, swollen glands, HIV risk factors  Endocrine: negative for temperature intolerance, polydipsia, polyuria, unintentional weight changes.       PHYSICAL EXAMINATION:  /58 (BP Location: Left arm, Patient Position: Sitting, BP Cuff Size: Adult long)   Pulse 66   Temp 36.9 °C (98.4 °F) (Temporal)   Ht 1.705 m (5' 7.13\")   Wt 75.5 kg (166 lb 6.4 oz)   SpO2 98%   Body mass index is 25.96 kg/m².  Wt Readings from Last 4 Encounters:   11/25/19 75.5 kg (166 lb 6.4 oz)   05/13/19 80.9 kg " (178 lb 6.4 oz)   01/07/19 83.8 kg (184 lb 11.2 oz)   12/23/18 85 kg (187 lb 6.3 oz)       Constitutional: Alert, no distress.  Skin: Warm, dry, good turgor, no rashes in visible areas.  Eye: Equal, round and reactive, conjunctiva clear, lids normal.  ENMT: Lips without lesions, good dentition, oropharynx clear.  Neck: Trachea midline, no masses, no thyromegaly.  Respiratory: Unlabored respiratory effort, lungs clear to auscultation, no wheezes, no rhonchi.  Cardiovascular: Normal S1, S2, no murmur, no edema.  Abdomen: Soft, non-tender, no masses, no hepatosplenomegaly.  Psych: Alert and oriented x3, normal affect and mood.  Genitalia: deferred  Rectal: deferred  Prostate: Deferred    ASSESSMENT/PLAN:  The following treatment plan was discussed:    1. Routine general medical examination at health care facility  - Patient was seen today for annual wellness visit during which we reviewed the medical history, acute complaints, and preventative care. Patient was counseled on regular exercise and a balanced diet.     2. Essential hypertension  - Well-controlled. Continue current regimen, losartan 50 mg QD and triamterene-HCT 37.5-25 mg QD. I reviewed potential risks, benefits, and side effects of this medication with the patient in clinic today. Losartan was refilled today.  - Discussed to eat low-sodium diet and encouraged to do regular physical exercise.  - Recommend to monitor blood pressure and heart rate at home.  - Plan to continue to monitor with blood work, which will be ordered and reviewed by their new PCP.  - losartan (COZAAR) 50 MG Tab; Take 1 Tab by mouth every day.  Dispense: 90 Tab; Refill: 3    3. Pure hypercholesterolemia  - Well-controlled. Continue current regimen, simvastatin 20 mg QN. I reviewed potential risks, benefits, and side effects of this medication with the patient in clinic today.  - Advised to eat low fat, low carbohydrate and high fiber diet as well as do cardio physical exercise  regularly.   - Plan to continue to monitor with blood work, which will be ordered and reviewed by their new PCP.    4. IFG (impaired fasting glucose)  - Well-controlled, A1c 5.4 on 11/06/19. Continue current regimen, lifestyle modification with balanced diet and regular exercise.   - Plan to continue to monitor with blood work, which will be ordered and reviewed by his new PCP.    5. History of hiatal hernia  - Patient had hiatal hernia repair on 7/12/19 with Dr. Phoenix Gu (Surgery) at [facility].   - He reports GERD previously controlled with esomeprazole 40 mg BID completely resolved following this operation and he only uses esomeprazole on PRN basis. I reviewed potential risks, benefits, and side effects of this medication with the patient in clinic today.    6. Health care maintenance  - Patient reports receiving flu vaccine 09/13/19 at [facility] pharmacy.  - Patient was advised to inquire about the shingles vaccine at their local pharmacy due to clinic shortage. I reviewed the potential risks, benefits, and side effects with the patient.    Labs per orders  Counseling about diet, exercise, skin care  Vaccinations per orders  Next office visit for recheck of chronic medical conditions is due in 3 months to establish with new PCP.    I, Pat De Paz (Mio), am scribing for, and in the presence of, Eloisa Bolden M.D.. Electronically signed by: Pat De Paz (Mio), 11/25/2019.    I, Eloisa Bolden M.D., personally performed the services described in this documentation, as scribed by Pat De Paz in my presence, and it is both accurate and complete.

## 2020-03-31 ENCOUNTER — OFFICE VISIT (OUTPATIENT)
Dept: MEDICAL GROUP | Age: 65
End: 2020-03-31
Payer: COMMERCIAL

## 2020-03-31 VITALS
DIASTOLIC BLOOD PRESSURE: 68 MMHG | TEMPERATURE: 97.6 F | SYSTOLIC BLOOD PRESSURE: 120 MMHG | HEART RATE: 65 BPM | HEIGHT: 67 IN | WEIGHT: 173.2 LBS | OXYGEN SATURATION: 98 % | BODY MASS INDEX: 27.18 KG/M2

## 2020-03-31 DIAGNOSIS — M54.50 ACUTE LEFT-SIDED LOW BACK PAIN WITHOUT SCIATICA: ICD-10-CM

## 2020-03-31 DIAGNOSIS — E78.00 PURE HYPERCHOLESTEROLEMIA: ICD-10-CM

## 2020-03-31 DIAGNOSIS — E87.6 HYPOKALEMIA: ICD-10-CM

## 2020-03-31 DIAGNOSIS — I10 ESSENTIAL HYPERTENSION: ICD-10-CM

## 2020-03-31 PROBLEM — Z87.19 HISTORY OF HIATAL HERNIA: Status: RESOLVED | Noted: 2019-11-25 | Resolved: 2020-03-31

## 2020-03-31 PROBLEM — Z00.00 ROUTINE GENERAL MEDICAL EXAMINATION AT HEALTH CARE FACILITY: Status: RESOLVED | Noted: 2018-11-12 | Resolved: 2020-03-31

## 2020-03-31 PROCEDURE — 99214 OFFICE O/P EST MOD 30 MIN: CPT | Performed by: PHYSICIAN ASSISTANT

## 2020-03-31 RX ORDER — TRIAMTERENE AND HYDROCHLOROTHIAZIDE 37.5; 25 MG/1; MG/1
1 CAPSULE ORAL
Qty: 90 CAP | Refills: 3 | Status: SHIPPED | OUTPATIENT
Start: 2020-03-31 | End: 2020-06-29

## 2020-03-31 RX ORDER — LOSARTAN POTASSIUM 50 MG/1
50 TABLET ORAL DAILY
Qty: 90 TAB | Refills: 3 | Status: SHIPPED | OUTPATIENT
Start: 2020-03-31 | End: 2021-04-09

## 2020-03-31 RX ORDER — SIMVASTATIN 20 MG
20 TABLET ORAL
Qty: 90 TAB | Refills: 3 | Status: SHIPPED | OUTPATIENT
Start: 2020-03-31 | End: 2020-06-29

## 2020-03-31 RX ORDER — POTASSIUM CHLORIDE 20 MEQ/1
20 TABLET, EXTENDED RELEASE ORAL DAILY
Qty: 90 TAB | Refills: 3 | Status: SHIPPED | OUTPATIENT
Start: 2020-03-31 | End: 2020-06-29

## 2020-03-31 ASSESSMENT — PATIENT HEALTH QUESTIONNAIRE - PHQ9: CLINICAL INTERPRETATION OF PHQ2 SCORE: 0

## 2020-03-31 ASSESSMENT — FIBROSIS 4 INDEX: FIB4 SCORE: 1.78

## 2020-03-31 NOTE — ASSESSMENT & PLAN NOTE
Is currently taking 20 mg of simvastatin.  His LDL has been well controlled.  He does need refill today.

## 2020-03-31 NOTE — PROGRESS NOTES
"cc: Establish care, medication refill, back pain  Subjective:     HPI  Karsten Jaime is a 64 y.o. male presenting to [facility].  He was previously a patient of Dr. Bolden's and is transferring care.  He is up-to-date on his colonoscopy.    Essential hypertension  His blood pressure has been well controlled on current combinations of medications.  He was previously noted to be hypokalemic, and was started on 20 meq of potassium twice daily.  He states that for the past week or 2 he has just felt \"funny at night\", more restless.  He stopped taking the potassium and symptoms resolved.  He states during the day he had no issues with the potassium.  Denies dizziness, chest pain, palpitations, shortness of breath, lower extremity edema    Pure hypercholesterolemia  Is currently taking 20 mg of simvastatin.  His LDL has been well controlled.  He does need refill today.    Low back pain  3 weeks ago he stood up and had sudden onset of left lower back pain.  He states that it has slightly improved throughout the weeks.  Is most bothersome when he goes from a sitting to standing position.  No pain with sitting or walking.  Does not radiate.  He has been doing gentle stretches at home.  Is not taking anything over-the-counter.  Denies weakness, numbness/tingling, loss of bowel or bladder function.    Review of systems:  See above.       Current Outpatient Medications:   •  losartan (COZAAR) 50 MG Tab, Take 1 Tab by mouth every day., Disp: 90 Tab, Rfl: 3  •  triamterene/hctz (MAXZIDE-25/DYAZIDE) 37.5-25 MG Cap, Take 1 Cap by mouth every day for 90 days., Disp: 90 Cap, Rfl: 3  •  potassium chloride SA (KDUR) 20 MEQ Tab CR, Take 1 Tab by mouth every day for 90 days., Disp: 90 Tab, Rfl: 3  •  simvastatin (ZOCOR) 20 MG Tab, Take 1 Tab by mouth every day for 90 days. N THE EVENING, Disp: 90 Tab, Rfl: 3  •  fluticasone (FLONASE) 50 MCG/ACT nasal spray, Spray 2 Sprays in nose every day., Disp: 16 g, Rfl: 3  •  Omega-3 Fatty " "Acids (OMEGA-3 FISH OIL PO), Take 1 Cap by mouth every day., Disp: , Rfl:     Allergies, past medical history, past surgical history, family history, social history reviewed and updated    Objective:     Vitals: /68 (BP Location: Left arm, Patient Position: Sitting, BP Cuff Size: Adult)   Pulse 65   Temp 36.4 °C (97.6 °F) (Temporal)   Ht 1.705 m (5' 7.13\")   Wt 78.6 kg (173 lb 3.2 oz)   SpO2 98%   BMI 27.02 kg/m²   General: Alert, pleasant, NAD  HEENT: Normocephalic. Neck supple.  No thyromegaly or masses palpated. No cervical or supraclavicular lymphadenopathy. No carotid bruits   Heart: Regular rate and rhythm.  S1 and S2 normal.  No murmurs appreciated.  Respiratory: Normal respiratory effort.  Clear to auscultation bilaterally.  Back: FROM-pain elicited with left lateral rotation, normal curvature of spine, no spinous process tenderness, no paraspinous muscle tenderness.  Minimal left SI joint tenderness.  Negative straight leg raise, 5/5 strength of lower extremities bilaterally. Sensation/DTRs intact bilaterally.  Skin: Warm, dry, no rashes.  Extremities: No leg edema.  Radial pulses 2+ symmetric  Psych:  Affect/mood is normal, judgement is good, memory is intact, grooming is appropriate.    Assessment/Plan:     Karsten was seen today for establish care, medication refill, back pain and medication reaction.    Diagnoses and all orders for this visit:    Essential hypertension  -Blood pressure is well controlled.  We will continue with current dose of losartan and Maxide- Dyazide.  Advised intermittent BP checks.  -     Basic Metabolic Panel; Future  -     losartan (COZAAR) 50 MG Tab; Take 1 Tab by mouth every day.  -     triamterene/hctz (MAXZIDE-25/DYAZIDE) 37.5-25 MG Cap; Take 1 Cap by mouth every day for 90 days.    Pure hypercholesterolemia  -LDL is well controlled.  Continue 20 mg of simvastatin.  -     simvastatin (ZOCOR) 20 MG Tab; Take 1 Tab by mouth every day for 90 days. N THE " EVENING    Hypokalemia  -Was previously noted to be hypokalemic, was started on potassium.  Will decrease his potassium to 20 Meq once a day and repeat potassium levels in 4 weeks.  If still within normal limits, or upper limits of normal may consider DC potassium altogether.  -     Basic Metabolic Panel; Future  -     potassium chloride SA (KDUR) 20 MEQ Tab CR; Take 1 Tab by mouth every day for 90 days.    Acute left-sided low back pain without sciatica  -Discussed exam findings with patient.  Reviewed gentle stretching and strengthening exercises.  Ice and ibuprofen as needed.  If he has worsening or no improvement in the next 3 to 4 weeks advised to call the clinic and will place referral to physical therapy.  Cauda equina precautions reviewed.      Return in about 8 months (around 11/30/2020) for Annual PX.

## 2020-03-31 NOTE — ASSESSMENT & PLAN NOTE
"His blood pressure has been well controlled on current combinations of medications.  He was previously noted to be hypokalemic, and was started on 20 meq of potassium twice daily.  He states that for the past week or 2 he has just felt \"funny at night\", more restless.  He stopped taking the potassium and symptoms resolved.  He states during the day he had no issues with the potassium.  Denies dizziness, chest pain, palpitations, shortness of breath, lower extremity edema  "

## 2020-05-05 ENCOUNTER — HOSPITAL ENCOUNTER (OUTPATIENT)
Dept: LAB | Facility: MEDICAL CENTER | Age: 65
End: 2020-05-05
Attending: PHYSICIAN ASSISTANT
Payer: COMMERCIAL

## 2020-05-05 DIAGNOSIS — I10 ESSENTIAL HYPERTENSION: ICD-10-CM

## 2020-05-05 DIAGNOSIS — E87.6 HYPOKALEMIA: ICD-10-CM

## 2020-05-05 LAB
ANION GAP SERPL CALC-SCNC: 11 MMOL/L (ref 7–16)
BUN SERPL-MCNC: 16 MG/DL (ref 8–22)
CALCIUM SERPL-MCNC: 8.7 MG/DL (ref 8.5–10.5)
CHLORIDE SERPL-SCNC: 104 MMOL/L (ref 96–112)
CO2 SERPL-SCNC: 24 MMOL/L (ref 20–33)
CREAT SERPL-MCNC: 1 MG/DL (ref 0.5–1.4)
FASTING STATUS PATIENT QL REPORTED: NORMAL
GLUCOSE SERPL-MCNC: 99 MG/DL (ref 65–99)
POTASSIUM SERPL-SCNC: 3.7 MMOL/L (ref 3.6–5.5)
SODIUM SERPL-SCNC: 139 MMOL/L (ref 135–145)

## 2020-05-05 PROCEDURE — 36415 COLL VENOUS BLD VENIPUNCTURE: CPT

## 2020-05-05 PROCEDURE — 80048 BASIC METABOLIC PNL TOTAL CA: CPT

## 2020-06-12 ENCOUNTER — OFFICE VISIT (OUTPATIENT)
Dept: MEDICAL GROUP | Age: 65
End: 2020-06-12
Payer: COMMERCIAL

## 2020-06-12 ENCOUNTER — HOSPITAL ENCOUNTER (OUTPATIENT)
Dept: LAB | Facility: MEDICAL CENTER | Age: 65
End: 2020-06-12
Attending: FAMILY MEDICINE
Payer: COMMERCIAL

## 2020-06-12 VITALS
TEMPERATURE: 98.6 F | SYSTOLIC BLOOD PRESSURE: 118 MMHG | HEIGHT: 67 IN | DIASTOLIC BLOOD PRESSURE: 70 MMHG | WEIGHT: 170.8 LBS | OXYGEN SATURATION: 98 % | BODY MASS INDEX: 26.81 KG/M2 | HEART RATE: 60 BPM

## 2020-06-12 DIAGNOSIS — I10 ESSENTIAL HYPERTENSION: ICD-10-CM

## 2020-06-12 DIAGNOSIS — J30.1 SEASONAL ALLERGIC RHINITIS DUE TO POLLEN: ICD-10-CM

## 2020-06-12 DIAGNOSIS — Z00.00 ANNUAL PHYSICAL EXAM: ICD-10-CM

## 2020-06-12 DIAGNOSIS — E78.00 PURE HYPERCHOLESTEROLEMIA: ICD-10-CM

## 2020-06-12 DIAGNOSIS — N40.0 BENIGN PROSTATIC HYPERPLASIA WITHOUT LOWER URINARY TRACT SYMPTOMS: ICD-10-CM

## 2020-06-12 DIAGNOSIS — D13.1: ICD-10-CM

## 2020-06-12 LAB
ALBUMIN SERPL BCP-MCNC: 4.1 G/DL (ref 3.2–4.9)
ALBUMIN/GLOB SERPL: 1.4 G/DL
ALP SERPL-CCNC: 84 U/L (ref 30–99)
ALT SERPL-CCNC: 17 U/L (ref 2–50)
ANION GAP SERPL CALC-SCNC: 10 MMOL/L (ref 7–16)
AST SERPL-CCNC: 19 U/L (ref 12–45)
BILIRUB SERPL-MCNC: 1.1 MG/DL (ref 0.1–1.5)
BUN SERPL-MCNC: 16 MG/DL (ref 8–22)
CALCIUM SERPL-MCNC: 9.3 MG/DL (ref 8.5–10.5)
CHLORIDE SERPL-SCNC: 104 MMOL/L (ref 96–112)
CHOLEST SERPL-MCNC: 132 MG/DL (ref 100–199)
CO2 SERPL-SCNC: 26 MMOL/L (ref 20–33)
CREAT SERPL-MCNC: 1.03 MG/DL (ref 0.5–1.4)
ERYTHROCYTE [DISTWIDTH] IN BLOOD BY AUTOMATED COUNT: 42.5 FL (ref 35.9–50)
FASTING STATUS PATIENT QL REPORTED: NORMAL
GLOBULIN SER CALC-MCNC: 2.9 G/DL (ref 1.9–3.5)
GLUCOSE SERPL-MCNC: 94 MG/DL (ref 65–99)
HCT VFR BLD AUTO: 48.3 % (ref 42–52)
HDLC SERPL-MCNC: 45 MG/DL
HGB BLD-MCNC: 16.5 G/DL (ref 14–18)
LDLC SERPL CALC-MCNC: 61 MG/DL
MCH RBC QN AUTO: 31 PG (ref 27–33)
MCHC RBC AUTO-ENTMCNC: 34.2 G/DL (ref 33.7–35.3)
MCV RBC AUTO: 90.6 FL (ref 81.4–97.8)
PLATELET # BLD AUTO: 178 K/UL (ref 164–446)
PMV BLD AUTO: 11.8 FL (ref 9–12.9)
POTASSIUM SERPL-SCNC: 3.7 MMOL/L (ref 3.6–5.5)
PROT SERPL-MCNC: 7 G/DL (ref 6–8.2)
PSA SERPL-MCNC: 4.52 NG/ML (ref 0–4)
RBC # BLD AUTO: 5.33 M/UL (ref 4.7–6.1)
SODIUM SERPL-SCNC: 140 MMOL/L (ref 135–145)
T3FREE SERPL-MCNC: 3.2 PG/ML (ref 2–4.4)
T4 FREE SERPL-MCNC: 1.42 NG/DL (ref 0.93–1.7)
TRIGL SERPL-MCNC: 132 MG/DL (ref 0–149)
TSH SERPL DL<=0.005 MIU/L-ACNC: 0.81 UIU/ML (ref 0.38–5.33)
WBC # BLD AUTO: 4.2 K/UL (ref 4.8–10.8)

## 2020-06-12 PROCEDURE — 84481 FREE ASSAY (FT-3): CPT

## 2020-06-12 PROCEDURE — 80053 COMPREHEN METABOLIC PANEL: CPT

## 2020-06-12 PROCEDURE — 84153 ASSAY OF PSA TOTAL: CPT

## 2020-06-12 PROCEDURE — 80061 LIPID PANEL: CPT

## 2020-06-12 PROCEDURE — 84443 ASSAY THYROID STIM HORMONE: CPT

## 2020-06-12 PROCEDURE — 36415 COLL VENOUS BLD VENIPUNCTURE: CPT

## 2020-06-12 PROCEDURE — 85027 COMPLETE CBC AUTOMATED: CPT

## 2020-06-12 PROCEDURE — 84439 ASSAY OF FREE THYROXINE: CPT

## 2020-06-12 PROCEDURE — 99396 PREV VISIT EST AGE 40-64: CPT | Performed by: FAMILY MEDICINE

## 2020-06-12 RX ORDER — DEXLANSOPRAZOLE 60 MG/1
60 CAPSULE, DELAYED RELEASE ORAL
COMMUNITY
End: 2020-10-26

## 2020-06-12 ASSESSMENT — FIBROSIS 4 INDEX: FIB4 SCORE: 1.81

## 2020-06-12 NOTE — PROGRESS NOTES
Subjective:     CC:   Chief Complaint   Patient presents with   • Annual Exam   • Medication Management     potassium questions, taking too much?       HPI:   Karsten Jaime is a 65 y.o. male who presents for annual exam    Last Colorectal Cancer Screenin2019  Last Tdap: 09/15/2014  Received HPV series: No  Hx STDs: No    Exercise: no regular exercise, sedentary  Diet: REGULAR      He  has a past medical history of GERD (gastroesophageal reflux disease), History of hiatal hernia (2019), Hyperlipidemia, Hypertension, and IFG (impaired fasting glucose) (5/10/2016).  He  has a past surgical history that includes pr upper gi endoscopy,exam; egd w/endoscopic ultrasound (2013); gastroscopy with biopsy (2013); gastroscopy with biopsy (2014); cataract phaco with iol (Right, 2017); and cataract phaco with iol (Left, 2017).    Family History   Problem Relation Age of Onset   • Genetic Disorder Mother 75        GERD   • Diabetes Father    • Hypertension Brother    • Diabetes Brother      Social History     Tobacco Use   • Smoking status: Never Smoker   • Smokeless tobacco: Never Used   Substance Use Topics   • Alcohol use: Yes     Frequency: 2-3 times a week     Drinks per session: 3 or 4     Binge frequency: Monthly     Comment: Occasionally   • Drug use: No     He  reports being sexually active and has had partner(s) who are Female.    Patient Active Problem List    Diagnosis Date Noted   • Seasonal allergic rhinitis due to pollen 05/10/2016   • Pure hypercholesterolemia 2016   • Essential hypertension 2015   • Benign tumor of cardia of stomach 2014     Current Outpatient Medications   Medication Sig Dispense Refill   • Dexlansoprazole (DEXILANT) 60 MG CAPSULE DELAYED RELEASE delayed-release capsule Take 60 mg by mouth.     • losartan (COZAAR) 50 MG Tab Take 1 Tab by mouth every day. 90 Tab 3   • triamterene/hctz (MAXZIDE-25/DYAZIDE) 37.5-25 MG Cap Take 1 Cap by  "mouth every day for 90 days. 90 Cap 3   • potassium chloride SA (KDUR) 20 MEQ Tab CR Take 1 Tab by mouth every day for 90 days. 90 Tab 3   • simvastatin (ZOCOR) 20 MG Tab Take 1 Tab by mouth every day for 90 days. N THE EVENING 90 Tab 3   • fluticasone (FLONASE) 50 MCG/ACT nasal spray Spray 2 Sprays in nose every day. 16 g 3   • Omega-3 Fatty Acids (OMEGA-3 FISH OIL PO) Take 1 Cap by mouth every day.       No current facility-administered medications for this visit.      Allergies   Allergen Reactions   • Padmini-[place] [Aspirin Effervescent]      'can not breath to good and sneezing'   • Aspirin      'can not breathe to good and sneezing'   • Pollen Extract        Review of Systems   Constitutional: Negative for fever, chills and malaise/fatigue.   HENT: Negative for congestion.    Eyes: Negative for pain.   Respiratory: Negative for cough and shortness of breath.    Cardiovascular: Negative for chest pain and leg swelling.   Gastrointestinal: Negative for nausea, vomiting, abdominal pain and diarrhea.   Genitourinary: Negative for dysuria and hematuria.   Skin: Negative for rash.   Neurological: Negative for dizziness, focal weakness and headaches.   Endo/Heme/Allergies: Does not bruise/bleed easily.   Psychiatric/Behavioral: Negative for depression.  The patient is not nervous/anxious.      Objective:   /70 (BP Location: Left arm, Patient Position: Sitting, BP Cuff Size: Adult)   Pulse 60   Temp 37 °C (98.6 °F)   Ht 1.705 m (5' 7.13\")   Wt 77.5 kg (170 lb 12.8 oz)   SpO2 98%   BMI 26.65 kg/m²      Wt Readings from Last 4 Encounters:   06/12/20 77.5 kg (170 lb 12.8 oz)   03/31/20 78.6 kg (173 lb 3.2 oz)   11/25/19 75.5 kg (166 lb 6.4 oz)   05/13/19 80.9 kg (178 lb 6.4 oz)           Physical Exam:  Constitutional: Well-developed and well-nourished. Not diaphoretic. No distress.   Skin: Skin is warm and dry. No rash noted.  Head: Atraumatic without lesions.  Eyes: Conjunctivae and extraocular motions are " normal. Pupils are equal, round, and reactive to light. No scleral icterus.   Ears:  External ears unremarkable. Tympanic membranes clear and intact.  Nose: Nares patent. Septum midline. Turbinates without erythema nor edema. No discharge.   Mouth/Throat: Tongue normal. Oropharynx is clear and moist. Posterior pharynx without erythema or exudates.  Neck: Supple, trachea midline. Normal range of motion. No thyromegaly present. No lymphadenopathy--cervical or supraclavicular.  Cardiovascular: Regular rate and rhythm, S1 and S2 without murmur, rubs, or gallops.    Abdomen: Soft, non tender, and without distention. Active bowel sounds in all four quadrants. No rebound, guarding, masses or HSM.    Extremities: No cyanosis, clubbing, erythema, nor edema. Distal pulses intact and symmetric.   Musculoskeletal: All major joints AROM full in all directions without pain.  Neurological: Alert and oriented x 3. Grossly non-focal. Strength and sensation grossly intact. DTRs 2+/3 and symmetric.   Psychiatric:  Behavior, mood, and affect are appropriate.      Assessment and Plan:     1. Annual physical exam  Health maintenance:  Due for labs  Labs per orders  Immunizations per orders  Patient counseled about skin care, diet, supplements, and exercise.  Discussed diet and exercise         - Comp Metabolic Panel; Future  - CBC WITHOUT DIFFERENTIAL; Future  - Lipid Profile; Future  - TSH+FREE T4  - T3 FREE; Future      - PROSTATE SPECIFIC AG DIAGNOSTIC; Future      Other orders  - Dexlansoprazole (DEXILANT) 60 MG CAPSULE DELAYED RELEASE delayed-release capsule; Take 60 mg by mouth.      Follow-up: Return in about 6 months (around 12/12/2020) for Reevaluation, labs.

## 2020-06-16 DIAGNOSIS — R97.20 ELEVATED PSA: ICD-10-CM

## 2020-06-17 ENCOUNTER — TELEPHONE (OUTPATIENT)
Dept: MEDICAL GROUP | Age: 65
End: 2020-06-17

## 2020-06-17 NOTE — TELEPHONE ENCOUNTER
Phone Number Called: [phone] (home)       Call outcome: Did not leave a detailed message. Requested patient to call back.    Message: 1st attempt

## 2020-06-17 NOTE — TELEPHONE ENCOUNTER
----- Message from Hardy Wood M.D. sent at 6/16/2020 12:40 PM PDT -----  Bangladeshi speaker please, please call patient to inform that his PSA was only mildly elevated.  I would like for him to follow-up with urology.  The referral has been sent.    Hardy Wood MD  [facility]  [address] [place] [id number]

## 2020-06-23 NOTE — TELEPHONE ENCOUNTER
Phone Number Called: [phone] (home)       Call outcome: Spoke to patient regarding message below.    Message: Spoke to pt and informed of lab results and informed of referral to urology pt understood.

## 2020-07-10 ENCOUNTER — NURSE TRIAGE (OUTPATIENT)
Dept: HEALTH INFORMATION MANAGEMENT | Facility: OTHER | Age: 65
End: 2020-07-10

## 2020-07-10 ENCOUNTER — APPOINTMENT (OUTPATIENT)
Dept: RADIOLOGY | Facility: IMAGING CENTER | Age: 65
End: 2020-07-10
Attending: NURSE PRACTITIONER
Payer: COMMERCIAL

## 2020-07-10 ENCOUNTER — OFFICE VISIT (OUTPATIENT)
Dept: URGENT CARE | Facility: CLINIC | Age: 65
End: 2020-07-10
Payer: COMMERCIAL

## 2020-07-10 VITALS
WEIGHT: 170 LBS | RESPIRATION RATE: 16 BRPM | OXYGEN SATURATION: 96 % | HEIGHT: 67 IN | SYSTOLIC BLOOD PRESSURE: 144 MMHG | HEART RATE: 68 BPM | BODY MASS INDEX: 26.68 KG/M2 | DIASTOLIC BLOOD PRESSURE: 66 MMHG | TEMPERATURE: 99.2 F

## 2020-07-10 DIAGNOSIS — G47.30 BREATHING-RELATED SLEEP DISORDER: ICD-10-CM

## 2020-07-10 DIAGNOSIS — R06.02 SOB (SHORTNESS OF BREATH): ICD-10-CM

## 2020-07-10 PROCEDURE — 99214 OFFICE O/P EST MOD 30 MIN: CPT | Performed by: NURSE PRACTITIONER

## 2020-07-10 PROCEDURE — 71046 X-RAY EXAM CHEST 2 VIEWS: CPT | Mod: TC,FY | Performed by: NURSE PRACTITIONER

## 2020-07-10 RX ORDER — SIMVASTATIN 20 MG
20 TABLET ORAL NIGHTLY
COMMUNITY
End: 2021-04-30

## 2020-07-10 RX ORDER — TRIAMTERENE AND HYDROCHLOROTHIAZIDE 37.5; 25 MG/1; MG/1
1 CAPSULE ORAL EVERY MORNING
COMMUNITY
End: 2021-04-30

## 2020-07-10 ASSESSMENT — FIBROSIS 4 INDEX: FIB4 SCORE: 1.63

## 2020-07-10 NOTE — TELEPHONE ENCOUNTER
1. Caller Name: Reyna                Call Back Number: [phone]  [facility] PCP or Specialty Provider: Yes         2.  In the last two weeks, has the patient had any new or worsening symptoms (not explained by alternative diagnosis)? Yes, the patient reports the following COVID-19 consistent symptoms: shortness of breath or difficulty breathing. Dry mouth, anxious. Palpitations.     3.  Does patient have any comoribidities? None     4.  Has the patient traveled in the last 14 days OR had any known contact with someone who is suspected or confirmed to have COVID-19?  No.    5. Disposition: Advised to go to     Note routed to [facility] Provider: Provider action needed: Pt says they received a referral to urology at least 3 weeks ago (not from Farrah but from another provider in this office) but has not received a call. Can you please check for this referral? Reports she has called urology and they say they have not received it. Thank you.

## 2020-07-12 ASSESSMENT — ENCOUNTER SYMPTOMS
DIAPHORESIS: 0
ORTHOPNEA: 1
COUGH: 0
INSOMNIA: 1
FATIGUE: 1

## 2020-07-12 NOTE — PROGRESS NOTES
Subjective:      Karsten Jaime is a 65 y.o. male who presents with Fatigue (x6 months worsening over time) and Shortness of Breath            Fatigue   This is a new problem. Episode onset: pt is accompanied by his wife who helps to serve as an interpretor. His wife states that he has been having trouble sleeping for the last 6 months. She states he will fall asleep only to wake moments later trying to take a breath. The problem occurs constantly. The problem has been unchanged. Associated symptoms include fatigue. Pertinent negatives include no coughing or diaphoresis. Associated symptoms comments: His wife reports that these wakings go on through the whole night and it is almost impossible for him to get any sleep. Now he is anxious about falling asleep because he knows he will be waking up several times per night. He is very tired during the day. His wife states that he looks short of breath when he wakes up after falling asleep because he looks to be gasping for air. Pt reports his heart starts to beat quickly and he is growing more concerned why he cannot stay asleep at night. Treatments tried: pt reports he has tried different beds in his house, elevating his head with pillows at night. The treatment provided no relief.       Review of Systems   Constitutional: Positive for fatigue and malaise/fatigue. Negative for diaphoresis.   Respiratory: Negative for cough.    Cardiovascular: Positive for orthopnea.   Psychiatric/Behavioral: The patient has insomnia.    All other systems reviewed and are negative.    Past Medical History:   Diagnosis Date   • GERD (gastroesophageal reflux disease)    • History of hiatal hernia 11/25/2019    S/p hiatal hernia repaired by Dr. Gu on July 12, 2019.   • Hyperlipidemia    • Hypertension    • IFG (impaired fasting glucose) 5/10/2016      Past Surgical History:   Procedure Laterality Date   • CATARACT PHACO WITH IOL Left 8/14/2017    Procedure: CATARACT PHACO WITH IOL;   Surgeon: Dwayne Ching M.D.;  Location: SURGERY SAME DAY [facility];  Service:    • CATARACT PHACO WITH IOL Right 7/24/2017    Procedure: CATARACT PHACO WITH IOL KPE WITH PLANO SUPERIOR;  Surgeon: Dwayne Ching M.D.;  Location: SURGERY SAME DAY [facility];  Service:    • GASTROSCOPY WITH BIOPSY  1/29/2014    Performed by Nick Salas M.D. at [facility]   • EGD W/ENDOSCOPIC ULTRASOUND  2/21/2013    Performed by Nick Salas M.D. at [facility]   • GASTROSCOPY WITH BIOPSY  2/21/2013    Performed by Nick Salas M.D. at [facility]   • [place] UPPER GI ENDOSCOPY,EXAM        Social History     Socioeconomic History   • Marital status:      Spouse name: Not on file   • Number of children: Not on file   • Years of education: Not on file   • Highest education level: Not on file   Occupational History   • Not on file   Social Needs   • Financial resource strain: Not on file   • Food insecurity     Worry: Not on file     Inability: Not on file   • Transportation needs     Medical: Not on file     Non-medical: Not on file   Tobacco Use   • Smoking status: Never Smoker   • Smokeless tobacco: Never Used   Substance and Sexual Activity   • Alcohol use: Yes     Frequency: 2-3 times a week     Drinks per session: 3 or 4     Binge frequency: Monthly     Comment: Occasionally   • Drug use: No   • Sexual activity: Yes     Partners: Female   Lifestyle   • Physical activity     Days per week: Not on file     Minutes per session: Not on file   • Stress: Not on file   Relationships   • Social connections     Talks on phone: Not on file     Gets together: Not on file     Attends Church service: Not on file     Active member of club or organization: Not on file     Attends meetings of clubs or organizations: Not on file     Relationship status: Not on file   • Intimate partner violence     Fear of current or ex partner: Not on file     Emotionally abused: Not on file      "Physically abused: Not on file     Forced sexual activity: Not on file   Other Topics Concern   • Not on file   Social History Narrative   • Not on file          Objective:     /66   Pulse 68   Temp 37.3 °C (99.2 °F) (Temporal)   Resp 16   Ht 1.702 m (5' 7\")   Wt 77.1 kg (170 lb)   SpO2 96%   BMI 26.63 kg/m²      Physical Exam  Vitals signs and nursing note reviewed.   Constitutional:       Appearance: Normal appearance. He is normal weight.   HENT:      Head: Normocephalic and atraumatic.      Nose: Nose normal.      Mouth/Throat:      Mouth: Mucous membranes are moist.   Eyes:      Extraocular Movements: Extraocular movements intact.      Pupils: Pupils are equal, round, and reactive to light.   Neck:      Musculoskeletal: Normal range of motion.   Cardiovascular:      Rate and Rhythm: Normal rate and regular rhythm.      Heart sounds: Normal heart sounds.   Pulmonary:      Effort: Pulmonary effort is normal.      Breath sounds: Normal breath sounds.   Musculoskeletal: Normal range of motion.   Skin:     General: Skin is warm and dry.      Capillary Refill: Capillary refill takes less than 2 seconds.   Neurological:      General: No focal deficit present.      Mental Status: He is alert and oriented to person, place, and time. Mental status is at baseline.      Cranial Nerves: Cranial nerves are intact.      Sensory: Sensation is intact.      Motor: Motor function is intact.      Coordination: Coordination is intact.   Psychiatric:         Mood and Affect: Mood normal.         Speech: Speech normal.         Thought Content: Thought content normal.         Judgment: Judgment normal.               7/10/2020 3:49 PM     HISTORY/REASON FOR EXAM:  Shortness of Breath when laying down for the past 3 months.        TECHNIQUE/EXAM DESCRIPTION AND NUMBER OF VIEWS:  Two views of the chest.     COMPARISON:  None available.     FINDINGS:        The mediastinal and cardiac silhouette is unremarkable.     The " pulmonary vascularity is within normal limits.     The lung parenchyma is clear.     There is no significant pleural effusion.     There is no visible pneumothorax.     There are no acute bony abnormalities.     IMPRESSION:     1.  Unremarkable two view chest.  Assessment/Plan:       1. SOB (shortness of breath)  - DX-CHEST-2 VIEWS; Future    2. Breathing-related sleep disorder  - REFERRAL TO PULMONARY AND SLEEP MEDICINE Sleep Medicine    Discussed with patient and his wife, symptoms sound c/w SURINDER. mallampati score of 3-4  DDX discussed include but are not limited to SURINDER, PND, orthopnea, platypnea  He will require a more thorough eval by sleep medicine to determine exact cause  At this time I am not concerned for a more serious etiology but red flags have been discussed and when he needs to present to the ER for further eval  Explained that he needs to try and sleep in an elevated position to keep his airway open and help him to breathe easier  Vitals are stable and reassuring  He understands and agrees with POC  Supportive care, differential diagnoses, and indications for immediate follow-up discussed with patient.    Pathogenesis of diagnosis discussed including typical length and natural progression.    Instructed to return to  or nearest emergency department if symptoms fail to improve, for any change in condition, further concerns, or new concerning symptoms.  Patient states understanding of the plan of care and discharge instructions.  Follow up with PCP in 1-2 weeks

## 2020-07-14 NOTE — TELEPHONE ENCOUNTER
Referral was authorized to [facility], please refax and call the office to confirm they have received this so that the patient can schedule. Thank you!

## 2020-07-17 DIAGNOSIS — J30.1 SEASONAL ALLERGIC RHINITIS DUE TO POLLEN: ICD-10-CM

## 2020-07-17 RX ORDER — FLUTICASONE PROPIONATE 50 MCG
2 SPRAY, SUSPENSION (ML) NASAL DAILY
Qty: 48 ML | Refills: 1 | Status: SHIPPED | OUTPATIENT
Start: 2020-07-17 | End: 2022-02-22

## 2020-07-19 PROBLEM — G47.33 OSA (OBSTRUCTIVE SLEEP APNEA): Status: ACTIVE | Noted: 2020-07-19

## 2020-07-19 NOTE — PROGRESS NOTES
CC: Evaluation of possible obstructive sleep apnea syndrome    HPI:  Karsten Jaime is a 65 y.o.  for [facility] kindly referred by HATTIE Olson for evaluation of fatigue.  Ms. Gamez elicited a history of difficulty sleeping for 6 months, nocturnal shortness of breath, fatigue, daytime somnolence, and nocturnal gasping.  The patient is reportedly tried different beds and elevating his head with pillows to no avail.    His sleep questionnaire indicates poor sleep quality, insufficient sleep, nightmares, and night terrors for about a 6-month period of time.  He reports sleep onset insomnia, witnessed apneas, snoring, nocturnal shortness of breath aggravated by the supine position, resuscitative snorts, and daytime sleepiness.  Despite this his total [place] score is normal 6 out of 24.    Usually sleeps on his right side.      He usually goes to bed at midnight and may not fall asleep for 2 to 3 hours.  He ultimately gets up at about 7 AM.  He reports 3-4 nocturnal awakenings.  Sleep is non-refreshing but he does not nap during the day.  He has a regular bed partner but no pets in the room.        Significant comorbidities and modifying factors include impaired fasting glucose, GERD, hyperlipidemia, hypertension, never smoker, benign tumor of the cardia of stomach, elevated PSA seasonal rhinitis, and overweight.      Patient Active Problem List    Diagnosis Date Noted   • SURINDER (obstructive sleep apnea) 07/19/2020   • Seasonal allergic rhinitis due to pollen 05/10/2016   • Pure hypercholesterolemia 02/19/2016   • Essential hypertension 07/23/2015   • Benign tumor of cardia of stomach 01/29/2014       Past Medical History:   Diagnosis Date   • GERD (gastroesophageal reflux disease)    • History of hiatal hernia 11/25/2019    S/p hiatal hernia repaired by Dr. Gu on July 12, 2019.   • Hyperlipidemia    • Hypertension    • IFG (impaired fasting glucose) 5/10/2016         Past Surgical History:   Procedure Laterality Date   • CATARACT PHACO WITH IOL Left 8/14/2017    Procedure: CATARACT PHACO WITH IOL;  Surgeon: Dwayne Ching M.D.;  Location: SURGERY SAME DAY [facility];  Service:    • CATARACT PHACO WITH IOL Right 7/24/2017    Procedure: CATARACT PHACO WITH IOL KPE WITH PLANO SUPERIOR;  Surgeon: Dwayne Ching M.D.;  Location: SURGERY SAME DAY [facility];  Service:    • GASTROSCOPY WITH BIOPSY  1/29/2014    Performed by Nick Salas M.D. at [facility]   • EGD W/ENDOSCOPIC ULTRASOUND  2/21/2013    Performed by Nick Salas M.D. at [facility]   • GASTROSCOPY WITH BIOPSY  2/21/2013    Performed by Nick Salas M.D. at [facility]   • [place] UPPER GI ENDOSCOPY,EXAM         Family History   Problem Relation Age of Onset   • Genetic Disorder Mother 75        GERD   • Diabetes Father    • Hypertension Brother    • Diabetes Brother        Social History     Socioeconomic History   • Marital status:      Spouse name: Not on file   • Number of children: Not on file   • Years of education: Not on file   • Highest education level: Not on file   Occupational History   • Not on file   Social Needs   • Financial resource strain: Not on file   • Food insecurity     Worry: Not on file     Inability: Not on file   • Transportation needs     Medical: Not on file     Non-medical: Not on file   Tobacco Use   • Smoking status: Never Smoker   • Smokeless tobacco: Never Used   Substance and Sexual Activity   • Alcohol use: Yes     Frequency: 2-3 times a week     Drinks per session: 3 or 4     Binge frequency: Monthly     Comment: Occasionally   • Drug use: No   • Sexual activity: Yes     Partners: Female   Lifestyle   • Physical activity     Days per week: Not on file     Minutes per session: Not on file   • Stress: Not on file   Relationships   • Social connections     Talks on phone: Not on file     Gets together: Not on file     Attends  "Congregational service: Not on file     Active member of club or organization: Not on file     Attends meetings of clubs or organizations: Not on file     Relationship status: Not on file   • Intimate partner violence     Fear of current or ex partner: Not on file     Emotionally abused: Not on file     Physically abused: Not on file     Forced sexual activity: Not on file   Other Topics Concern   • Not on file   Social History Narrative   • Not on file       Current Outpatient Medications   Medication Sig Dispense Refill   • zolpidem (AMBIEN) 5 MG Tab Take 1 Tab by mouth at bedtime as needed for Sleep (1 to 2 po qhs prn insomnia/sleep study. Bring to sleep study.) for up to 2 doses. 2 Tab 0   • fluticasone (FLONASE) 50 MCG/ACT nasal spray SPRAY 2 SPRAYS IN NOSE EVERY DAY. 48 mL 1   • simvastatin (ZOCOR) 20 MG Tab Take 20 mg by mouth every evening.     • triamterene/hctz (MAXZIDE-25/DYAZIDE) 37.5-25 MG Cap Take 1 Cap by mouth every morning.     • Dexlansoprazole (DEXILANT) 60 MG CAPSULE DELAYED RELEASE delayed-release capsule Take 60 mg by mouth.     • losartan (COZAAR) 50 MG Tab Take 1 Tab by mouth every day. 90 Tab 3   • Omega-3 Fatty Acids (OMEGA-3 FISH OIL PO) Take 1 Cap by mouth every day.       No current facility-administered medications for this visit.     \"CURRENT RX\"    ALLERGIES: Padmini-seltzer [aspirin effervescent]; Aspirin; and Pollen extract    ROS    Constitutional: Positive for fatigue and malaise  Eyes: Denies vision loss, pain, drainage, double vision, glasses.  Ears/Nose/Mouth/Throat: Denies earache, difficulty hearing, rhinitis/nasal congestion, injury, recurrent sore throat, persistent hoarseness, decayed teeth/toothaches, ringing or buzzing in the ears.  Cardiovascular: Positive for orthopnea  Respiratory: Denies shortness of breath, cough, sputum, wheezing, painful breathing, coughing up blood.   Sleep: per HPI  Gastrointestinal: Denies  difficulty swallowing, nausea, abdominal pain, diarrhea, " "constipation, heartburn.  Genitourinary: Denies  blood in urine, discharge, frequent urination.   Musculoskeletal: Denies painful joints, sore muscles, back pain.   Integumentary: Denies rashes, lumps, color changes.   Neurological: Denies frequent headaches,weakness, dizziness.    PHYSICAL EXAM  Near normal BMI    /70 (BP Location: Right arm, Patient Position: Sitting, BP Cuff Size: Adult)   Pulse 73   Resp 16   Ht 1.753 m (5' 9\")   Wt 78.5 kg (173 lb)   SpO2 97%   BMI 25.55 kg/m²   Appearance: Well-nourished, well-developed, no acute distress  Eyes:  PERRLA, EOMI  ENMT: Masked  Neck: Supple, trachea midline, no masses  Respiratory effort:  No intercostal retractions or use of accessory muscles  Lung auscultation:  No wheezes rhonchi rubs or rales  Cardiac: No murmurs, rubs, or gallops; regular rhythm, normal rate; no edema  Abdomen:  No tenderness, no organomegaly  Musculoskeletal:  Grossly normal; gait and station normal; digits and nails normal  Skin:  No rashes, petechiae, cyanosis  Neurologic: without focal signs; oriented to person, time, place, and purpose; judgement intact  Psychiatric:  No depression, anxiety, agitation        PROBLEMS:    1. SURINDER (obstructive sleep apnea)  - Polysomnography, 4 or More; Future  - zolpidem (AMBIEN) 5 MG Tab; Take 1 Tab by mouth at bedtime as needed for Sleep (1 to 2 po qhs prn insomnia/sleep study. Bring to sleep study.) for up to 2 doses.  Dispense: 2 Tab; Refill: 0    2. Essential hypertension    3. Benign tumor of cardia of stomach    4. Seasonal allergic rhinitis due to pollen    5. Pure hypercholesterolemia      PLAN:   The patient has signs and symptoms consistent with obstructive sleep apnea hypopnea syndrome. Will schedule to have a nocturnal polysomnogram using zolpidem to assist with sleep onset and maintenance should the need arise. Will return after the results are available to determine further diagnostic needs and/or treatment options.    The risks " of untreated sleep apnea were discussed with the patient at length. Patients with SURINDER are at increased risk of cardiovascular disease including coronary artery disease, systemic arterial hypertension, pulmonary arterial hypertension, cardiac arrythmias, and stroke. SURINDER patients have an increased risk of motor vehicle accidents, type 2 diabetes, chronic kidney disease, and non-alcoholic liver disease. The patient was advised to avoid driving a motor vehicle when drowsy.    Positive airway pressure, such as CPAP, is considered first-line and preferred therapy for sleep apnea and may reverse both symptoms and risks.    Have advised the patient to follow up with the appropriate healthcare practitioners for all other medical problems and issues.      Return for after sleep study.

## 2020-07-20 ENCOUNTER — SLEEP CENTER VISIT (OUTPATIENT)
Dept: SLEEP MEDICINE | Facility: MEDICAL CENTER | Age: 65
End: 2020-07-20
Payer: COMMERCIAL

## 2020-07-20 VITALS
RESPIRATION RATE: 16 BRPM | WEIGHT: 173 LBS | DIASTOLIC BLOOD PRESSURE: 70 MMHG | SYSTOLIC BLOOD PRESSURE: 110 MMHG | BODY MASS INDEX: 25.62 KG/M2 | OXYGEN SATURATION: 97 % | HEART RATE: 73 BPM | HEIGHT: 69 IN

## 2020-07-20 DIAGNOSIS — D13.1: ICD-10-CM

## 2020-07-20 DIAGNOSIS — G47.33 OSA (OBSTRUCTIVE SLEEP APNEA): ICD-10-CM

## 2020-07-20 DIAGNOSIS — J30.1 SEASONAL ALLERGIC RHINITIS DUE TO POLLEN: ICD-10-CM

## 2020-07-20 DIAGNOSIS — I10 ESSENTIAL HYPERTENSION: ICD-10-CM

## 2020-07-20 DIAGNOSIS — E78.00 PURE HYPERCHOLESTEROLEMIA: ICD-10-CM

## 2020-07-20 PROCEDURE — 99204 OFFICE O/P NEW MOD 45 MIN: CPT | Performed by: INTERNAL MEDICINE

## 2020-07-20 RX ORDER — ZOLPIDEM TARTRATE 5 MG/1
5 TABLET ORAL NIGHTLY PRN
Qty: 2 TAB | Refills: 0 | Status: SHIPPED | OUTPATIENT
Start: 2020-07-20 | End: 2020-08-20

## 2020-07-20 ASSESSMENT — FIBROSIS 4 INDEX: FIB4 SCORE: 1.63

## 2020-08-22 ENCOUNTER — SLEEP STUDY (OUTPATIENT)
Dept: SLEEP MEDICINE | Facility: MEDICAL CENTER | Age: 65
End: 2020-08-22
Attending: INTERNAL MEDICINE
Payer: COMMERCIAL

## 2020-08-22 DIAGNOSIS — G47.33 OSA (OBSTRUCTIVE SLEEP APNEA): ICD-10-CM

## 2020-08-24 NOTE — PROCEDURES
Comments:  The patient underwent a diagnostic polysomnogram using the standard montage for measurement of parameters of sleep, respiratory events, movement abnormalities, and heart rate and rhythm.   A microphone was used to monitor snoring.  Interpretation:  Study start time was 10:27:00 PM. Diagnostic recording time was 7h 18.0m with a total sleep time of 3h 58.5m resulting in a sleep efficiency of 54.45%%.   Sleep latency from the start of the study was 74 minutes and the latency from sleep to REM was 00 minutes.  In total,77 arousals were scored for an arousal index of 19.4.  Respiratory:  There were a total of 6 apneas consisting of 6 obstructive apneas, 0 mixed apneas, and 0 central apneas. A total of 70 hypopneas were scored.  The apnea index was 1.51 per hour and the hypopnea index was 17.61 per hour resulting in an overall AHI of 19.12.  AHI during rem was 0.0 and AHI while supine was 19.30.  Oximetry:  There was a mean oxygen saturation of 94.0% with a minimum oxygen saturation of 82.0%. Time spent with oxygen saturations below 89% was 7.7 minutes.  Cardiac:  The highest heart rate seen while awake was 99 BPM while the highest heart rate during sleep was 77 BPM with an average sleeping heart rate of 60 BPM.  Limb Movements:  There were a total of 357 PLMs during sleep, of which 23 were PLMS arousals. This resulted in a PLMS index of 89.8 and a PLMS arousal index of 5.8.    RECORDING TECHNIQUE:       After the scalp was prepared, gold plated electrodes were applied to the scalp according to the International 10-20 System. EEG (electroencephalogram) was continuously monitored from the O1-M2, O2-M1, C3-M2, C4-M1, F3-M2, and F4-M1.   EOGs (electrooculograms) were monitored by electrodes placed at the left and right outer canthi.  Chin EMG (electromyogram) was monitored by electrodes placed on the mentalis and sub-mentalis muscles.  Nasal and oral airflow were monitored using a triple port thermocouple as well  as oronasal pressure transducer.  Respiratory effort was measured by inductive plethysmography technology employing abdominal and thoracic belts.  Blood oxygen saturation and pulse were monitored by pulse oximetry.  Heart rhythm was monitored by surface electrocardiogram.  Leg EMG was studied using surface electrodes placed on left and right anterior tibialis.  A microphone was used to monitor tracheal sounds and snoring.  Body position was monitored and documented by technician observation      SUMMARY:    This was an overnight diagnostic polysomnogram with a possible subsequent positive airway pressure titration.  However, the patient did not meet the split-night protocol.    The total recording time was 438 minutes, the sleep period time was 363 minutes, and the total sleep time was 238 minutes.  The patient's sleep efficiency was 54.45 % which is reduced.  The patient experienced 0 REM period(s).    The sleep stage durations revealed 125 minutes of wake after sleep onset (WASO), 40 minutes of N1 sleep, 198.5 minutes of N2 sleep, 0 minutes of N3 sleep, and 0 minutes of REM.    The latency to sleep was 74 minutes which is prolonged.  The latency to REM was not applicable as patient did not achieve REM.   Moderate sleep fragmentation occurred.  The arousal index was 19.4.  The Total Limb Movement (isolated) Index was 5.5, the Limb Movement with Arousal Index was 6.0, and the PLM Series Index was 87.5.    The patient experienced 0 central and 6 obstructive apneas, 0 central and 70 obstructive hypopneas, 76 apneas and hypopneas, and 8 RERAS.  The apnea hypopnea index was 19.1, the RDI was 21.1, the mean event duration was 23.7 seconds, and the longest event lasted 48.6 seconds.  The REM index was 0 and the supine index was 21.4.  The apnea hypopnea index represents moderate obstructive sleep apnea hypopnea syndrome.    The tosha saturation during sleep was 82 % and the patient spent 2.2 % of the recording with  saturations less than or equal to 90%.    During sleep, the minimum heart rate was 52 bpm, the mean heart rate was 60 bpm, and the maximum heart rate was 77 bpm.      ASSESSMENT:    Moderate obstructive sleep apnea hypopnea - AHI 19.1, RDI 21.1  Mild nocturnal desaturation - tosha saturation 82 % - saturations <90% for 2.2% of the recording        RECOMMENDATION:    Recommend a PAP  titration.

## 2020-08-27 PROCEDURE — 95810 POLYSOM 6/> YRS 4/> PARAM: CPT | Performed by: INTERNAL MEDICINE

## 2020-10-26 ENCOUNTER — OFFICE VISIT (OUTPATIENT)
Dept: MEDICAL GROUP | Age: 65
End: 2020-10-26
Payer: COMMERCIAL

## 2020-10-26 VITALS
HEIGHT: 67 IN | SYSTOLIC BLOOD PRESSURE: 128 MMHG | OXYGEN SATURATION: 98 % | TEMPERATURE: 96.7 F | BODY MASS INDEX: 27.09 KG/M2 | DIASTOLIC BLOOD PRESSURE: 72 MMHG | WEIGHT: 172.6 LBS | HEART RATE: 70 BPM

## 2020-10-26 DIAGNOSIS — G25.81 RESTLESS LEG SYNDROME: ICD-10-CM

## 2020-10-26 DIAGNOSIS — G47.33 OSA (OBSTRUCTIVE SLEEP APNEA): ICD-10-CM

## 2020-10-26 PROBLEM — R97.20 RAISED PROSTATE SPECIFIC ANTIGEN: Status: ACTIVE | Noted: 2020-07-30

## 2020-10-26 PROCEDURE — 99213 OFFICE O/P EST LOW 20 MIN: CPT | Performed by: PHYSICIAN ASSISTANT

## 2020-10-26 RX ORDER — TAMSULOSIN HYDROCHLORIDE 0.4 MG/1
CAPSULE ORAL
COMMUNITY
End: 2020-10-26

## 2020-10-26 RX ORDER — ROPINIROLE 0.25 MG/1
0.25 TABLET, FILM COATED ORAL NIGHTLY PRN
Qty: 30 TAB | Refills: 1 | Status: SHIPPED | OUTPATIENT
Start: 2020-10-26 | End: 2020-11-17

## 2020-10-26 RX ORDER — TAMSULOSIN HYDROCHLORIDE 0.4 MG/1
CAPSULE ORAL
COMMUNITY
Start: 2020-10-21 | End: 2022-06-16

## 2020-10-26 ASSESSMENT — FIBROSIS 4 INDEX: FIB4 SCORE: 1.63

## 2020-10-26 NOTE — PROGRESS NOTES
"cc: Restless legs    Subjective:     HPI    Restless legs  Karsten Jaime is a 65 y.o. male presenting with concerns of restless legs.  He states that he has had restless leg syndrome for over 20 years.  However, it does come and go.  Recently he has been having symptoms of restless leg the past few months.  He states that he feels his legs will just not settle at night.  They do intermittently feel achy, only at night.  He has no pain in his lower extremities during the day.  He does wake up at night.  Of note he was evaluated by sleep studies, diagnosed with SURINDER.  He is not tolerating the mask.  Denies lower extremity edema, claudication symptoms, erythema, warmth, dizziness, chest pain, palpitations, shortness of breath.        Review of systems:  See above.       Current Outpatient Medications:   •  tamsulosin (FLOMAX) 0.4 MG capsule, TAKE 1 CAPSULE BY MOUTH EVERYDAY AT BEDTIME, Disp: , Rfl:   •  ROPINIRole (REQUIP) 0.25 MG Tab, Take 1 Tab by mouth at bedtime as needed for up to 30 days., Disp: 30 Tab, Rfl: 1  •  fluticasone (FLONASE) 50 MCG/ACT nasal spray, SPRAY 2 SPRAYS IN NOSE EVERY DAY., Disp: 48 mL, Rfl: 1  •  simvastatin (ZOCOR) 20 MG Tab, Take 20 mg by mouth every evening., Disp: , Rfl:   •  triamterene/hctz (MAXZIDE-25/DYAZIDE) 37.5-25 MG Cap, Take 1 Cap by mouth every morning., Disp: , Rfl:   •  losartan (COZAAR) 50 MG Tab, Take 1 Tab by mouth every day., Disp: 90 Tab, Rfl: 3  •  Omega-3 Fatty Acids (OMEGA-3 FISH OIL PO), Take 1 Cap by mouth every day., Disp: , Rfl:     Allergies, past medical history, past surgical history, family history, social history reviewed and updated    Objective:     Vitals: /72 (BP Location: Left arm, Patient Position: Sitting, BP Cuff Size: Adult)   Pulse 70   Temp 35.9 °C (96.7 °F) (Temporal)   Ht 1.702 m (5' 7\")   Wt 78.3 kg (172 lb 9.6 oz)   SpO2 98%   BMI 27.03 kg/m²   General: Alert, pleasant, NAD  HEENT: Normocephalic. Neck supple.  No thyromegaly or " masses palpated. No cervical or supraclavicular lymphadenopathy. No carotid bruits   Heart: Regular rate and rhythm.  S1 and S2 normal.  No murmurs appreciated.  Respiratory: Normal respiratory effort.  Clear to auscultation bilaterally.  Skin: Warm, dry, no rashes.  Extremities: No leg edema.  Negative Homans sign.  No calf tenderness.  Radial pulses 2+ symmetric  Psych:  Affect/mood is normal, judgement is good, memory is intact, grooming is appropriate.    Assessment/Plan:       Diagnoses and all orders for this visit:    Restless leg syndrome  -Has had intermittent symptoms for over 20 years.  Will start on trial of droperidol.  Medication side effects reviewed.  We will follow-up in 4 weeks  -     ROPINIRole (REQUIP) 0.25 MG Tab; Take 1 Tab by mouth at bedtime as needed for up to 30 days.    SURINDER (obstructive sleep apnea)  -He is not tolerating the mask.  Advised to follow-up with sleep medicine.      Return in about 4 weeks (around 11/23/2020) for Restless leg syndrome.

## 2020-11-12 ENCOUNTER — OFFICE VISIT (OUTPATIENT)
Dept: SLEEP MEDICINE | Facility: MEDICAL CENTER | Age: 65
End: 2020-11-12
Payer: COMMERCIAL

## 2020-11-12 VITALS
OXYGEN SATURATION: 96 % | HEART RATE: 69 BPM | WEIGHT: 172 LBS | BODY MASS INDEX: 27 KG/M2 | RESPIRATION RATE: 16 BRPM | HEIGHT: 67 IN | DIASTOLIC BLOOD PRESSURE: 68 MMHG | SYSTOLIC BLOOD PRESSURE: 130 MMHG

## 2020-11-12 DIAGNOSIS — I10 ESSENTIAL HYPERTENSION: ICD-10-CM

## 2020-11-12 DIAGNOSIS — G47.33 OSA (OBSTRUCTIVE SLEEP APNEA): ICD-10-CM

## 2020-11-12 DIAGNOSIS — Z78.9 NONSMOKER: ICD-10-CM

## 2020-11-12 DIAGNOSIS — G25.81 RESTLESS LEG SYNDROME: ICD-10-CM

## 2020-11-12 PROCEDURE — 99214 OFFICE O/P EST MOD 30 MIN: CPT | Performed by: NURSE PRACTITIONER

## 2020-11-12 ASSESSMENT — FIBROSIS 4 INDEX: FIB4 SCORE: 1.63

## 2020-11-12 NOTE — PROGRESS NOTES
Chief Complaint   Patient presents with   • Results     SS        HPI:  Karsten Jaime is a 65 y.o. year old male here today for follow-up on sleep study results.  He is accompanied by his wife.  Last OV 7/20/2020 with Dr. Santillan.    Patient was originally referred for fatigue and difficulty with sleep for greater than 6 months with shortness of breath at night, nocturnal gasping and daytime somnolence.  PSG indicated a sleep efficiency of 54.45% and did not achieve split night due to late onset sleep, moderate SURINDER with AHI of 19.1, RDI 21.1/h and mild nocturnal desaturation with O2 tosha of 82%.  Patient spent 2.2% of recording time with a 90% ox saturation.  PLMS index 87.5.  He does have a history of restless legs and already on ropinirole but notes only taking as needed with minimal benefit.  Treatment of sleep apnea may further improve this for patient.  I reviewed study with patient treatment modalities.  He is amenable to starting auto CPAP.  He is apprehensive about mask.  He did not tolerate the fitting during his sleep study.  We reviewed the difference of mass and requirements for initiating therapy.  He denies cardiac or respiratory symptoms today.  He denies recurrent sinus infections or congestion.  He notes having history of stomach issues and pending follow-up with GI and also having complaints of stomach issues at night currently.  He is unsure if this is related to sleep apnea.    ROS: As per HPI and otherwise negative if not stated.    Past Medical History:   Diagnosis Date   • GERD (gastroesophageal reflux disease)    • History of hiatal hernia 11/25/2019    S/p hiatal hernia repaired by Dr. Gu on July 12, 2019.   • Hyperlipidemia    • Hypertension    • IFG (impaired fasting glucose) 5/10/2016       Past Surgical History:   Procedure Laterality Date   • CATARACT PHACO WITH IOL Left 8/14/2017    Procedure: CATARACT PHACO WITH IOL;  Surgeon: Dwayne Ching M.D.;  Location: SURGERY SAME  DAY [facility] ORS;  Service:    • CATARACT PHACO WITH IOL Right 7/24/2017    Procedure: CATARACT PHACO WITH IOL KPE WITH PLANO SUPERIOR;  Surgeon: Dwayne Ching M.D.;  Location: SURGERY SAME DAY [facility] ORS;  Service:    • GASTROSCOPY WITH BIOPSY  1/29/2014    Performed by Nick Salas M.D. at [facility]   • EGD W/ENDOSCOPIC ULTRASOUND  2/21/2013    Performed by Nick Salas M.D. at [facility]   • GASTROSCOPY WITH BIOPSY  2/21/2013    Performed by Nick Salas M.D. at [facility]   • [place] [facility] ENDOSCOPY,EXAM         Family History   Problem Relation Age of Onset   • Genetic Disorder Mother 75        GERD   • Diabetes Father    • Hypertension Brother    • Diabetes Brother        Social History     Socioeconomic History   • Marital status:      Spouse name: Not on file   • Number of children: Not on file   • Years of education: Not on file   • Highest education level: Not on file   Occupational History   • Not on file   Social Needs   • Financial resource strain: Not on file   • Food insecurity     Worry: Not on file     Inability: Not on file   • Transportation needs     Medical: Not on file     Non-medical: Not on file   Tobacco Use   • Smoking status: Never Smoker   • Smokeless tobacco: Never Used   Substance and Sexual Activity   • Alcohol use: Yes     Frequency: 2-3 times a week     Drinks per session: 3 or 4     Binge frequency: Monthly     Comment: Occasionally   • Drug use: No   • Sexual activity: Yes     Partners: Female   Lifestyle   • Physical activity     Days per week: Not on file     Minutes per session: Not on file   • Stress: Not on file   Relationships   • Social connections     Talks on phone: Not on file     Gets together: Not on file     Attends Taoist service: Not on file     Active member of club or organization: Not on file     Attends meetings of clubs or organizations: Not on file     Relationship status: Not on file   • Intimate  "partner violence     Fear of current or ex partner: Not on file     Emotionally abused: Not on file     Physically abused: Not on file     Forced sexual activity: Not on file   Other Topics Concern   • Not on file   Social History Narrative   • Not on file       Allergies as of 11/12/2020 - Reviewed 11/12/2020   Allergen Reaction Noted   • Padmini-seltzer [aspirin effervescent]  11/30/2010   • Aspirin  11/30/2010   • Padmini-seltzer plus cold-cough  10/26/2020   • Pollen extract  03/31/2020        Vitals:  /68 (BP Location: Left arm, Patient Position: Sitting, BP Cuff Size: Adult)   Pulse 69   Resp 16   Ht 1.702 m (5' 7\")   Wt 78 kg (172 lb)   SpO2 96%     Current medications as of today   Current Outpatient Medications   Medication Sig Dispense Refill   • tamsulosin (FLOMAX) 0.4 MG capsule TAKE 1 CAPSULE BY MOUTH EVERYDAY AT BEDTIME     • ROPINIRole (REQUIP) 0.25 MG Tab Take 1 Tab by mouth at bedtime as needed for up to 30 days. 30 Tab 1   • fluticasone (FLONASE) 50 MCG/ACT nasal spray SPRAY 2 SPRAYS IN NOSE EVERY DAY. 48 mL 1   • simvastatin (ZOCOR) 20 MG Tab Take 20 mg by mouth every evening.     • triamterene/hctz (MAXZIDE-25/DYAZIDE) 37.5-25 MG Cap Take 1 Cap by mouth every morning.     • losartan (COZAAR) 50 MG Tab Take 1 Tab by mouth every day. 90 Tab 3   • Omega-3 Fatty Acids (OMEGA-3 FISH OIL PO) Take 1 Cap by mouth every day.       No current facility-administered medications for this visit.          Physical Exam:   Gen:           Alert and oriented, No apparent distress. Mood and affect appropriate, normal interaction with examiner.  Eyes:          PERRL, EOM intact, sclere white, conjunctive moist.  Ears:          Not examined.   Hearing:     Grossly intact.  Nose:          Normal, no lesions or deformities.  Dentition:    Good dentition.  Oropharynx:   mask  Mallampati Classification: mask  Neck:        Supple, trachea midline, no masses.  Respiratory Effort: No intercostal retractions or use of " accessory muscles.   Lung Auscultation:      Clear to auscultation bilaterally; no rales, rhonchi or wheezing.  CV:            Regular rate and rhythm. No murmurs, rubs or gallops.  Abd:           Not examined.   Lymphadenopathy: Not examined.  Gait and Station: Normal.  Digits and Nails: No clubbing, cyanosis, petechiae, or nodes.   Cranial Nerves: II-XII grossly intact.  Skin:        No rashes, lesions or ulcers noted.               Ext:           No cyanosis or edema.      Assessment:  1. SURINDER (obstructive sleep apnea)     2. Restless leg syndrome     3. Essential hypertension     4. BMI 26.0-26.9,adult     5. Nonsmoker         Immunizations:    Flu:9/2020  Pneumovax 23:not due  Prevnar 13:not due    Plan:  1.  DME CPAP 4 to 10 cm nightly.  Patient understands they may have mask fits within first 30 days of therapy covered by insurance to obtain best fit.  Patient understands the need to use device every night for >4hrs to meet compliance standards for insurance purposes.  2.  Discussed sleep hygiene.  3.  Continue to monitor RLS symptoms after initiation of sleep apnea therapy.  4.  For primary care for the health concerns.  5.  Follow-up with GI for ongoing stomach issues.  We will also monitor this to see if this improves with sleep apnea treatment.  6.  Follow-up in 2 months for first compliance check, sooner if needed.  Encourage patient and her wife to contact the office immediately if having any issues.  Also discussed the need for mask fit if he has difficulty using nasal mask and to follow-up with respiratory therapist to DME.    Please note that this dictation was created using voice recognition software. I have made every reasonable attempt to correct obvious errors, but it is possible there are errors of grammar and possibly content that I did not discover before finalizing the note.

## 2020-11-12 NOTE — PATIENT INSTRUCTIONS
Patient understands the need to use device every night for >4hrs to meet compliance standards for insurance purposes.    Patient understands they may have mask fits within first 30 days of therapy covered by insurance to obtain best fit.

## 2020-11-17 DIAGNOSIS — G25.81 RESTLESS LEG SYNDROME: ICD-10-CM

## 2020-11-17 RX ORDER — ROPINIROLE 0.25 MG/1
0.25 TABLET, FILM COATED ORAL NIGHTLY PRN
Qty: 30 TAB | Refills: 1 | Status: SHIPPED | OUTPATIENT
Start: 2020-11-17 | End: 2020-11-23 | Stop reason: SDUPTHER

## 2020-11-23 ENCOUNTER — OFFICE VISIT (OUTPATIENT)
Dept: MEDICAL GROUP | Age: 65
End: 2020-11-23
Payer: COMMERCIAL

## 2020-11-23 VITALS
BODY MASS INDEX: 27.34 KG/M2 | DIASTOLIC BLOOD PRESSURE: 60 MMHG | TEMPERATURE: 97.3 F | WEIGHT: 174.2 LBS | HEART RATE: 66 BPM | OXYGEN SATURATION: 96 % | HEIGHT: 67 IN | SYSTOLIC BLOOD PRESSURE: 126 MMHG

## 2020-11-23 DIAGNOSIS — G25.81 RESTLESS LEG SYNDROME: ICD-10-CM

## 2020-11-23 PROCEDURE — 99213 OFFICE O/P EST LOW 20 MIN: CPT | Performed by: PHYSICIAN ASSISTANT

## 2020-11-23 RX ORDER — ROPINIROLE 0.25 MG/1
0.25 TABLET, FILM COATED ORAL NIGHTLY PRN
Qty: 30 TAB | Refills: 1 | Status: SHIPPED | OUTPATIENT
Start: 2020-11-23 | End: 2020-12-23

## 2020-11-23 ASSESSMENT — FIBROSIS 4 INDEX: FIB4 SCORE: 1.63

## 2020-11-23 NOTE — ASSESSMENT & PLAN NOTE
Karsten presents for follow-up restless leg.  He was started on 0.25 mg of Requip.  He states that he only noticed minimal improvement.  He has also been evaluated by sleep studies.  Has confirmed sleep apnea and is awaiting his CPAP.  He is hoping that once his sleep apnea is treated his restless leg will improve.  He does not want to try increasing medications at this time.  Denies muscle cramps, weakness, numbness/tingling, fever, chills, cough.

## 2020-11-23 NOTE — PROGRESS NOTES
"cc: Follow-up restless leg    Subjective:     HPI    Restless leg syndrome  Karsten presents for follow-up restless leg.  He was started on 0.25 mg of Requip.  He states that he only noticed minimal improvement.  He has also been evaluated by sleep studies.  Has confirmed sleep apnea and is awaiting his CPAP.  He is hoping that once his sleep apnea is treated his restless leg will improve.  He does not want to try increasing medications at this time.  Denies muscle cramps, weakness, numbness/tingling, fever, chills, cough.      Review of systems:  See above.       Current Outpatient Medications:   •  influenza vaccine quad 0.5 ML Suspension Prefilled Syringe injection, Fluzone Quad 8826-6979 (PF) 60 mcg (15 mcg x 4)/0.5 mL IM syringe, Disp: , Rfl:   •  ROPINIRole (REQUIP) 0.25 MG Tab, Take 1 Tab by mouth at bedtime as needed for up to 30 days., Disp: 30 Tab, Rfl: 1  •  tamsulosin (FLOMAX) 0.4 MG capsule, TAKE 1 CAPSULE BY MOUTH EVERYDAY AT BEDTIME, Disp: , Rfl:   •  fluticasone (FLONASE) 50 MCG/ACT nasal spray, SPRAY 2 SPRAYS IN NOSE EVERY DAY., Disp: 48 mL, Rfl: 1  •  simvastatin (ZOCOR) 20 MG Tab, Take 20 mg by mouth every evening., Disp: , Rfl:   •  triamterene/hctz (MAXZIDE-25/DYAZIDE) 37.5-25 MG Cap, Take 1 Cap by mouth every morning., Disp: , Rfl:   •  losartan (COZAAR) 50 MG Tab, Take 1 Tab by mouth every day., Disp: 90 Tab, Rfl: 3  •  Omega-3 Fatty Acids (OMEGA-3 FISH OIL PO), Take 1 Cap by mouth every day., Disp: , Rfl:     Allergies, past medical history, past surgical history, family history, social history reviewed and updated    Objective:     Vitals: /60 (BP Location: Left arm, Patient Position: Sitting, BP Cuff Size: Adult)   Pulse 66   Temp 36.3 °C (97.3 °F) (Temporal)   Ht 1.702 m (5' 7\")   Wt 79 kg (174 lb 3.2 oz)   SpO2 96%   BMI 27.28 kg/m²   General: Alert, pleasant, NAD  HEENT: No carotid bruits   Heart: Regular rate and rhythm.  S1 and S2 normal.  No murmurs " appreciated.  Respiratory: Normal respiratory effort.  Clear to auscultation bilaterally.  Skin: Warm, dry, no rashes.  Extremities: No leg edema.  Radial pulses 2+ symmetric  Psych:  Affect/mood is normal, judgement is good, memory is intact, grooming is appropriate.    Assessment/Plan:     Karsten was seen today for sleep problem.    Diagnoses and all orders for this visit:    Restless leg syndrome  -Did not feel that the Requip was  effective.  He would like to try treatment with CPAP for sleep apnea first.  Discussed if he is still having symptoms of restless leg once he has been on his CPAP, can start 0.25 mg of Requip again, if minimal improvement increase to 2 tabs-0.5 mg.  Follow-up if no improvement.  - ROPINIRole (REQUIP) 0.25 MG Tab; Take 1 Tab by mouth at bedtime as needed for up to 30 days.  Dispense: 30 Tab; Refill: 1    Return in about 7 months (around 6/23/2021) for Annual PX.

## 2021-01-18 ENCOUNTER — OFFICE VISIT (OUTPATIENT)
Dept: SLEEP MEDICINE | Facility: MEDICAL CENTER | Age: 66
End: 2021-01-18
Payer: COMMERCIAL

## 2021-01-18 VITALS
HEIGHT: 67 IN | OXYGEN SATURATION: 98 % | BODY MASS INDEX: 27 KG/M2 | RESPIRATION RATE: 16 BRPM | HEART RATE: 71 BPM | SYSTOLIC BLOOD PRESSURE: 118 MMHG | DIASTOLIC BLOOD PRESSURE: 64 MMHG | WEIGHT: 172 LBS

## 2021-01-18 DIAGNOSIS — G47.33 OSA (OBSTRUCTIVE SLEEP APNEA): ICD-10-CM

## 2021-01-18 DIAGNOSIS — G25.81 RESTLESS LEG SYNDROME: ICD-10-CM

## 2021-01-18 DIAGNOSIS — I10 ESSENTIAL HYPERTENSION: ICD-10-CM

## 2021-01-18 DIAGNOSIS — Z78.9 NONSMOKER: ICD-10-CM

## 2021-01-18 PROCEDURE — 99214 OFFICE O/P EST MOD 30 MIN: CPT | Performed by: NURSE PRACTITIONER

## 2021-01-18 ASSESSMENT — FIBROSIS 4 INDEX: FIB4 SCORE: 1.63

## 2021-01-18 NOTE — PROGRESS NOTES
Chief Complaint   Patient presents with   • Apnea     Last Seen 11/12/2020        HPI:  Karsten Jaime is a 65 y.o. year old male here today for follow-up on SURINDER; first compliance check.  He is accompanied by his wife.  Last OV 11/12/20    PSG indicated a sleep efficiency of 54.45% and did not achieve split night due to late onset sleep, moderate SURINDER with AHI of 19.1, RDI 21.1/h and mild nocturnal desaturation with O2 tosha of 82%.  Patient spent 2.2% of recording time with a 90% ox saturation.  PLMS index 87.5.    He is using CPAP 4 to 10 cm.  Compliance report 12/18/2021 1621 indicates 100% compliance, average nightly use 5 hours 23 minutes, minimal to moderate mask leak with reduced AHI 1.3/h.  Mean pressure 7.3 cm.  I reviewed with patient. He tolerates nasal mask and pressure ok. He does wake with dry mouth and wife believes he has mouth open at times. He denies AM headaches/fogginess. He feels more rested and consistent energy during the day. No snoring. He denies any changes in health since last OV; no cardiac or respiratory symptoms. He does have intermittent sinus congestion and uses flonase prior to bedtime.    He does have a history of restless legs and already on ropinirole but notes only taking as needed with minimal benefit.  He feels RLS is much better since starting therapy.    He notes GI issues to have improved as well but pending f/u with GI for further evaulation.    ROS: As per HPI and otherwise negative if not stated.    Past Medical History:   Diagnosis Date   • GERD (gastroesophageal reflux disease)    • History of hiatal hernia 11/25/2019    S/p hiatal hernia repaired by Dr. Gu on July 12, 2019.   • Hyperlipidemia    • Hypertension    • IFG (impaired fasting glucose) 5/10/2016       Past Surgical History:   Procedure Laterality Date   • CATARACT PHACO WITH IOL Left 8/14/2017    Procedure: CATARACT PHACO WITH IOL;  Surgeon: Dwayne Ching M.D.;  Location: SURGERY SAME DAY [facility]  ORS;  Service:    • CATARACT PHACO WITH IOL Right 7/24/2017    Procedure: CATARACT PHACO WITH IOL KPE WITH PLANO SUPERIOR;  Surgeon: Dwayne Ching M.D.;  Location: SURGERY SAME DAY [facility] ORS;  Service:    • GASTROSCOPY WITH BIOPSY  1/29/2014    Performed by Nick Salas M.D. at SURGERY [facility]   • EGD W/ENDOSCOPIC ULTRASOUND  2/21/2013    Performed by Nick Salas M.D. at SURGERY [facility]   • GASTROSCOPY WITH BIOPSY  2/21/2013    Performed by Nick Salas M.D. at [facility]   • [place] UPPER GI ENDOSCOPY,EXAM         Family History   Problem Relation Age of Onset   • Genetic Disorder Mother 75        GERD   • Diabetes Father    • Hypertension Brother    • Diabetes Brother        Social History     Socioeconomic History   • Marital status:      Spouse name: Not on file   • Number of children: Not on file   • Years of education: Not on file   • Highest education level: Not on file   Occupational History   • Not on file   Social Needs   • Financial resource strain: Not on file   • Food insecurity     Worry: Not on file     Inability: Not on file   • Transportation needs     Medical: Not on file     Non-medical: Not on file   Tobacco Use   • Smoking status: Never Smoker   • Smokeless tobacco: Never Used   Substance and Sexual Activity   • Alcohol use: Yes     Frequency: 2-3 times a week     Drinks per session: 3 or 4     Binge frequency: Monthly     Comment: Occasionally   • Drug use: No   • Sexual activity: Yes     Partners: Female   Lifestyle   • Physical activity     Days per week: Not on file     Minutes per session: Not on file   • Stress: Not on file   Relationships   • Social connections     Talks on phone: Not on file     Gets together: Not on file     Attends Taoist service: Not on file     Active member of club or organization: Not on file     Attends meetings of clubs or organizations: Not on file     Relationship status: Not on file   • Intimate partner  "violence     Fear of current or ex partner: Not on file     Emotionally abused: Not on file     Physically abused: Not on file     Forced sexual activity: Not on file   Other Topics Concern   • Not on file   Social History Narrative   • Not on file       Allergies as of 01/18/2021 - Reviewed 01/18/2021   Allergen Reaction Noted   • Padmini-seltzer [aspirin effervescent]  11/30/2010   • Aspirin  11/30/2010   • Padmini-seltzer plus cold-cough  10/26/2020   • Pollen extract  03/31/2020        Vitals:  /64 (BP Location: Left arm, Patient Position: Sitting, BP Cuff Size: Adult)   Pulse 71   Resp 16   Ht 1.702 m (5' 7\")   Wt 78 kg (172 lb)   SpO2 98%     Current medications as of today   Current Outpatient Medications   Medication Sig Dispense Refill   • tamsulosin (FLOMAX) 0.4 MG capsule TAKE 1 CAPSULE BY MOUTH EVERYDAY AT BEDTIME     • fluticasone (FLONASE) 50 MCG/ACT nasal spray SPRAY 2 SPRAYS IN NOSE EVERY DAY. 48 mL 1   • simvastatin (ZOCOR) 20 MG Tab Take 20 mg by mouth every evening.     • triamterene/hctz (MAXZIDE-25/DYAZIDE) 37.5-25 MG Cap Take 1 Cap by mouth every morning.     • losartan (COZAAR) 50 MG Tab Take 1 Tab by mouth every day. 90 Tab 3   • Omega-3 Fatty Acids (OMEGA-3 FISH OIL PO) Take 1 Cap by mouth every day.     • influenza vaccine quad 0.5 ML Suspension Prefilled Syringe injection Fluzone Quad 5828-4120 (PF) 60 mcg (15 mcg x 4)/0.5 mL IM syringe       No current facility-administered medications for this visit.          Physical Exam:   Gen:           Alert and oriented, No apparent distress. Mood and affect appropriate, normal interaction with examiner.  Eyes:          PERRL, EOM intact, sclere white, conjunctive moist.  Ears:          Not examined.   Hearing:     Grossly intact.  Nose:          Normal, no lesions or deformities.  Dentition:    mask  Oropharynx:   mask  Mallampati Classification: mask  Neck:        Supple, trachea midline, no masses.  Respiratory Effort: No intercostal " retractions or use of accessory muscles.   Lung Auscultation:      Clear to auscultation bilaterally; no rales, rhonchi or wheezing.  CV:            Regular rate and rhythm. No murmurs, rubs or gallops.  Abd:           Not examined.   Lymphadenopathy: Not examined.  Gait and Station: Normal.  Digits and Nails: No clubbing, cyanosis, petechiae, or nodes.   Cranial Nerves: II-XII grossly intact.  Skin:        No rashes, lesions or ulcers noted.               Ext:           No cyanosis or edema.      Assessment:  1. SURINDER (obstructive sleep apnea)     2. Restless leg syndrome     3. Essential hypertension     4. BMI 26.0-26.9,adult     5. Nonsmoker         Immunizations:    Flu:9/2020  Pneumovax 23:not due  Prevnar 13:not due    Plan:  1. Patient is responding well to therapy. Continue APAP nightly.  CNOX on pap now; may call results.  Use cpap tape to mouth at night; if ineffective to prevent dry mouth, call DME for FFM fit  2. Discussed sleep hygiene and handout provided.  3. Encouraged f/u with GI for stomach issues and PCP for other health concerns  4. RLS has improved and minimal; continue to monitor  5. F/u in 4 mos with second compliance check/CNOX results, sooner if needed.    Please note that this dictation was created using voice recognition software. I have made every reasonable attempt to correct obvious errors, but it is possible there are errors of grammar and possibly content that I did not discover before finalizing the note.

## 2021-01-21 ENCOUNTER — TELEPHONE (OUTPATIENT)
Dept: SLEEP MEDICINE | Facility: MEDICAL CENTER | Age: 66
End: 2021-01-21

## 2021-01-22 NOTE — TELEPHONE ENCOUNTER
Returned patient wife's call.     They were confussed as to what type of sleep testing we wanted.  This has now been sorted out. Patient is scheduled for an overnight oximetry on 2/1/21.

## 2021-01-28 ENCOUNTER — HOSPITAL ENCOUNTER (OUTPATIENT)
Dept: LAB | Facility: MEDICAL CENTER | Age: 66
End: 2021-01-28
Attending: UROLOGY
Payer: COMMERCIAL

## 2021-01-28 LAB — PSA SERPL-MCNC: 5.01 NG/ML (ref 0–4)

## 2021-01-28 PROCEDURE — 84153 ASSAY OF PSA TOTAL: CPT

## 2021-01-28 PROCEDURE — 36415 COLL VENOUS BLD VENIPUNCTURE: CPT

## 2021-02-01 ENCOUNTER — HOME STUDY (OUTPATIENT)
Dept: SLEEP MEDICINE | Facility: MEDICAL CENTER | Age: 66
End: 2021-02-01
Attending: NURSE PRACTITIONER
Payer: COMMERCIAL

## 2021-02-01 DIAGNOSIS — G47.33 OSA (OBSTRUCTIVE SLEEP APNEA): ICD-10-CM

## 2021-02-01 PROCEDURE — 94762 N-INVAS EAR/PLS OXIMTRY CONT: CPT | Performed by: INTERNAL MEDICINE

## 2021-02-02 NOTE — PROCEDURES
Interpretation:    This overnight oximetry was recorded on February 2, 2021 with the patient on APAP 4-10 cm water.  The analysis duration was 6 hours 37 minutes.  17 total oximetric events occurred encompassing 14.6 minutes .  The average event duration was 51.5 seconds .  The saturations were less than 90% for 5.5% of the recording.  The tosha saturation was 87% .  The patient spent 0.5 minutes with saturations < 88%.  Overall, this continuous nocturnal oximetry demonstrates no significant O2 desaturation while on positive airway pressure therapy.      Recommendation:    Clinical correlation is needed.  Recommend continuing APAP 4-10 cm water

## 2021-03-15 DIAGNOSIS — Z23 NEED FOR VACCINATION: ICD-10-CM

## 2021-04-09 DIAGNOSIS — I10 ESSENTIAL HYPERTENSION: ICD-10-CM

## 2021-04-09 RX ORDER — LOSARTAN POTASSIUM 50 MG/1
TABLET ORAL
Qty: 90 TABLET | Refills: 3 | Status: SHIPPED | OUTPATIENT
Start: 2021-04-09 | End: 2022-03-24

## 2021-04-13 DIAGNOSIS — G25.81 RESTLESS LEG SYNDROME: ICD-10-CM

## 2021-04-13 RX ORDER — ROPINIROLE 0.25 MG/1
TABLET, FILM COATED ORAL
Qty: 30 TABLET | Refills: 2 | Status: SHIPPED | OUTPATIENT
Start: 2021-04-13 | End: 2021-05-13

## 2021-04-30 RX ORDER — TRIAMTERENE AND HYDROCHLOROTHIAZIDE 37.5; 25 MG/1; MG/1
CAPSULE ORAL
Qty: 90 CAPSULE | Refills: 3 | Status: SHIPPED | OUTPATIENT
Start: 2021-04-30 | End: 2022-03-24

## 2021-04-30 RX ORDER — SIMVASTATIN 20 MG
TABLET ORAL
Qty: 90 TABLET | Refills: 3 | Status: SHIPPED | OUTPATIENT
Start: 2021-04-30 | End: 2022-03-24

## 2021-05-03 ENCOUNTER — HOSPITAL ENCOUNTER (OUTPATIENT)
Dept: LAB | Facility: MEDICAL CENTER | Age: 66
End: 2021-05-03
Attending: UROLOGY
Payer: COMMERCIAL

## 2021-05-03 PROCEDURE — 84153 ASSAY OF PSA TOTAL: CPT

## 2021-05-03 PROCEDURE — 36415 COLL VENOUS BLD VENIPUNCTURE: CPT

## 2021-05-03 PROCEDURE — 84154 ASSAY OF PSA FREE: CPT

## 2021-05-05 LAB
PSA FREE MFR SERPL: 21 %
PSA FREE SERPL-MCNC: 1.2 NG/ML
PSA SERPL-MCNC: 5.6 NG/ML (ref 0–4)

## 2021-05-18 ENCOUNTER — OFFICE VISIT (OUTPATIENT)
Dept: SLEEP MEDICINE | Facility: MEDICAL CENTER | Age: 66
End: 2021-05-18
Payer: COMMERCIAL

## 2021-05-18 VITALS
RESPIRATION RATE: 16 BRPM | BODY MASS INDEX: 28.09 KG/M2 | HEART RATE: 86 BPM | SYSTOLIC BLOOD PRESSURE: 140 MMHG | WEIGHT: 179 LBS | OXYGEN SATURATION: 95 % | DIASTOLIC BLOOD PRESSURE: 68 MMHG | HEIGHT: 67 IN

## 2021-05-18 DIAGNOSIS — Z78.9 NONSMOKER: ICD-10-CM

## 2021-05-18 DIAGNOSIS — I10 ESSENTIAL HYPERTENSION: ICD-10-CM

## 2021-05-18 DIAGNOSIS — G47.33 OSA (OBSTRUCTIVE SLEEP APNEA): ICD-10-CM

## 2021-05-18 DIAGNOSIS — G25.81 RESTLESS LEG SYNDROME: ICD-10-CM

## 2021-05-18 PROCEDURE — 99214 OFFICE O/P EST MOD 30 MIN: CPT | Performed by: NURSE PRACTITIONER

## 2021-05-18 ASSESSMENT — FIBROSIS 4 INDEX: FIB4 SCORE: 1.66

## 2021-05-18 NOTE — PROGRESS NOTES
Chief Complaint   Patient presents with   • Apnea     Last Seen 1/18/2021        HPI:  Karsten Jaime is a 66 y.o. year old male here today for follow-up on SURINDER; second compliance check and CNOX results.  He is accompanied by his wife.  Last OV 1/18/21    CNOX ON PAP 2/1/2021 noted basal SPO2 of 91.3% less than 88% for 0.5 minutes of the night.  Overall adequate oxygenation.  Reviewed with patient.  Compliance report 4/18/2021 through 5/17/2021 notes 100% compliance, average nightly use 5 hours 1 minute, mean pressure 7.3 cm, minimal to moderate mask leak with reduced AHI 3.0/h.  Reviewed with patient. He feels he sleeps well on therapy now. Energy levels are improved; less fatigue and no napping. He denies AM headaches. He tolerates mask and pressure.  No cardiac or respiratory symptoms.    SLEEP HX:  PSG indicated a sleep efficiency of 54.45% and did not achieve split night due to late onset sleep, moderate SURINDER with AHI of 19.1, RDI 21.1/h and mild nocturnal desaturation with O2 tosha of 82%.  Patient spent 2.2% of recording time with a 90% ox saturation.  PLMS index 87.5.    He is using CPAP 4 to 10 cm.     He does have a history of restless legs and already on ropinirole but notes only taking as needed with minimal benefit.  He feels RLS is much better since starting therapy.    ROS: As per HPI and otherwise negative if not stated.    Past Medical History:   Diagnosis Date   • GERD (gastroesophageal reflux disease)    • History of hiatal hernia 11/25/2019    S/p hiatal hernia repaired by Dr. Gu on July 12, 2019.   • Hyperlipidemia    • Hypertension    • IFG (impaired fasting glucose) 5/10/2016       Past Surgical History:   Procedure Laterality Date   • CATARACT PHACO WITH IOL Left 8/14/2017    Procedure: CATARACT PHACO WITH IOL;  Surgeon: Dwayne Ching M.D.;  Location: SURGERY SAME DAY [facility];  Service:    • CATARACT PHACO WITH IOL Right 7/24/2017    Procedure: CATARACT PHACO WITH IOL KPE WITH  [facility];  Surgeon: Dwayne Ching M.D.;  Location: SURGERY SAME DAY [facility];  Service:    • GASTROSCOPY WITH BIOPSY  1/29/2014    Performed by Nick Salas M.D. at [facility]   • EGD W/ENDOSCOPIC ULTRASOUND  2/21/2013    Performed by Nick Salas M.D. at [facility]   • GASTROSCOPY WITH BIOPSY  2/21/2013    Performed by Nick Salas M.D. at [facility]   • [place] UPPER GI ENDOSCOPY,EXAM         Family History   Problem Relation Age of Onset   • Genetic Disorder Mother 75        GERD   • Diabetes Father    • Hypertension Brother    • Diabetes Brother        Social History     Socioeconomic History   • Marital status:      Spouse name: Not on file   • Number of children: Not on file   • Years of education: Not on file   • Highest education level: Not on file   Occupational History   • Not on file   Tobacco Use   • Smoking status: Never Smoker   • Smokeless tobacco: Never Used   Vaping Use   • Vaping Use: Never used   Substance and Sexual Activity   • Alcohol use: Yes     Comment: Occasionally   • Drug use: No   • Sexual activity: Yes     Partners: Female   Other Topics Concern   • Not on file   Social History Narrative   • Not on file     Social Determinants of Health     Financial Resource Strain:    • Difficulty of Paying Living Expenses:    Food Insecurity:    • Worried About Running Out of Food in the Last Year:    • Ran Out of Food in the Last Year:    Transportation Needs:    • Lack of Transportation (Medical):    • Lack of Transportation (Non-Medical):    Physical Activity:    • Days of Exercise per Week:    • Minutes of Exercise per Session:    Stress:    • Feeling of Stress :    Social Connections:    • Frequency of Communication with Friends and Family:    • Frequency of Social Gatherings with Friends and Family:    • Attends Temple Services:    • Active Member of Clubs or Organizations:    • Attends Club or Organization Meetings:    •  "Marital Status:    Intimate Partner Violence:    • Fear of Current or Ex-Partner:    • Emotionally Abused:    • Physically Abused:    • Sexually Abused:        Allergies as of 05/18/2021 - Reviewed 05/18/2021   Allergen Reaction Noted   • Padmini-seltzer [aspirin effervescent]  11/30/2010   • Aspirin  11/30/2010   • Padmini-seltzer plus cold-cough  10/26/2020   • Pollen extract  03/31/2020        Vitals:  /68 (BP Location: Left arm, Patient Position: Sitting, BP Cuff Size: Adult)   Pulse 86   Resp 16   Ht 1.702 m (5' 7\")   Wt 81.2 kg (179 lb)   SpO2 95%     Current medications as of today   Current Outpatient Medications   Medication Sig Dispense Refill   • simvastatin (ZOCOR) 20 MG Tab TAKE 1 TABLET BY MOUTH DAILY IN THE EVENING FOR 90 DAYS 90 tablet 3   • triamterene/hctz (MAXZIDE-25/DYAZIDE) 37.5-25 MG Cap TAKE 1 CAPSULE BY MOUTH DAILY FOR 90 DAYS 90 capsule 3   • losartan (COZAAR) 50 MG Tab TAKE 1 TABLET BY MOUTH EVERY DAY 90 tablet 3   • influenza vaccine quad 0.5 ML Suspension Prefilled Syringe injection Fluzone Quad 1031-5312 (PF) 60 mcg (15 mcg x 4)/0.5 mL IM syringe     • tamsulosin (FLOMAX) 0.4 MG capsule TAKE 1 CAPSULE BY MOUTH EVERYDAY AT BEDTIME     • fluticasone (FLONASE) 50 MCG/ACT nasal spray SPRAY 2 SPRAYS IN NOSE EVERY DAY. 48 mL 1   • Omega-3 Fatty Acids (OMEGA-3 FISH OIL PO) Take 1 Cap by mouth every day.       No current facility-administered medications for this visit.         Physical Exam:   Gen:           Alert and oriented, No apparent distress. Mood and affect appropriate, normal interaction with examiner.  Eyes:          PERRL, EOM intact, sclere white, conjunctive moist.  Ears:          Not examined.   Hearing:     Grossly intact.  Nose:          Normal, no lesions or deformities.  Dentition:    mask  Oropharynx:   mask  Mallampati Classification: mask  Neck:        Supple, trachea midline, no masses.  Respiratory Effort: No intercostal retractions or use of accessory muscles. "   Lung Auscultation:      Clear to auscultation bilaterally; no rales, rhonchi or wheezing.  CV:            Regular rate and rhythm. No murmurs, rubs or gallops.  Abd:           Not examined.   Lymphadenopathy: Not examined.  Gait and Station: Normal.  Digits and Nails: No clubbing, cyanosis, petechiae, or nodes.   Cranial Nerves: II-XII grossly intact.  Skin:        No rashes, lesions or ulcers noted.               Ext:           No cyanosis or edema.      Assessment:  1. SURINDER (obstructive sleep apnea)     2. Restless leg syndrome     3. Essential hypertension     4. BMI 28.0-28.9,adult     5. Nonsmoker         Immunizations:    Flu:9/2020  Pneumovax 23:due age 67  Prevnar 13:due age 67  COVID-19: 2/27/21, 1/30/21    Plan:  1. SURINDER is well treated. Continue CPAP nightly.  DME mask/supplies.  2. Discussed sleep hygiene  3. No complaints of RLS now  4. F/u with PCP for ongoing management of BP; slightly elevated on exam today; previous normal readings in office  5. Follow up in 1 year with compliance report, sooner if needed.    Please note that this dictation was created using voice recognition software. I have made every reasonable attempt to correct obvious errors, but it is possible there are errors of grammar and possibly content that I did not discover before finalizing the note.

## 2021-05-24 RX ORDER — OMEPRAZOLE 20 MG/1
CAPSULE, DELAYED RELEASE ORAL
COMMUNITY
End: 2021-06-14

## 2021-05-24 RX ORDER — OMEPRAZOLE 20 MG/1
CAPSULE, DELAYED RELEASE ORAL
COMMUNITY
Start: 2021-05-07 | End: 2024-01-22 | Stop reason: SDUPTHER

## 2021-05-24 RX ORDER — OSELTAMIVIR PHOSPHATE 75 MG/1
CAPSULE ORAL
COMMUNITY
End: 2021-06-14

## 2021-05-24 RX ORDER — ZOLPIDEM TARTRATE 5 MG/1
TABLET ORAL
COMMUNITY
End: 2022-06-16

## 2021-06-14 ENCOUNTER — OFFICE VISIT (OUTPATIENT)
Dept: MEDICAL GROUP | Age: 66
End: 2021-06-14
Payer: COMMERCIAL

## 2021-06-14 VITALS
BODY MASS INDEX: 28.72 KG/M2 | SYSTOLIC BLOOD PRESSURE: 112 MMHG | HEART RATE: 63 BPM | TEMPERATURE: 97.1 F | WEIGHT: 183 LBS | DIASTOLIC BLOOD PRESSURE: 60 MMHG | OXYGEN SATURATION: 95 % | HEIGHT: 67 IN

## 2021-06-14 DIAGNOSIS — R97.20 RAISED PROSTATE SPECIFIC ANTIGEN: ICD-10-CM

## 2021-06-14 DIAGNOSIS — G47.33 OSA (OBSTRUCTIVE SLEEP APNEA): Chronic | ICD-10-CM

## 2021-06-14 DIAGNOSIS — Z00.00 WELL ADULT EXAM: ICD-10-CM

## 2021-06-14 DIAGNOSIS — E78.00 PURE HYPERCHOLESTEROLEMIA: ICD-10-CM

## 2021-06-14 DIAGNOSIS — I10 ESSENTIAL HYPERTENSION: ICD-10-CM

## 2021-06-14 DIAGNOSIS — G25.81 RESTLESS LEG SYNDROME: ICD-10-CM

## 2021-06-14 DIAGNOSIS — Z23 NEED FOR VACCINATION: ICD-10-CM

## 2021-06-14 PROCEDURE — 90471 IMMUNIZATION ADMIN: CPT | Performed by: PHYSICIAN ASSISTANT

## 2021-06-14 PROCEDURE — 99396 PREV VISIT EST AGE 40-64: CPT | Mod: 25 | Performed by: PHYSICIAN ASSISTANT

## 2021-06-14 PROCEDURE — 90750 HZV VACC RECOMBINANT IM: CPT | Performed by: PHYSICIAN ASSISTANT

## 2021-06-14 RX ORDER — ROPINIROLE 0.25 MG/1
TABLET, FILM COATED ORAL
COMMUNITY
End: 2021-07-21

## 2021-06-14 ASSESSMENT — FIBROSIS 4 INDEX: FIB4 SCORE: 1.66

## 2021-06-14 ASSESSMENT — PATIENT HEALTH QUESTIONNAIRE - PHQ9: CLINICAL INTERPRETATION OF PHQ2 SCORE: 0

## 2021-06-14 NOTE — PROGRESS NOTES
Subjective:     CC:   Chief Complaint   Patient presents with   • Annual Exam       HPI:   Karsten Jaime is a 66 y.o. male who presents for annual exam    Last Colorectal Cancer Screening: UTD  Last Tdap: UTD  Received HPV series: Aged out  Hx STDs: No    Exercise: Runs for about 20 minutes 3 times a week  Diet: good      He  has a past medical history of GERD (gastroesophageal reflux disease), History of hiatal hernia (11/25/2019), Hyperlipidemia, Hypertension, and IFG (impaired fasting glucose) (5/10/2016).  He  has a past surgical history that includes pr upper gi endoscopy,exam; egd w/endoscopic ultrasound (2/21/2013); gastroscopy with biopsy (2/21/2013); gastroscopy with biopsy (1/29/2014); cataract phaco with iol (Right, 7/24/2017); and cataract phaco with iol (Left, 8/14/2017).    Family History   Problem Relation Age of Onset   • Genetic Disorder Mother 75        GERD   • Diabetes Father    • Hypertension Brother    • Diabetes Brother      Social History     Tobacco Use   • Smoking status: Never Smoker   • Smokeless tobacco: Never Used   Vaping Use   • Vaping Use: Never used   Substance Use Topics   • Alcohol use: Yes     Comment: Occasionally   • Drug use: No     He  reports being sexually active and has had partner(s) who are Female.    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  "G47.33 **BRING TO SLEEP STUDY**     • omeprazole (PRILOSEC) 20 MG delayed-release capsule      • simvastatin (ZOCOR) 20 MG Tab TAKE 1 TABLET BY MOUTH DAILY IN THE EVENING FOR 90 DAYS 90 tablet 3   • triamterene/hctz (MAXZIDE-25/DYAZIDE) 37.5-25 MG Cap TAKE 1 CAPSULE BY MOUTH DAILY FOR 90 DAYS 90 capsule 3   • losartan (COZAAR) 50 MG Tab TAKE 1 TABLET BY MOUTH EVERY DAY 90 tablet 3   • influenza vaccine quad 0.5 ML Suspension Prefilled Syringe injection Fluzone Quad 5914-1316 (PF) 60 mcg (15 mcg x 4)/0.5 mL IM syringe     • tamsulosin (FLOMAX) 0.4 MG capsule TAKE 1 CAPSULE BY MOUTH EVERYDAY AT BEDTIME     • fluticasone (FLONASE) 50 MCG/ACT nasal spray SPRAY 2 SPRAYS IN NOSE EVERY DAY. 48 mL 1   • Omega-3 Fatty Acids (OMEGA-3 FISH OIL PO) Take 1 Cap by mouth every day.       No current facility-administered medications for this visit.     Allergies   Allergen Reactions   • Padmini-[place] [Aspirin Effervescent]      'can not breath to good and sneezing'   • Aspirin      'can not breathe to good and sneezing'   • Padmini-[place] Plus Cold-Cough    • Pollen Extract        Review of Systems   Constitutional: Negative for fever, chills and malaise/fatigue.   HENT: Negative for congestion.    Eyes: Negative for pain.   Respiratory: Negative for cough and shortness of breath.    Cardiovascular: Negative for chest pain and leg swelling.   Gastrointestinal: Negative for nausea, vomiting, abdominal pain and diarrhea.   Genitourinary: Negative for dysuria and hematuria.   Skin: Negative for rash.   Neurological: Negative for dizziness, focal weakness and headaches.   Endo/Heme/Allergies: Does not bruise/bleed easily.   Psychiatric/Behavioral: Negative for depression.  The patient is not nervous/anxious.      Objective:   /60 (BP Location: Left arm, Patient Position: Sitting, BP Cuff Size: Adult)   Pulse 63   Temp 36.2 °C (97.1 °F) (Temporal)   Ht 1.702 m (5' 7\")   Wt 83 kg (183 lb)   SpO2 95%   BMI 28.66 kg/m²      Wt " Readings from Last 4 Encounters:   06/14/21 83 kg (183 lb)   05/18/21 81.2 kg (179 lb)   01/18/21 78 kg (172 lb)   11/23/20 79 kg (174 lb 3.2 oz)         Physical Exam:  Constitutional: Well-developed and well-nourished. Not diaphoretic. No distress.   Skin: Skin is warm and dry. No rash noted.  Head: Atraumatic without lesions.  Eyes: Conjunctivae and extraocular motions are normal. Pupils are equal, round, and reactive to light. No scleral icterus.   Ears:  External ears unremarkable. Tympanic membranes clear and intact.  Nose: Nares patent. Septum midline. Turbinates without erythema nor edema. No discharge.   Mouth/Throat: Tongue normal. Oropharynx is clear and moist. Posterior pharynx without erythema or exudates.  Neck: Supple, trachea midline. Normal range of motion. No thyromegaly present. No lymphadenopathy--cervical or supraclavicular.  Cardiovascular: Regular rate and rhythm, S1 and S2 without murmur, rubs, or gallops.    Respiratory: Effort normal. Clear to auscultation throughout. No adventitious sounds.   Abdomen: Soft, non tender, and without distention. Active bowel sounds in all four quadrants. No rebound, guarding, masses or HSM.  Extremities: No cyanosis, clubbing, erythema, nor edema. Distal pulses intact and symmetric.   Musculoskeletal: All major joints AROM full in all directions without pain.  Neurological: Alert and oriented x 3. Grossly non-focal. Strength and sensation grossly intact. DTRs 2+/3 and symmetric.   Psychiatric:  Behavior, mood, and affect are appropriate.      Assessment and Plan:     1. Well adult exam  -Advised to increase physical activity as tolerated and reviewed diet control.  Will check below labs.  Further treatment if needed pending results  - Comp Metabolic Panel; Future  - Lipid Profile; Future  - TSH WITH REFLEX TO FT4; Future  - CBC WITH DIFFERENTIAL; Future    2. Pure hypercholesterolemia  -Has been well controlled on 20 mg of simvastatin.  He is due for labs.   Will adjust medication if needed pending results  - Comp Metabolic Panel; Future  - Lipid Profile; Future    3. Raised prostate specific antigen  -Currently followed by urology.  Has an appointment in 4 weeks.  Follow-up as recommended    4. Essential hypertension  -Controlled.  Continue current medication.    5. Restless leg syndrome  -Stable.  Intermittently uses Requip.  Continue as needed    6. SURINDER (obstructive sleep apnea)  -On CPAP.  Followed by sleep medicine.    7. Need for vaccination  -Immunization given in clinic today  - Shingrix Vaccine        Health maintenance:     Labs per orders  Immunizations per orders  Patient counseled about skin care, diet, supplements, and exercise.  Discussed diet and exercise, colorectal cancer screening and prostate cancer screening     Follow-up: Return in about 1 year (around 6/14/2022) for AWV.

## 2021-06-18 ENCOUNTER — HOSPITAL ENCOUNTER (OUTPATIENT)
Dept: LAB | Facility: MEDICAL CENTER | Age: 66
End: 2021-06-18
Attending: PHYSICIAN ASSISTANT
Payer: COMMERCIAL

## 2021-06-18 DIAGNOSIS — E78.00 PURE HYPERCHOLESTEROLEMIA: ICD-10-CM

## 2021-06-18 DIAGNOSIS — Z00.00 WELL ADULT EXAM: ICD-10-CM

## 2021-06-18 LAB
ALBUMIN SERPL BCP-MCNC: 3.7 G/DL (ref 3.2–4.9)
ALBUMIN/GLOB SERPL: 1.1 G/DL
ALP SERPL-CCNC: 93 U/L (ref 30–99)
ALT SERPL-CCNC: 20 U/L (ref 2–50)
ANION GAP SERPL CALC-SCNC: 7 MMOL/L (ref 7–16)
AST SERPL-CCNC: 18 U/L (ref 12–45)
BASOPHILS # BLD AUTO: 0.5 % (ref 0–1.8)
BASOPHILS # BLD: 0.02 K/UL (ref 0–0.12)
BILIRUB SERPL-MCNC: 1 MG/DL (ref 0.1–1.5)
BUN SERPL-MCNC: 16 MG/DL (ref 8–22)
CALCIUM SERPL-MCNC: 8.8 MG/DL (ref 8.5–10.5)
CHLORIDE SERPL-SCNC: 105 MMOL/L (ref 96–112)
CHOLEST SERPL-MCNC: 122 MG/DL (ref 100–199)
CO2 SERPL-SCNC: 24 MMOL/L (ref 20–33)
CREAT SERPL-MCNC: 1.07 MG/DL (ref 0.5–1.4)
EOSINOPHIL # BLD AUTO: 0.17 K/UL (ref 0–0.51)
EOSINOPHIL NFR BLD: 3.9 % (ref 0–6.9)
ERYTHROCYTE [DISTWIDTH] IN BLOOD BY AUTOMATED COUNT: 43.9 FL (ref 35.9–50)
FASTING STATUS PATIENT QL REPORTED: NORMAL
GLOBULIN SER CALC-MCNC: 3.3 G/DL (ref 1.9–3.5)
GLUCOSE SERPL-MCNC: 112 MG/DL (ref 65–99)
HCT VFR BLD AUTO: 46.8 % (ref 42–52)
HDLC SERPL-MCNC: 42 MG/DL
HGB BLD-MCNC: 15.9 G/DL (ref 14–18)
IMM GRANULOCYTES # BLD AUTO: 0.01 K/UL (ref 0–0.11)
IMM GRANULOCYTES NFR BLD AUTO: 0.2 % (ref 0–0.9)
LDLC SERPL CALC-MCNC: 56 MG/DL
LYMPHOCYTES # BLD AUTO: 1.44 K/UL (ref 1–4.8)
LYMPHOCYTES NFR BLD: 33.1 % (ref 22–41)
MCH RBC QN AUTO: 30.3 PG (ref 27–33)
MCHC RBC AUTO-ENTMCNC: 34 G/DL (ref 33.7–35.3)
MCV RBC AUTO: 89.1 FL (ref 81.4–97.8)
MONOCYTES # BLD AUTO: 0.44 K/UL (ref 0–0.85)
MONOCYTES NFR BLD AUTO: 10.1 % (ref 0–13.4)
NEUTROPHILS # BLD AUTO: 2.27 K/UL (ref 1.82–7.42)
NEUTROPHILS NFR BLD: 52.2 % (ref 44–72)
NRBC # BLD AUTO: 0 K/UL
NRBC BLD-RTO: 0 /100 WBC
PLATELET # BLD AUTO: 172 K/UL (ref 164–446)
PMV BLD AUTO: 10.8 FL (ref 9–12.9)
POTASSIUM SERPL-SCNC: 3.6 MMOL/L (ref 3.6–5.5)
PROT SERPL-MCNC: 7 G/DL (ref 6–8.2)
RBC # BLD AUTO: 5.25 M/UL (ref 4.7–6.1)
SODIUM SERPL-SCNC: 136 MMOL/L (ref 135–145)
TRIGL SERPL-MCNC: 120 MG/DL (ref 0–149)
TSH SERPL DL<=0.005 MIU/L-ACNC: 1.21 UIU/ML (ref 0.38–5.33)
WBC # BLD AUTO: 4.4 K/UL (ref 4.8–10.8)

## 2021-06-18 PROCEDURE — 84443 ASSAY THYROID STIM HORMONE: CPT

## 2021-06-18 PROCEDURE — 80061 LIPID PANEL: CPT

## 2021-06-18 PROCEDURE — 85025 COMPLETE CBC W/AUTO DIFF WBC: CPT

## 2021-06-18 PROCEDURE — 80053 COMPREHEN METABOLIC PANEL: CPT

## 2021-06-18 PROCEDURE — 36415 COLL VENOUS BLD VENIPUNCTURE: CPT

## 2021-08-02 PROBLEM — C61 PROSTATE CANCER (HCC): Status: ACTIVE | Noted: 2021-08-02

## 2022-01-05 ENCOUNTER — HOSPITAL ENCOUNTER (OUTPATIENT)
Facility: MEDICAL CENTER | Age: 67
End: 2022-01-05
Attending: UROLOGY
Payer: COMMERCIAL

## 2022-01-05 PROCEDURE — 84153 ASSAY OF PSA TOTAL: CPT

## 2022-01-06 LAB — PSA SERPL-MCNC: <0.02 NG/ML (ref 0–4)

## 2022-02-08 ENCOUNTER — HOSPITAL ENCOUNTER (OUTPATIENT)
Dept: LAB | Facility: MEDICAL CENTER | Age: 67
End: 2022-02-08
Attending: UROLOGY
Payer: COMMERCIAL

## 2022-02-08 LAB — PSA SERPL-MCNC: <0.02 NG/ML (ref 0–4)

## 2022-02-08 PROCEDURE — 36415 COLL VENOUS BLD VENIPUNCTURE: CPT

## 2022-02-08 PROCEDURE — 84153 ASSAY OF PSA TOTAL: CPT

## 2022-02-20 DIAGNOSIS — J30.1 SEASONAL ALLERGIC RHINITIS DUE TO POLLEN: ICD-10-CM

## 2022-02-22 RX ORDER — FLUTICASONE PROPIONATE 50 MCG
SPRAY, SUSPENSION (ML) NASAL
Qty: 48 ML | Refills: 1 | Status: SHIPPED | OUTPATIENT
Start: 2022-02-22

## 2022-03-24 DIAGNOSIS — I10 ESSENTIAL HYPERTENSION: ICD-10-CM

## 2022-03-24 RX ORDER — LOSARTAN POTASSIUM 50 MG/1
TABLET ORAL
Qty: 90 TABLET | Refills: 3 | Status: SHIPPED | OUTPATIENT
Start: 2022-03-24 | End: 2023-03-23

## 2022-03-24 RX ORDER — SIMVASTATIN 20 MG
TABLET ORAL
Qty: 90 TABLET | Refills: 3 | Status: SHIPPED | OUTPATIENT
Start: 2022-03-24 | End: 2023-03-23

## 2022-03-24 RX ORDER — TRIAMTERENE AND HYDROCHLOROTHIAZIDE 37.5; 25 MG/1; MG/1
CAPSULE ORAL
Qty: 90 CAPSULE | Refills: 3 | Status: SHIPPED | OUTPATIENT
Start: 2022-03-24 | End: 2023-03-23

## 2022-03-31 ENCOUNTER — OFFICE VISIT (OUTPATIENT)
Dept: URGENT CARE | Facility: CLINIC | Age: 67
End: 2022-03-31
Payer: COMMERCIAL

## 2022-03-31 VITALS
WEIGHT: 180 LBS | BODY MASS INDEX: 28.25 KG/M2 | RESPIRATION RATE: 20 BRPM | TEMPERATURE: 98.5 F | HEIGHT: 67 IN | DIASTOLIC BLOOD PRESSURE: 70 MMHG | OXYGEN SATURATION: 95 % | HEART RATE: 72 BPM | SYSTOLIC BLOOD PRESSURE: 118 MMHG

## 2022-03-31 DIAGNOSIS — R05.9 COUGH: ICD-10-CM

## 2022-03-31 DIAGNOSIS — J06.9 URI, ACUTE: ICD-10-CM

## 2022-03-31 DIAGNOSIS — H10.13 ALLERGIC CONJUNCTIVITIS OF BOTH EYES: ICD-10-CM

## 2022-03-31 LAB
EXTERNAL QUALITY CONTROL: NORMAL
INT CON NEG: NEGATIVE
INT CON POS: POSITIVE
S PYO AG THROAT QL: NEGATIVE
SARS-COV+SARS-COV-2 AG RESP QL IA.RAPID: NEGATIVE

## 2022-03-31 PROCEDURE — 87426 SARSCOV CORONAVIRUS AG IA: CPT | Performed by: NURSE PRACTITIONER

## 2022-03-31 PROCEDURE — 99213 OFFICE O/P EST LOW 20 MIN: CPT | Performed by: NURSE PRACTITIONER

## 2022-03-31 PROCEDURE — 87880 STREP A ASSAY W/OPTIC: CPT | Performed by: NURSE PRACTITIONER

## 2022-03-31 RX ORDER — OLOPATADINE HYDROCHLORIDE 1 MG/ML
1 SOLUTION/ DROPS OPHTHALMIC 2 TIMES DAILY
Qty: 5 ML | Refills: 0 | Status: SHIPPED | OUTPATIENT
Start: 2022-03-31 | End: 2022-06-16

## 2022-03-31 RX ORDER — BENZONATATE 200 MG/1
200 CAPSULE ORAL 3 TIMES DAILY PRN
Qty: 60 CAPSULE | Refills: 0 | Status: SHIPPED | OUTPATIENT
Start: 2022-03-31 | End: 2022-06-16

## 2022-03-31 ASSESSMENT — FIBROSIS 4 INDEX: FIB4 SCORE: 1.5

## 2022-03-31 ASSESSMENT — ENCOUNTER SYMPTOMS
SHORTNESS OF BREATH: 0
WHEEZING: 0
CHILLS: 0
FEVER: 0
COUGH: 1

## 2022-03-31 NOTE — PROGRESS NOTES
Subjective     Karsten Jaime is a 66 y.o. adult who presents with Cough (Sore throat, dry/phlegm, 1 week, post nasal drip)    Past Medical History:   Diagnosis Date   • GERD (gastroesophageal reflux disease)    • History of hiatal hernia 11/25/2019    S/p hiatal hernia repaired by Dr. Gu on July 12, 2019.   • Hyperlipidemia    • Hypertension    • IFG (impaired fasting glucose) 5/10/2016     Social History     Socioeconomic History   • Marital status: Other     Spouse name: Not on file   • Number of children: Not on file   • Years of education: Not on file   • Highest education level: Not on file   Occupational History   • Not on file   Tobacco Use   • Smoking status: Never Smoker   • Smokeless tobacco: Never Used   Vaping Use   • Vaping Use: Never used   Substance and Sexual Activity   • Alcohol use: Yes     Comment: Occasionally   • Drug use: No   • Sexual activity: Yes     Partners: Female   Other Topics Concern   • Not on file   Social History Narrative   • Not on file     Social Determinants of Health     Financial Resource Strain: Not on file   Food Insecurity: Not on file   Transportation Needs: Not on file   Physical Activity: Not on file   Stress: Not on file   Social Connections: Not on file   Intimate Partner Violence: Not on file   Housing Stability: Not on file     Family History   Problem Relation Age of Onset   • Genetic Disorder Mother 75        GERD   • Diabetes Father    • Hypertension Brother    • Diabetes Brother        Allergies: Aspirin, Aspirin effervescent, Padmini-seltzer plus cold-cough, and Pollen extract    Patient is a 66-year-old male who presents today with complaint of dry cough.  Onset of symptoms over the last week.  No fever, aches, or chills.  No shortness of breath or difficulty breathing. He also complains of redness to both eyes.  States he has mild crusting in the mornings otherwise has no drainage.            Cough  This is a new problem. The current episode started in  "the past 7 days. The problem has been unchanged. The problem occurs every few minutes. The cough is productive of sputum. Pertinent negatives include no chills, fever, shortness of breath or wheezing. Nothing aggravates the symptoms. She has tried nothing for the symptoms. The treatment provided no relief.       Review of Systems   Constitutional: Positive for malaise/fatigue. Negative for chills and fever.   HENT: Positive for congestion.    Respiratory: Positive for cough. Negative for shortness of breath and wheezing.    Skin: Negative.    All other systems reviewed and are negative.             Objective     /70 (BP Location: Left arm, Patient Position: Sitting, BP Cuff Size: Adult)   Pulse 72   Temp 36.9 °C (98.5 °F) (Temporal)   Resp 20   Ht 1.702 m (5' 7\")   Wt 81.6 kg (180 lb)   SpO2 95%   BMI 28.19 kg/m²      Physical Exam  Constitutional:       General: She is not in acute distress.     Appearance: She is well-developed.   HENT:      Head: Normocephalic and atraumatic.      Right Ear: External ear normal.      Left Ear: External ear normal.      Nose: Congestion present.      Mouth/Throat:      Mouth: Mucous membranes are moist.      Pharynx: No oropharyngeal exudate.   Eyes:      General: No scleral icterus.        Right eye: No discharge.         Left eye: No discharge.      Conjunctiva/sclera: Conjunctivae normal.      Pupils: Pupils are equal, round, and reactive to light.   Neck:      Thyroid: No thyroid mass or thyromegaly.      Vascular: No carotid bruit or JVD.   Cardiovascular:      Rate and Rhythm: Normal rate and regular rhythm.      Heart sounds: Normal heart sounds, S1 normal and S2 normal. No murmur heard.    No friction rub.   Pulmonary:      Effort: Pulmonary effort is normal. No respiratory distress.      Breath sounds: Normal breath sounds. No wheezing or rales.   Musculoskeletal:         General: Normal range of motion.      Cervical back: Normal range of motion and neck " supple.   Lymphadenopathy:      Head:      Right side of head: No submental, submandibular or occipital adenopathy.      Left side of head: No submental, submandibular or occipital adenopathy.      Cervical: No cervical adenopathy.   Skin:     General: Skin is warm and dry.      Findings: No erythema or rash.   Neurological:      Mental Status: She is alert and oriented to person, place, and time.      Cranial Nerves: No cranial nerve deficit.   Psychiatric:         Behavior: Behavior normal.         Thought Content: Thought content normal.       poct covid: negative                       Assessment & Plan   URI  Cough    Tessalon PRN cough  Patanol gtts daily as needed  OTC antihistamine of choice  Return to  for any further questions or concerns          There are no diagnoses linked to this encounter.

## 2022-06-16 ENCOUNTER — HOSPITAL ENCOUNTER (OUTPATIENT)
Dept: LAB | Facility: MEDICAL CENTER | Age: 67
End: 2022-06-16
Attending: PHYSICIAN ASSISTANT
Payer: COMMERCIAL

## 2022-06-16 ENCOUNTER — OFFICE VISIT (OUTPATIENT)
Dept: MEDICAL GROUP | Age: 67
End: 2022-06-16
Payer: COMMERCIAL

## 2022-06-16 VITALS
SYSTOLIC BLOOD PRESSURE: 108 MMHG | DIASTOLIC BLOOD PRESSURE: 68 MMHG | WEIGHT: 183.6 LBS | HEIGHT: 69 IN | TEMPERATURE: 98.4 F | BODY MASS INDEX: 27.19 KG/M2 | OXYGEN SATURATION: 95 % | HEART RATE: 61 BPM

## 2022-06-16 DIAGNOSIS — Z00.00 WELL ADULT EXAM: ICD-10-CM

## 2022-06-16 DIAGNOSIS — C61 PROSTATE CANCER (HCC): ICD-10-CM

## 2022-06-16 DIAGNOSIS — I10 ESSENTIAL HYPERTENSION: ICD-10-CM

## 2022-06-16 DIAGNOSIS — G25.81 RESTLESS LEG SYNDROME: ICD-10-CM

## 2022-06-16 DIAGNOSIS — G47.33 OSA (OBSTRUCTIVE SLEEP APNEA): Chronic | ICD-10-CM

## 2022-06-16 DIAGNOSIS — E78.00 PURE HYPERCHOLESTEROLEMIA: ICD-10-CM

## 2022-06-16 DIAGNOSIS — Z23 NEED FOR VACCINATION: ICD-10-CM

## 2022-06-16 PROBLEM — R97.20 RAISED PROSTATE SPECIFIC ANTIGEN: Status: RESOLVED | Noted: 2020-07-30 | Resolved: 2022-06-16

## 2022-06-16 LAB
ALBUMIN SERPL BCP-MCNC: 3.9 G/DL (ref 3.2–4.9)
ALBUMIN/GLOB SERPL: 1.3 G/DL
ALP SERPL-CCNC: 99 U/L (ref 30–99)
ALT SERPL-CCNC: 14 U/L (ref 2–50)
ANION GAP SERPL CALC-SCNC: 11 MMOL/L (ref 7–16)
AST SERPL-CCNC: 14 U/L (ref 12–45)
BASOPHILS # BLD AUTO: 0.6 % (ref 0–1.8)
BASOPHILS # BLD: 0.03 K/UL (ref 0–0.12)
BILIRUB SERPL-MCNC: 1 MG/DL (ref 0.1–1.5)
BUN SERPL-MCNC: 15 MG/DL (ref 8–22)
CALCIUM SERPL-MCNC: 9 MG/DL (ref 8.5–10.5)
CHLORIDE SERPL-SCNC: 106 MMOL/L (ref 96–112)
CHOLEST SERPL-MCNC: 137 MG/DL (ref 100–199)
CO2 SERPL-SCNC: 24 MMOL/L (ref 20–33)
CREAT SERPL-MCNC: 0.96 MG/DL (ref 0.5–1.4)
EOSINOPHIL # BLD AUTO: 0.19 K/UL (ref 0–0.51)
EOSINOPHIL NFR BLD: 4.1 % (ref 0–6.9)
ERYTHROCYTE [DISTWIDTH] IN BLOOD BY AUTOMATED COUNT: 44.6 FL (ref 35.9–50)
FASTING STATUS PATIENT QL REPORTED: NORMAL
GFR SERPLBLD CREATININE-BSD FMLA CKD-EPI: 88 ML/MIN/1.73 M 2
GLOBULIN SER CALC-MCNC: 3.1 G/DL (ref 1.9–3.5)
GLUCOSE SERPL-MCNC: 104 MG/DL (ref 65–99)
HCT VFR BLD AUTO: 51.3 % (ref 42–52)
HDLC SERPL-MCNC: 39 MG/DL
HGB BLD-MCNC: 17.7 G/DL (ref 14–18)
IMM GRANULOCYTES # BLD AUTO: 0.01 K/UL (ref 0–0.11)
IMM GRANULOCYTES NFR BLD AUTO: 0.2 % (ref 0–0.9)
LDLC SERPL CALC-MCNC: 72 MG/DL
LYMPHOCYTES # BLD AUTO: 1.87 K/UL (ref 1–4.8)
LYMPHOCYTES NFR BLD: 40.3 % (ref 22–41)
MCH RBC QN AUTO: 30.5 PG (ref 27–33)
MCHC RBC AUTO-ENTMCNC: 34.5 G/DL (ref 33.7–35.3)
MCV RBC AUTO: 88.4 FL (ref 81.4–97.8)
MONOCYTES # BLD AUTO: 0.34 K/UL (ref 0–0.85)
MONOCYTES NFR BLD AUTO: 7.3 % (ref 0–13.4)
NEUTROPHILS # BLD AUTO: 2.2 K/UL (ref 1.82–7.42)
NEUTROPHILS NFR BLD: 47.5 % (ref 44–72)
NRBC # BLD AUTO: 0 K/UL
NRBC BLD-RTO: 0 /100 WBC
PLATELET # BLD AUTO: 201 K/UL (ref 164–446)
PMV BLD AUTO: 10.8 FL (ref 9–12.9)
POTASSIUM SERPL-SCNC: 3.8 MMOL/L (ref 3.6–5.5)
PROT SERPL-MCNC: 7 G/DL (ref 6–8.2)
RBC # BLD AUTO: 5.8 M/UL (ref 4.7–6.1)
SODIUM SERPL-SCNC: 141 MMOL/L (ref 135–145)
TRIGL SERPL-MCNC: 132 MG/DL (ref 0–149)
TSH SERPL DL<=0.005 MIU/L-ACNC: 1.05 UIU/ML (ref 0.38–5.33)
WBC # BLD AUTO: 4.6 K/UL (ref 4.8–10.8)

## 2022-06-16 PROCEDURE — 84443 ASSAY THYROID STIM HORMONE: CPT

## 2022-06-16 PROCEDURE — 85025 COMPLETE CBC W/AUTO DIFF WBC: CPT

## 2022-06-16 PROCEDURE — 90677 PCV20 VACCINE IM: CPT | Performed by: PHYSICIAN ASSISTANT

## 2022-06-16 PROCEDURE — 99397 PER PM REEVAL EST PAT 65+ YR: CPT | Mod: 25 | Performed by: PHYSICIAN ASSISTANT

## 2022-06-16 PROCEDURE — 36415 COLL VENOUS BLD VENIPUNCTURE: CPT

## 2022-06-16 PROCEDURE — 90471 IMMUNIZATION ADMIN: CPT | Performed by: PHYSICIAN ASSISTANT

## 2022-06-16 PROCEDURE — 80061 LIPID PANEL: CPT

## 2022-06-16 PROCEDURE — 80053 COMPREHEN METABOLIC PANEL: CPT

## 2022-06-16 ASSESSMENT — FIBROSIS 4 INDEX: FIB4 SCORE: 1.52

## 2022-06-16 ASSESSMENT — PATIENT HEALTH QUESTIONNAIRE - PHQ9: CLINICAL INTERPRETATION OF PHQ2 SCORE: 0

## 2022-06-16 NOTE — PROGRESS NOTES
Subjective:     CC:   Chief Complaint   Patient presents with   • Annual Exam   • Requesting Labs       HPI:   Karsten Jaime is a 67 y.o. male who presents for annual exam    Last Colorectal Cancer Screenin  Last Tdap: 2014  Received HPV series: Aged out    Exercise: strenuous regular exercise, aerobic > 3 hours a week  Diet: good      He  has a past medical history of GERD (gastroesophageal reflux disease), History of hiatal hernia (2019), Hyperlipidemia, Hypertension, and IFG (impaired fasting glucose) (5/10/2016).  He  has a past surgical history that includes pr upper gi endoscopy,exam; egd w/endoscopic ultrasound (2013); gastroscopy with biopsy (2013); gastroscopy with biopsy (2014); cataract phaco with iol (Right, 2017); and cataract phaco with iol (Left, 2017).    Family History   Problem Relation Age of Onset   • Genetic Disorder Mother 75        GERD   • Diabetes Father    • Hypertension Brother    • Diabetes Brother      Social History     Tobacco Use   • Smoking status: Never Smoker   • Smokeless tobacco: Never Used   Vaping Use   • Vaping Use: Never used   Substance Use Topics   • Alcohol use: Yes     Comment: Occasionally   • Drug use: No     He  reports being sexually active and has had partner(s) who are female.    Patient Active Problem List    Diagnosis Date Noted   • Prostate cancer (HCC) 2021   • Restless leg syndrome 10/26/2020   • SURINDER (obstructive sleep apnea) 2020   • Seasonal allergic rhinitis due to pollen 05/10/2016   • Pure hypercholesterolemia 2016   • Essential hypertension 2015   • Benign tumor of cardia of stomach 2014     Current Outpatient Medications   Medication Sig Dispense Refill   • triamterene/hctz (MAXZIDE-25/DYAZIDE) 37.5-25 MG Cap TAKE 1 CAPSULE BY MOUTH EVERY DAY 90 Capsule 3   • simvastatin (ZOCOR) 20 MG Tab TAKE 1 TABLET BY MOUTH DAILY IN THE EVENING FOR 90 DAYS 90 Tablet 3   • losartan (COZAAR) 50  "MG Tab TAKE 1 TABLET BY MOUTH EVERY DAY 90 Tablet 3   • fluticasone (FLONASE) 50 MCG/ACT nasal spray INSTILL 2 SPRAY INTO EACH NOSTRIL ONCE DAILY 48 mL 1   • omeprazole (PRILOSEC) 20 MG delayed-release capsule      • ROPINIRole (REQUIP) 0.25 MG Tab TAKE 1 TABLET BY MOUTH EVERY DAY AT BEDTIME AS NEEDED (Patient not taking: Reported on 6/16/2022) 90 tablet 1   • Omega-3 Fatty Acids (OMEGA-3 FISH OIL PO) Take 1 Cap by mouth every day. (Patient not taking: Reported on 6/16/2022)       No current facility-administered medications for this visit.     Allergies   Allergen Reactions   • Aspirin      'can not breathe to good and sneezing'  'can not breathe to good and sneezing'   • Aspirin Effervescent      'can not breath to good and sneezing'  'can not breath to good and sneezing'   • Padmini-[place] Plus Cold-Cough    • Pollen Extract        Review of Systems   Constitutional: Negative for fever, chills and malaise/fatigue.   HENT: Negative for congestion.    Eyes: Negative for pain.   Respiratory: Negative for cough and shortness of breath.    Cardiovascular: Negative for chest pain and leg swelling.   Gastrointestinal: Negative for nausea, vomiting, abdominal pain and diarrhea.   Genitourinary: Negative for dysuria and hematuria.   Skin: Negative for rash.   Neurological: Negative for dizziness, focal weakness and headaches.   Endo/Heme/Allergies: Does not bruise/bleed easily.   Psychiatric/Behavioral: Negative for depression.  The patient is not nervous/anxious.      Objective:   /68 (BP Location: Left arm, Patient Position: Sitting, BP Cuff Size: Adult)   Pulse 61   Temp 36.9 °C (98.4 °F)   Ht 1.753 m (5' 9\")   Wt 83.3 kg (183 lb 9.6 oz)   SpO2 95%   BMI 27.11 kg/m²      Wt Readings from Last 4 Encounters:   06/16/22 83.3 kg (183 lb 9.6 oz)   03/31/22 81.6 kg (180 lb)   06/14/21 83 kg (183 lb)   05/18/21 81.2 kg (179 lb)       Physical Exam:  Constitutional: Well-developed and well-nourished. Not diaphoretic. " No distress.   Skin: Skin is warm and dry. No rash noted.  Head: Atraumatic without lesions.  Eyes: Conjunctivae and extraocular motions are normal. Pupils are equal, round, and reactive to light. No scleral icterus.   Ears:  External ears unremarkable. Tympanic membranes clear and intact.  Nose: Nares patent. Septum midline. Turbinates without erythema nor edema. No discharge.   Mouth/Throat: Tongue normal. Oropharynx is clear and moist. Posterior pharynx without erythema or exudates.  Neck: Supple, trachea midline. Normal range of motion. No thyromegaly present. No lymphadenopathy--cervical or supraclavicular.  Cardiovascular: Regular rate and rhythm, S1 and S2 without murmur, rubs, or gallops.    Respiratory: Effort normal. Clear to auscultation throughout. No adventitious sounds.   Abdomen: Soft, non tender, and without distention. Active bowel sounds in all four quadrants. No rebound, guarding, masses or HSM.  Extremities: No cyanosis, clubbing, erythema, nor edema. Distal pulses intact and symmetric.   Musculoskeletal: All major joints AROM full in all directions without pain.  Neurological: Alert and oriented x 3. Grossly non-focal. Strength and sensation grossly intact. DTRs 2+/3 and symmetric.   Psychiatric:  Behavior, mood, and affect are appropriate.      Assessment and Plan:     1. Well adult exam  -Advised increase physical activity as tolerated and reviewed diet control.  Recommended shingles vaccine at the pharmacy  - CBC WITH DIFFERENTIAL; Future  - Comp Metabolic Panel; Future  - Lipid Profile; Future  - TSH WITH REFLEX TO FT4; Future    2. Prostate cancer (HCC)  -Last PSA level undetectable.  In remission.  Continue to follow-up with urology for monitoring  - CBC WITH DIFFERENTIAL; Future    3. Essential hypertension  -Well-controlled.  Continue current medications  - Comp Metabolic Panel; Future  - Lipid Profile; Future  - TSH WITH REFLEX TO FT4; Future    4. Pure hypercholesterolemia  -Has been  previously well controlled on 20 mg of simvastatin.  Will check below labs and adjust medication if needed pending results  - Comp Metabolic Panel; Future  - Lipid Profile; Future    5. Restless leg syndrome  -Stable.  Uses Requip as needed.    6. SURINDER (obstructive sleep apnea)  -Stable.  Continue CPAP.    7. Need for vaccination  -Immunization given in clinic today  - Pneumococcal Conjugate Vaccine 20-Valent (19 yrs+)      Health maintenance:     Labs per orders  Immunizations per orders  Patient counseled about skin care, diet, supplements, and exercise.  Discussed diet and exercise, colorectal cancer screening and prostate cancer screening     Follow-up: Return in about 6 months (around 12/16/2022) for Medication Check.

## 2022-12-19 ENCOUNTER — OFFICE VISIT (OUTPATIENT)
Dept: MEDICAL GROUP | Age: 67
End: 2022-12-19
Payer: COMMERCIAL

## 2022-12-19 VITALS
RESPIRATION RATE: 16 BRPM | OXYGEN SATURATION: 98 % | HEIGHT: 69 IN | HEART RATE: 68 BPM | WEIGHT: 178 LBS | DIASTOLIC BLOOD PRESSURE: 66 MMHG | TEMPERATURE: 97.8 F | SYSTOLIC BLOOD PRESSURE: 114 MMHG | BODY MASS INDEX: 26.36 KG/M2

## 2022-12-19 DIAGNOSIS — I10 ESSENTIAL HYPERTENSION: ICD-10-CM

## 2022-12-19 DIAGNOSIS — E78.00 PURE HYPERCHOLESTEROLEMIA: ICD-10-CM

## 2022-12-19 DIAGNOSIS — Z23 NEED FOR VACCINATION: ICD-10-CM

## 2022-12-19 DIAGNOSIS — G25.81 RESTLESS LEG SYNDROME: ICD-10-CM

## 2022-12-19 DIAGNOSIS — C61 PROSTATE CANCER (HCC): ICD-10-CM

## 2022-12-19 DIAGNOSIS — Z00.00 BLOOD TESTS FOR ROUTINE GENERAL PHYSICAL EXAMINATION: ICD-10-CM

## 2022-12-19 PROCEDURE — 99214 OFFICE O/P EST MOD 30 MIN: CPT | Mod: 25 | Performed by: PHYSICIAN ASSISTANT

## 2022-12-19 PROCEDURE — 90746 HEPB VACCINE 3 DOSE ADULT IM: CPT | Performed by: PHYSICIAN ASSISTANT

## 2022-12-19 PROCEDURE — 90471 IMMUNIZATION ADMIN: CPT | Performed by: PHYSICIAN ASSISTANT

## 2022-12-19 ASSESSMENT — FIBROSIS 4 INDEX: FIB4 SCORE: 1.21

## 2022-12-19 NOTE — PROGRESS NOTES
cc: Medication review    Subjective:     BRY Jaime is a 67 y.o. male presenting for medication review.  Currently taking 50 mg of losartan in addition to Maxide/Dyazide 37.5-25 mg.  Intermittently checks his blood pressure at home.  Well-controlled.  Denies chest pain, palpitations, shortness of breath, lower extremity edema.    Is currently followed by urology for prostate cancer.  PSA levels have been undetectable.  Following up every 6 months.    He does have prescription for 0.25 mg of Requip.  States he is uses this intermittently for restless leg, does not have to use nightly.       Latest Reference Range & Units 06/16/22 09:59   WBC 4.8 - 10.8 K/uL 4.6 (L)   RBC 4.70 - 6.10 M/uL 5.80   Hemoglobin 14.0 - 18.0 g/dL 17.7   Hematocrit 42.0 - 52.0 % 51.3   MCV 81.4 - 97.8 fL 88.4   MCH 27.0 - 33.0 pg 30.5   MCHC 33.7 - 35.3 g/dL 34.5   RDW 35.9 - 50.0 fL 44.6   Platelet Count 164 - 446 K/uL 201   MPV 9.0 - 12.9 fL 10.8   Neutrophils-Polys 44.00 - 72.00 % 47.50   Neutrophils (Absolute) 1.82 - 7.42 K/uL 2.20   Lymphocytes 22.00 - 41.00 % 40.30   Lymphs (Absolute) 1.00 - 4.80 K/uL 1.87   Monocytes 0.00 - 13.40 % 7.30   Monos (Absolute) 0.00 - 0.85 K/uL 0.34   Eosinophils 0.00 - 6.90 % 4.10   Eos (Absolute) 0.00 - 0.51 K/uL 0.19   Basophils 0.00 - 1.80 % 0.60   Baso (Absolute) 0.00 - 0.12 K/uL 0.03   Immature Granulocytes 0.00 - 0.90 % 0.20   Immature Granulocytes (abs) 0.00 - 0.11 K/uL 0.01   Nucleated RBC /100 WBC 0.00   NRBC (Absolute) K/uL 0.00   Sodium 135 - 145 mmol/L 141   Potassium 3.6 - 5.5 mmol/L 3.8   Chloride 96 - 112 mmol/L 106   Co2 20 - 33 mmol/L 24   Anion Gap 7.0 - 16.0  11.0   Glucose 65 - 99 mg/dL 104 (H)   Bun 8 - 22 mg/dL 15   Creatinine 0.50 - 1.40 mg/dL 0.96   GFR (CKD-EPI) >60 mL/min/1.73 m 2 88   Calcium 8.5 - 10.5 mg/dL 9.0   AST(SGOT) 12 - 45 U/L 14   ALT(SGPT) 2 - 50 U/L 14   Alkaline Phosphatase 30 - 99 U/L 99   Total Bilirubin 0.1 - 1.5 mg/dL 1.0   Albumin 3.2 - 4.9 g/dL  "3.9   Total Protein 6.0 - 8.2 g/dL 7.0   Globulin 1.9 - 3.5 g/dL 3.1   A-G Ratio g/dL 1.3   Cholesterol,Tot 100 - 199 mg/dL 137   Triglycerides 0 - 149 mg/dL 132   HDL >=40 mg/dL 39 !   LDL <100 mg/dL 72   (L): Data is abnormally low  (H): Data is abnormally high  !: Data is abnormal  Review of systems:  See above.       Current Outpatient Medications:     triamterene/hctz (MAXZIDE-25/DYAZIDE) 37.5-25 MG Cap, TAKE 1 CAPSULE BY MOUTH EVERY DAY, Disp: 90 Capsule, Rfl: 3    simvastatin (ZOCOR) 20 MG Tab, TAKE 1 TABLET BY MOUTH DAILY IN THE EVENING FOR 90 DAYS, Disp: 90 Tablet, Rfl: 3    losartan (COZAAR) 50 MG Tab, TAKE 1 TABLET BY MOUTH EVERY DAY, Disp: 90 Tablet, Rfl: 3    fluticasone (FLONASE) 50 MCG/ACT nasal spray, INSTILL 2 SPRAY INTO EACH NOSTRIL ONCE DAILY, Disp: 48 mL, Rfl: 1    ROPINIRole (REQUIP) 0.25 MG Tab, TAKE 1 TABLET BY MOUTH EVERY DAY AT BEDTIME AS NEEDED, Disp: 90 tablet, Rfl: 1    omeprazole (PRILOSEC) 20 MG delayed-release capsule, , Disp: , Rfl:     Allergies, past medical history, past surgical history, family history, social history reviewed and updated    Objective:     Vitals: /66 (BP Location: Left arm, Patient Position: Sitting, BP Cuff Size: Adult)   Pulse 68   Temp 36.6 °C (97.8 °F) (Temporal)   Resp 16   Ht 1.753 m (5' 9\")   Wt 80.7 kg (178 lb)   SpO2 98%   BMI 26.29 kg/m²   General: Alert, pleasant, NAD  HEENT: Normocephalic. Neck supple.  No thyromegaly or masses palpated. No cervical or supraclavicular lymphadenopathy. No carotid bruits   Heart: Regular rate and rhythm.  S1 and S2 normal.  No murmurs appreciated.  Respiratory: Normal respiratory effort.  Clear to auscultation bilaterally.  Skin: Warm, dry, no rashes.  Extremities: No leg edema.  Radial pulses 2+ symmetric  Psych:  Affect/mood is normal, judgement is good, memory is intact, grooming is appropriate.    Assessment/Plan:     Karsten was seen today for lab results.    Diagnoses and all orders for this " visit:    Prostate cancer (HCC)  -Stable.  PSA levels undetectable.  Continue follow-up with urology.    Essential hypertension  -Blood pressure is well controlled.  Continue 50 mg of losartan in addition to Maxide/Dyazide 37.5-25 mg  -     Comp Metabolic Panel; Future  -     Lipid Profile; Future  -     TSH WITH REFLEX TO FT4; Future    Pure hypercholesterolemia  -LDL is well controlled.  Continue 20 mg of simvastatin.  -     Comp Metabolic Panel; Future  -     Lipid Profile; Future    Restless leg syndrome  -Uses Requip intermittently.  Continue as needed.  -     CBC WITH DIFFERENTIAL; Future    Blood tests for routine general physical examination  -     CBC WITH DIFFERENTIAL; Future  -     Comp Metabolic Panel; Future  -     Lipid Profile; Future  -     TSH WITH REFLEX TO FT4; Future    Need for vaccination  -Immunization given in clinic today  -     Hepatitis B Vaccine Adult 20+      Return in about 6 months (around 6/19/2023) for Annual PX, Lab Review.

## 2023-03-23 DIAGNOSIS — I10 ESSENTIAL HYPERTENSION: ICD-10-CM

## 2023-03-23 RX ORDER — SIMVASTATIN 20 MG
TABLET ORAL
Qty: 90 TABLET | Refills: 3 | Status: SHIPPED | OUTPATIENT
Start: 2023-03-23 | End: 2024-01-22 | Stop reason: SDUPTHER

## 2023-03-23 RX ORDER — TRIAMTERENE AND HYDROCHLOROTHIAZIDE 37.5; 25 MG/1; MG/1
CAPSULE ORAL
Qty: 90 CAPSULE | Refills: 3 | Status: SHIPPED | OUTPATIENT
Start: 2023-03-23 | End: 2024-01-22 | Stop reason: SDUPTHER

## 2023-03-23 RX ORDER — LOSARTAN POTASSIUM 50 MG/1
TABLET ORAL
Qty: 90 TABLET | Refills: 3 | Status: SHIPPED | OUTPATIENT
Start: 2023-03-23 | End: 2024-01-22 | Stop reason: SDUPTHER

## 2023-07-11 ENCOUNTER — APPOINTMENT (OUTPATIENT)
Dept: MEDICAL GROUP | Age: 68
End: 2023-07-11
Payer: COMMERCIAL

## 2023-07-12 ENCOUNTER — HOSPITAL ENCOUNTER (OUTPATIENT)
Dept: LAB | Facility: MEDICAL CENTER | Age: 68
End: 2023-07-12
Attending: PHYSICIAN ASSISTANT
Payer: COMMERCIAL

## 2023-07-12 DIAGNOSIS — I10 ESSENTIAL HYPERTENSION: ICD-10-CM

## 2023-07-12 DIAGNOSIS — E78.00 PURE HYPERCHOLESTEROLEMIA: ICD-10-CM

## 2023-07-12 DIAGNOSIS — Z00.00 BLOOD TESTS FOR ROUTINE GENERAL PHYSICAL EXAMINATION: ICD-10-CM

## 2023-07-12 DIAGNOSIS — G25.81 RESTLESS LEG SYNDROME: ICD-10-CM

## 2023-07-12 LAB
ALBUMIN SERPL BCP-MCNC: 4 G/DL (ref 3.2–4.9)
ALBUMIN/GLOB SERPL: 1.3 G/DL
ALP SERPL-CCNC: 87 U/L (ref 30–99)
ALT SERPL-CCNC: 26 U/L (ref 2–50)
ANION GAP SERPL CALC-SCNC: 12 MMOL/L (ref 7–16)
AST SERPL-CCNC: 20 U/L (ref 12–45)
BASOPHILS # BLD AUTO: 0.4 % (ref 0–1.8)
BASOPHILS # BLD: 0.02 K/UL (ref 0–0.12)
BILIRUB SERPL-MCNC: 1.3 MG/DL (ref 0.1–1.5)
BUN SERPL-MCNC: 18 MG/DL (ref 8–22)
CALCIUM ALBUM COR SERPL-MCNC: 8.9 MG/DL (ref 8.5–10.5)
CALCIUM SERPL-MCNC: 8.9 MG/DL (ref 8.5–10.5)
CHLORIDE SERPL-SCNC: 107 MMOL/L (ref 96–112)
CHOLEST SERPL-MCNC: 134 MG/DL (ref 100–199)
CO2 SERPL-SCNC: 23 MMOL/L (ref 20–33)
CREAT SERPL-MCNC: 1.07 MG/DL (ref 0.5–1.4)
EOSINOPHIL # BLD AUTO: 0.27 K/UL (ref 0–0.51)
EOSINOPHIL NFR BLD: 5.4 % (ref 0–6.9)
ERYTHROCYTE [DISTWIDTH] IN BLOOD BY AUTOMATED COUNT: 43.6 FL (ref 35.9–50)
GFR SERPLBLD CREATININE-BSD FMLA CKD-EPI: 76 ML/MIN/1.73 M 2
GLOBULIN SER CALC-MCNC: 3 G/DL (ref 1.9–3.5)
GLUCOSE SERPL-MCNC: 98 MG/DL (ref 65–99)
HCT VFR BLD AUTO: 49.6 % (ref 42–52)
HDLC SERPL-MCNC: 43 MG/DL
HGB BLD-MCNC: 17.2 G/DL (ref 14–18)
IMM GRANULOCYTES # BLD AUTO: 0.01 K/UL (ref 0–0.11)
IMM GRANULOCYTES NFR BLD AUTO: 0.2 % (ref 0–0.9)
LDLC SERPL CALC-MCNC: 66 MG/DL
LYMPHOCYTES # BLD AUTO: 2.01 K/UL (ref 1–4.8)
LYMPHOCYTES NFR BLD: 40.4 % (ref 22–41)
MCH RBC QN AUTO: 30.8 PG (ref 27–33)
MCHC RBC AUTO-ENTMCNC: 34.7 G/DL (ref 32.3–36.5)
MCV RBC AUTO: 88.9 FL (ref 81.4–97.8)
MONOCYTES # BLD AUTO: 0.34 K/UL (ref 0–0.85)
MONOCYTES NFR BLD AUTO: 6.8 % (ref 0–13.4)
NEUTROPHILS # BLD AUTO: 2.32 K/UL (ref 1.82–7.42)
NEUTROPHILS NFR BLD: 46.8 % (ref 44–72)
NRBC # BLD AUTO: 0 K/UL
NRBC BLD-RTO: 0 /100 WBC (ref 0–0.2)
PLATELET # BLD AUTO: 202 K/UL (ref 164–446)
PMV BLD AUTO: 10.5 FL (ref 9–12.9)
POTASSIUM SERPL-SCNC: 3.5 MMOL/L (ref 3.6–5.5)
PROT SERPL-MCNC: 7 G/DL (ref 6–8.2)
RBC # BLD AUTO: 5.58 M/UL (ref 4.7–6.1)
SODIUM SERPL-SCNC: 142 MMOL/L (ref 135–145)
TRIGL SERPL-MCNC: 124 MG/DL (ref 0–149)
TSH SERPL DL<=0.005 MIU/L-ACNC: 1.24 UIU/ML (ref 0.38–5.33)
WBC # BLD AUTO: 5 K/UL (ref 4.8–10.8)

## 2023-07-12 PROCEDURE — 85025 COMPLETE CBC W/AUTO DIFF WBC: CPT

## 2023-07-12 PROCEDURE — 80053 COMPREHEN METABOLIC PANEL: CPT

## 2023-07-12 PROCEDURE — 84443 ASSAY THYROID STIM HORMONE: CPT

## 2023-07-12 PROCEDURE — 80061 LIPID PANEL: CPT

## 2023-07-12 PROCEDURE — 36415 COLL VENOUS BLD VENIPUNCTURE: CPT

## 2023-07-21 ENCOUNTER — OFFICE VISIT (OUTPATIENT)
Dept: MEDICAL GROUP | Age: 68
End: 2023-07-21
Payer: COMMERCIAL

## 2023-07-21 VITALS
HEIGHT: 69 IN | TEMPERATURE: 97 F | SYSTOLIC BLOOD PRESSURE: 110 MMHG | WEIGHT: 184 LBS | DIASTOLIC BLOOD PRESSURE: 60 MMHG | OXYGEN SATURATION: 98 % | RESPIRATION RATE: 16 BRPM | BODY MASS INDEX: 27.25 KG/M2 | HEART RATE: 58 BPM

## 2023-07-21 DIAGNOSIS — K21.9 GASTROESOPHAGEAL REFLUX DISEASE, UNSPECIFIED WHETHER ESOPHAGITIS PRESENT: ICD-10-CM

## 2023-07-21 DIAGNOSIS — Z00.00 WELL ADULT EXAM: ICD-10-CM

## 2023-07-21 DIAGNOSIS — C61 PROSTATE CANCER (HCC): ICD-10-CM

## 2023-07-21 DIAGNOSIS — Z23 NEED FOR VACCINATION: ICD-10-CM

## 2023-07-21 DIAGNOSIS — G47.33 OSA (OBSTRUCTIVE SLEEP APNEA): Chronic | ICD-10-CM

## 2023-07-21 DIAGNOSIS — G25.81 RESTLESS LEG SYNDROME: ICD-10-CM

## 2023-07-21 DIAGNOSIS — E78.00 PURE HYPERCHOLESTEROLEMIA: ICD-10-CM

## 2023-07-21 PROCEDURE — 90746 HEPB VACCINE 3 DOSE ADULT IM: CPT | Performed by: PHYSICIAN ASSISTANT

## 2023-07-21 PROCEDURE — 3078F DIAST BP <80 MM HG: CPT | Performed by: PHYSICIAN ASSISTANT

## 2023-07-21 PROCEDURE — 3074F SYST BP LT 130 MM HG: CPT | Performed by: PHYSICIAN ASSISTANT

## 2023-07-21 PROCEDURE — 99397 PER PM REEVAL EST PAT 65+ YR: CPT | Mod: 25 | Performed by: PHYSICIAN ASSISTANT

## 2023-07-21 PROCEDURE — 90471 IMMUNIZATION ADMIN: CPT | Performed by: PHYSICIAN ASSISTANT

## 2023-07-21 ASSESSMENT — FIBROSIS 4 INDEX: FIB4 SCORE: 1.28

## 2023-07-21 ASSESSMENT — PATIENT HEALTH QUESTIONNAIRE - PHQ9: CLINICAL INTERPRETATION OF PHQ2 SCORE: 0

## 2023-07-21 NOTE — PROGRESS NOTES
Subjective:     CC:   Chief Complaint   Patient presents with    Annual Exam       HPI:   Karsten Cooper is a 68 y.o. male who presents for annual exam    Last Colorectal Cancer Screenin  Last Tdap:   Received HPV series: Aged out    Exercise: sporadic irregular exercise, <half hour walking weekly  Diet: Good-has some room form improvement       Latest Reference Range & Units 23 10:43   WBC 4.8 - 10.8 K/uL 5.0   RBC 4.70 - 6.10 M/uL 5.58   Hemoglobin 14.0 - 18.0 g/dL 17.2   Hematocrit 42.0 - 52.0 % 49.6   MCV 81.4 - 97.8 fL 88.9   MCH 27.0 - 33.0 pg 30.8   MCHC 32.3 - 36.5 g/dL 34.7   RDW 35.9 - 50.0 fL 43.6   Platelet Count 164 - 446 K/uL 202   MPV 9.0 - 12.9 fL 10.5   Neutrophils-Polys 44.00 - 72.00 % 46.80   Neutrophils (Absolute) 1.82 - 7.42 K/uL 2.32   Lymphocytes 22.00 - 41.00 % 40.40   Lymphs (Absolute) 1.00 - 4.80 K/uL 2.01   Monocytes 0.00 - 13.40 % 6.80   Monos (Absolute) 0.00 - 0.85 K/uL 0.34   Eosinophils 0.00 - 6.90 % 5.40   Eos (Absolute) 0.00 - 0.51 K/uL 0.27   Basophils 0.00 - 1.80 % 0.40   Baso (Absolute) 0.00 - 0.12 K/uL 0.02   Immature Granulocytes 0.00 - 0.90 % 0.20   Immature Granulocytes (abs) 0.00 - 0.11 K/uL 0.01   Nucleated RBC 0.00 - 0.20 /100 WBC 0.00   NRBC (Absolute) K/uL 0.00   Sodium 135 - 145 mmol/L 142   Potassium 3.6 - 5.5 mmol/L 3.5 (L)   Chloride 96 - 112 mmol/L 107   Co2 20 - 33 mmol/L 23   Anion Gap 7.0 - 16.0  12.0   Glucose 65 - 99 mg/dL 98   Bun 8 - 22 mg/dL 18   Creatinine 0.50 - 1.40 mg/dL 1.07   GFR (CKD-EPI) >60 mL/min/1.73 m 2 76   Calcium 8.5 - 10.5 mg/dL 8.9   Correct Calcium 8.5 - 10.5 mg/dL 8.9   AST(SGOT) 12 - 45 U/L 20   ALT(SGPT) 2 - 50 U/L 26   Alkaline Phosphatase 30 - 99 U/L 87   Total Bilirubin 0.1 - 1.5 mg/dL 1.3   Albumin 3.2 - 4.9 g/dL 4.0   Total Protein 6.0 - 8.2 g/dL 7.0   Globulin 1.9 - 3.5 g/dL 3.0   A-G Ratio g/dL 1.3   Cholesterol,Tot 100 - 199 mg/dL 134   Triglycerides 0 - 149 mg/dL 124   HDL >=40 mg/dL 43   LDL <100 mg/dL 66    TSH 0.380 - 5.330 uIU/mL 1.240   (L): Data is abnormally low    He  has a past medical history of GERD (gastroesophageal reflux disease), History of hiatal hernia (11/25/2019), Hyperlipidemia, Hypertension, and IFG (impaired fasting glucose) (5/10/2016).  He  has a past surgical history that includes pr upper gi endoscopy,exam; egd w/endoscopic ultrasound (2/21/2013); gastroscopy with biopsy (2/21/2013); gastroscopy with biopsy (1/29/2014); cataract phaco with iol (Right, 7/24/2017); and cataract phaco with iol (Left, 8/14/2017).    Family History   Problem Relation Age of Onset    Genetic Disorder Mother 75        GERD    Diabetes Father     Hypertension Brother     Diabetes Brother      Social History     Tobacco Use    Smoking status: Never    Smokeless tobacco: Never   Vaping Use    Vaping Use: Never used   Substance Use Topics    Alcohol use: Yes     Comment: Occasionally    Drug use: No     He  reports being sexually active and has had partner(s) who are female.    Patient Active Problem List    Diagnosis Date Noted    Prostate cancer (HCC) 08/02/2021    Restless leg syndrome 10/26/2020    SURINDER (obstructive sleep apnea) 07/19/2020    Seasonal allergic rhinitis due to pollen 05/10/2016    Pure hypercholesterolemia 02/19/2016    Essential hypertension 07/23/2015    Benign tumor of cardia of stomach 01/29/2014     Current Outpatient Medications   Medication Sig Dispense Refill    triamterene/hctz (MAXZIDE-25/DYAZIDE) 37.5-25 MG Cap TAKE 1 CAPSULE BY MOUTH EVERY DAY 90 Capsule 3    losartan (COZAAR) 50 MG Tab TAKE 1 TABLET BY MOUTH EVERY DAY 90 Tablet 3    simvastatin (ZOCOR) 20 MG Tab TAKE 1 TABLET BY MOUTH DAILY IN THE EVENING FOR 90 DAYS 90 Tablet 3    fluticasone (FLONASE) 50 MCG/ACT nasal spray INSTILL 2 SPRAY INTO EACH NOSTRIL ONCE DAILY 48 mL 1    ROPINIRole (REQUIP) 0.25 MG Tab TAKE 1 TABLET BY MOUTH EVERY DAY AT BEDTIME AS NEEDED 90 tablet 1    omeprazole (PRILOSEC) 20 MG delayed-release capsule     "    No current facility-administered medications for this visit.     Allergies   Allergen Reactions    Aspirin      'can not breathe to good and sneezing'  'can not breathe to good and sneezing'    Aspirin Effervescent      'can not breath to good and sneezing'  'can not breath to good and sneezing'    [place] Plus Cold-Cough     Pollen Extract        Review of Systems   Constitutional: Negative for fever, chills and malaise/fatigue.   HENT: Negative for congestion.    Eyes: Negative for pain.   Respiratory: Negative for cough and shortness of breath.    Cardiovascular: Negative for chest pain and leg swelling.   Gastrointestinal: Negative for nausea, vomiting, abdominal pain and diarrhea.   Genitourinary: Negative for dysuria and hematuria.   Skin: Negative for rash.   Neurological: Negative for dizziness, focal weakness and headaches.   Endo/Heme/Allergies: Does not bruise/bleed easily.   Psychiatric/Behavioral: Negative for depression.  The patient is not nervous/anxious.      Objective:   /60 (BP Location: Left arm, Patient Position: Sitting, BP Cuff Size: Adult)   Pulse (!) 58   Temp 36.1 °C (97 °F) (Temporal)   Resp 16   Ht 1.753 m (5' 9\")   Wt 83.5 kg (184 lb)   SpO2 98%   BMI 27.17 kg/m²      Wt Readings from Last 4 Encounters:   07/21/23 83.5 kg (184 lb)   12/19/22 80.7 kg (178 lb)   06/16/22 83.3 kg (183 lb 9.6 oz)   03/31/22 81.6 kg (180 lb)         Physical Exam:  Constitutional: Well-developed and well-nourished. Not diaphoretic. No distress.   Skin: Skin is warm and dry. No rash noted.  Head: Atraumatic without lesions.  Eyes: Conjunctivae and extraocular motions are normal. Pupils are equal, round, and reactive to light. No scleral icterus.   Ears:  External ears unremarkable. Tympanic membranes clear and intact.  Nose: Nares patent. Septum midline. Turbinates without erythema nor edema. No discharge.   Mouth/Throat: Tongue normal. Oropharynx is clear and moist. Posterior pharynx " without erythema or exudates.  Neck: Supple, trachea midline. Normal range of motion. No thyromegaly present. No lymphadenopathy--cervical or supraclavicular.  Cardiovascular: Regular rate and rhythm, S1 and S2 without murmur, rubs, or gallops.    Respiratory: Effort normal. Clear to auscultation throughout. No adventitious sounds.   Abdomen: Soft, non tender, and without distention. Active bowel sounds in all four quadrants. No rebound, guarding, masses or HSM.  Extremities: No cyanosis, clubbing, erythema, nor edema. Distal pulses intact and symmetric.   Musculoskeletal: All major joints AROM full in all directions without pain.  Neurological: Alert and oriented x 3. Grossly non-focal. Strength and sensation grossly intact. DTRs 2+/3 and symmetric.   Psychiatric:  Behavior, mood, and affect are appropriate.      Assessment and Plan:     1. Well adult exam  Advised increase physical activity as tolerated.  Reviewed diet control.    2. Prostate cancer (HCC)  -Stable.  Currently followed by urology every 6 months    3. Pure hypercholesterolemia  -Controlled.  Continue 20 mg of simvastatin    4. Restless leg syndrome  -Controlled.  Continue 0.25 mg of Requip as needed    5. SURINDER (obstructive sleep apnea)  Stable.  Continue CPAP    6. Gastroesophageal reflux disease, unspecified whether esophagitis present  Stable.  Continue 20 mg of omeprazole as needed    7. Need for vaccination  -Immunization given in clinic today.  - Hepatitis B Vaccine Adult 20+      Health maintenance:     Labs per orders  Immunizations per orders  Patient counseled about skin care, diet, supplements, and exercise.  Discussed diet and exercise, colorectal cancer screening, and prostate cancer screening     Follow-up: Return in about 6 months (around 1/21/2024) for Medication Check.

## 2024-01-17 ENCOUNTER — TELEPHONE (OUTPATIENT)
Dept: MEDICAL GROUP | Age: 69
End: 2024-01-17
Payer: COMMERCIAL

## 2024-01-22 ENCOUNTER — OFFICE VISIT (OUTPATIENT)
Dept: MEDICAL GROUP | Age: 69
End: 2024-01-22
Payer: COMMERCIAL

## 2024-01-22 VITALS
DIASTOLIC BLOOD PRESSURE: 80 MMHG | BODY MASS INDEX: 27.13 KG/M2 | OXYGEN SATURATION: 97 % | TEMPERATURE: 97.5 F | HEART RATE: 71 BPM | HEIGHT: 69 IN | WEIGHT: 183.2 LBS | SYSTOLIC BLOOD PRESSURE: 128 MMHG

## 2024-01-22 DIAGNOSIS — I10 ESSENTIAL HYPERTENSION: ICD-10-CM

## 2024-01-22 DIAGNOSIS — K21.9 GASTROESOPHAGEAL REFLUX DISEASE, UNSPECIFIED WHETHER ESOPHAGITIS PRESENT: ICD-10-CM

## 2024-01-22 DIAGNOSIS — E78.00 PURE HYPERCHOLESTEROLEMIA: ICD-10-CM

## 2024-01-22 DIAGNOSIS — Z23 NEED FOR VACCINATION: ICD-10-CM

## 2024-01-22 DIAGNOSIS — Z00.00 BLOOD TESTS FOR ROUTINE GENERAL PHYSICAL EXAMINATION: ICD-10-CM

## 2024-01-22 DIAGNOSIS — G25.81 RESTLESS LEG SYNDROME: ICD-10-CM

## 2024-01-22 PROCEDURE — 3074F SYST BP LT 130 MM HG: CPT | Performed by: PHYSICIAN ASSISTANT

## 2024-01-22 PROCEDURE — 90471 IMMUNIZATION ADMIN: CPT | Performed by: PHYSICIAN ASSISTANT

## 2024-01-22 PROCEDURE — 90746 HEPB VACCINE 3 DOSE ADULT IM: CPT | Performed by: PHYSICIAN ASSISTANT

## 2024-01-22 PROCEDURE — 3079F DIAST BP 80-89 MM HG: CPT | Performed by: PHYSICIAN ASSISTANT

## 2024-01-22 PROCEDURE — 99214 OFFICE O/P EST MOD 30 MIN: CPT | Mod: 25 | Performed by: PHYSICIAN ASSISTANT

## 2024-01-22 RX ORDER — SIMVASTATIN 20 MG
20 TABLET ORAL NIGHTLY
Qty: 100 TABLET | Refills: 3 | Status: SHIPPED | OUTPATIENT
Start: 2024-01-22

## 2024-01-22 RX ORDER — OMEPRAZOLE 20 MG/1
20 CAPSULE, DELAYED RELEASE ORAL DAILY
Qty: 100 CAPSULE | Refills: 3 | Status: SHIPPED | OUTPATIENT
Start: 2024-01-22

## 2024-01-22 RX ORDER — LOSARTAN POTASSIUM 50 MG/1
50 TABLET ORAL
Qty: 100 TABLET | Refills: 3 | Status: SHIPPED | OUTPATIENT
Start: 2024-01-22

## 2024-01-22 RX ORDER — TRIAMTERENE AND HYDROCHLOROTHIAZIDE 37.5; 25 MG/1; MG/1
1 CAPSULE ORAL
Qty: 100 CAPSULE | Refills: 3 | Status: SHIPPED | OUTPATIENT
Start: 2024-01-22

## 2024-01-22 ASSESSMENT — FIBROSIS 4 INDEX: FIB4 SCORE: 1.28

## 2024-01-22 ASSESSMENT — PATIENT HEALTH QUESTIONNAIRE - PHQ9: CLINICAL INTERPRETATION OF PHQ2 SCORE: 0

## 2024-01-22 NOTE — PROGRESS NOTES
"cc: Medication review    Subjective:     HPI  Karsten Cooper is a 68 y.o. male presenting for medication review.    Is currently taking 0.25 mg of Requip.  Takes this intermittently for restless legs.  Has not had to use this for the past few months.    Blood pressure has been well-controlled on 50 mg of losartan in addition to Maxide/Dyazide 37.5-25 mg.    He does need a refill of his omeprazole.  Does take this daily.      Review of systems:  See above.       Current Outpatient Medications:     omeprazole (PRILOSEC) 20 MG delayed-release capsule, Take 1 Capsule by mouth every day., Disp: 100 Capsule, Rfl: 3    Glucosamine HCl (GLUCOSAMINE PO), Take  by mouth., Disp: , Rfl:     losartan (COZAAR) 50 MG Tab, Take 1 Tablet by mouth every day., Disp: 100 Tablet, Rfl: 3    simvastatin (ZOCOR) 20 MG Tab, Take 1 Tablet by mouth every evening., Disp: 100 Tablet, Rfl: 3    triamterene/hctz (MAXZIDE-25/DYAZIDE) 37.5-25 MG Cap, Take 1 Capsule by mouth every day., Disp: 100 Capsule, Rfl: 3    fluticasone (FLONASE) 50 MCG/ACT nasal spray, INSTILL 2 SPRAY INTO EACH NOSTRIL ONCE DAILY, Disp: 48 mL, Rfl: 1    ROPINIRole (REQUIP) 0.25 MG Tab, TAKE 1 TABLET BY MOUTH EVERY DAY AT BEDTIME AS NEEDED, Disp: 90 tablet, Rfl: 1    Allergies, past medical history, past surgical history, family history, social history reviewed and updated    Objective:     Vitals: /80 (BP Location: Right arm, Patient Position: Sitting, BP Cuff Size: Large adult)   Pulse 71   Temp 36.4 °C (97.5 °F) (Temporal)   Ht 1.753 m (5' 9\")   Wt 83.1 kg (183 lb 3.2 oz)   SpO2 97%   BMI 27.05 kg/m²   General: Alert, pleasant, NAD  HEENT: Normocephalic. Neck supple.  No thyromegaly or masses palpated. No cervical or supraclavicular lymphadenopathy. No carotid bruits   Heart: Regular rate and rhythm.  S1 and S2 normal.  No murmurs appreciated.  Respiratory: Normal respiratory effort.  Clear to auscultation bilaterally.  Skin: Warm, dry, no " rashes.  Extremities: No leg edema.  Radial pulses 2+ symmetric  Psych:  Affect/mood is normal, judgement is good, memory is intact, grooming is appropriate.    Assessment/Plan:     Karsten was seen today for follow-up.    Diagnoses and all orders for this visit:    Essential hypertension  Controlled.  Continue current medications.  Advised intermittent BP checks  -     TSH WITH REFLEX TO FT4; Future  -     Lipid Profile; Future  -     Comp Metabolic Panel; Future  -     losartan (COZAAR) 50 MG Tab; Take 1 Tablet by mouth every day.  -     triamterene/hctz (MAXZIDE-25/DYAZIDE) 37.5-25 MG Cap; Take 1 Capsule by mouth every day.    Restless leg syndrome  -Stable.  Continue 0.25 mg of Requip as needed  -     CBC WITH DIFFERENTIAL; Future    Gastroesophageal reflux disease, unspecified whether esophagitis present  -Stable.  Continue 20 mg of omeprazole  -     omeprazole (PRILOSEC) 20 MG delayed-release capsule; Take 1 Capsule by mouth every day.    Pure hypercholesterolemia  -Has been well-controlled on 20 mg of Lipitor.  Will check lipid panel.  Adjust medication if needed pending results  -     Lipid Profile; Future  -     simvastatin (ZOCOR) 20 MG Tab; Take 1 Tablet by mouth every evening.    Blood tests for routine general physical examination  -     TSH WITH REFLEX TO FT4; Future  -     Lipid Profile; Future  -     Comp Metabolic Panel; Future  -     CBC WITH DIFFERENTIAL; Future    Need for vaccination  -Immunization given in clinic today  -     Hepatitis B Vaccine Adult 20+        Return in about 6 months (around 7/22/2024) for Annual PX.

## 2024-07-10 ENCOUNTER — HOSPITAL ENCOUNTER (OUTPATIENT)
Dept: LAB | Facility: MEDICAL CENTER | Age: 69
End: 2024-07-10
Attending: PHYSICIAN ASSISTANT
Payer: COMMERCIAL

## 2024-07-10 DIAGNOSIS — I10 ESSENTIAL HYPERTENSION: ICD-10-CM

## 2024-07-10 DIAGNOSIS — Z00.00 BLOOD TESTS FOR ROUTINE GENERAL PHYSICAL EXAMINATION: ICD-10-CM

## 2024-07-10 DIAGNOSIS — G25.81 RESTLESS LEG SYNDROME: ICD-10-CM

## 2024-07-10 DIAGNOSIS — E78.00 PURE HYPERCHOLESTEROLEMIA: ICD-10-CM

## 2024-07-10 LAB
ALBUMIN SERPL BCP-MCNC: 4 G/DL (ref 3.2–4.9)
ALBUMIN/GLOB SERPL: 1.5 G/DL
ALP SERPL-CCNC: 83 U/L (ref 30–99)
ALT SERPL-CCNC: 28 U/L (ref 2–50)
ANION GAP SERPL CALC-SCNC: 11 MMOL/L (ref 7–16)
AST SERPL-CCNC: 20 U/L (ref 12–45)
BASOPHILS # BLD AUTO: 0.6 % (ref 0–1.8)
BASOPHILS # BLD: 0.03 K/UL (ref 0–0.12)
BILIRUB SERPL-MCNC: 1.2 MG/DL (ref 0.1–1.5)
BUN SERPL-MCNC: 18 MG/DL (ref 8–22)
CALCIUM ALBUM COR SERPL-MCNC: 8.6 MG/DL (ref 8.5–10.5)
CALCIUM SERPL-MCNC: 8.6 MG/DL (ref 8.5–10.5)
CHLORIDE SERPL-SCNC: 107 MMOL/L (ref 96–112)
CHOLEST SERPL-MCNC: 132 MG/DL (ref 100–199)
CO2 SERPL-SCNC: 22 MMOL/L (ref 20–33)
CREAT SERPL-MCNC: 1.09 MG/DL (ref 0.5–1.4)
EOSINOPHIL # BLD AUTO: 0.33 K/UL (ref 0–0.51)
EOSINOPHIL NFR BLD: 6.5 % (ref 0–6.9)
ERYTHROCYTE [DISTWIDTH] IN BLOOD BY AUTOMATED COUNT: 45.1 FL (ref 35.9–50)
GFR SERPLBLD CREATININE-BSD FMLA CKD-EPI: 74 ML/MIN/1.73 M 2
GLOBULIN SER CALC-MCNC: 2.7 G/DL (ref 1.9–3.5)
GLUCOSE SERPL-MCNC: 108 MG/DL (ref 65–99)
HCT VFR BLD AUTO: 48.9 % (ref 42–52)
HDLC SERPL-MCNC: 38 MG/DL
HGB BLD-MCNC: 17.1 G/DL (ref 14–18)
IMM GRANULOCYTES # BLD AUTO: 0.01 K/UL (ref 0–0.11)
IMM GRANULOCYTES NFR BLD AUTO: 0.2 % (ref 0–0.9)
LDLC SERPL CALC-MCNC: 55 MG/DL
LYMPHOCYTES # BLD AUTO: 2 K/UL (ref 1–4.8)
LYMPHOCYTES NFR BLD: 39.6 % (ref 22–41)
MCH RBC QN AUTO: 31.6 PG (ref 27–33)
MCHC RBC AUTO-ENTMCNC: 35 G/DL (ref 32.3–36.5)
MCV RBC AUTO: 90.4 FL (ref 81.4–97.8)
MONOCYTES # BLD AUTO: 0.36 K/UL (ref 0–0.85)
MONOCYTES NFR BLD AUTO: 7.1 % (ref 0–13.4)
NEUTROPHILS # BLD AUTO: 2.32 K/UL (ref 1.82–7.42)
NEUTROPHILS NFR BLD: 46 % (ref 44–72)
NRBC # BLD AUTO: 0 K/UL
NRBC BLD-RTO: 0 /100 WBC (ref 0–0.2)
PLATELET # BLD AUTO: 202 K/UL (ref 164–446)
PMV BLD AUTO: 10.9 FL (ref 9–12.9)
POTASSIUM SERPL-SCNC: 3.6 MMOL/L (ref 3.6–5.5)
PROT SERPL-MCNC: 6.7 G/DL (ref 6–8.2)
RBC # BLD AUTO: 5.41 M/UL (ref 4.7–6.1)
SODIUM SERPL-SCNC: 140 MMOL/L (ref 135–145)
TRIGL SERPL-MCNC: 194 MG/DL (ref 0–149)
TSH SERPL DL<=0.005 MIU/L-ACNC: 1.34 UIU/ML (ref 0.38–5.33)
WBC # BLD AUTO: 5.1 K/UL (ref 4.8–10.8)

## 2024-07-10 PROCEDURE — 36415 COLL VENOUS BLD VENIPUNCTURE: CPT

## 2024-07-10 PROCEDURE — 84443 ASSAY THYROID STIM HORMONE: CPT

## 2024-07-10 PROCEDURE — 80061 LIPID PANEL: CPT

## 2024-07-10 PROCEDURE — 85025 COMPLETE CBC W/AUTO DIFF WBC: CPT

## 2024-07-10 PROCEDURE — 80053 COMPREHEN METABOLIC PANEL: CPT

## 2024-07-23 ENCOUNTER — OFFICE VISIT (OUTPATIENT)
Dept: MEDICAL GROUP | Age: 69
End: 2024-07-23
Payer: COMMERCIAL

## 2024-07-23 VITALS
TEMPERATURE: 97.5 F | WEIGHT: 185 LBS | RESPIRATION RATE: 16 BRPM | HEART RATE: 68 BPM | OXYGEN SATURATION: 94 % | HEIGHT: 69 IN | DIASTOLIC BLOOD PRESSURE: 60 MMHG | BODY MASS INDEX: 27.4 KG/M2 | SYSTOLIC BLOOD PRESSURE: 110 MMHG

## 2024-07-23 DIAGNOSIS — R73.01 ELEVATED FASTING GLUCOSE: ICD-10-CM

## 2024-07-23 DIAGNOSIS — G25.81 RESTLESS LEG SYNDROME: ICD-10-CM

## 2024-07-23 DIAGNOSIS — K21.9 GASTROESOPHAGEAL REFLUX DISEASE, UNSPECIFIED WHETHER ESOPHAGITIS PRESENT: ICD-10-CM

## 2024-07-23 DIAGNOSIS — Z85.46 HISTORY OF PROSTATE CANCER: ICD-10-CM

## 2024-07-23 DIAGNOSIS — Z00.00 WELL ADULT EXAM: ICD-10-CM

## 2024-07-23 DIAGNOSIS — E78.00 PURE HYPERCHOLESTEROLEMIA: ICD-10-CM

## 2024-07-23 DIAGNOSIS — Z23 NEED FOR VACCINATION: ICD-10-CM

## 2024-07-23 DIAGNOSIS — I10 ESSENTIAL HYPERTENSION: ICD-10-CM

## 2024-07-23 PROBLEM — N52.31 ERECTILE DYSFUNCTION FOLLOWING RADICAL PROSTATECTOMY: Status: ACTIVE | Noted: 2024-05-15

## 2024-07-23 PROCEDURE — 90471 IMMUNIZATION ADMIN: CPT | Performed by: PHYSICIAN ASSISTANT

## 2024-07-23 PROCEDURE — 3074F SYST BP LT 130 MM HG: CPT | Performed by: PHYSICIAN ASSISTANT

## 2024-07-23 PROCEDURE — 90715 TDAP VACCINE 7 YRS/> IM: CPT | Performed by: PHYSICIAN ASSISTANT

## 2024-07-23 PROCEDURE — 99397 PER PM REEVAL EST PAT 65+ YR: CPT | Mod: 25 | Performed by: PHYSICIAN ASSISTANT

## 2024-07-23 PROCEDURE — 3078F DIAST BP <80 MM HG: CPT | Performed by: PHYSICIAN ASSISTANT

## 2024-07-23 RX ORDER — OMEPRAZOLE 20 MG/1
1 CAPSULE, DELAYED RELEASE ORAL
COMMUNITY
End: 2024-07-23

## 2024-07-23 ASSESSMENT — FIBROSIS 4 INDEX: FIB4 SCORE: 1.25

## 2024-09-18 ENCOUNTER — OFFICE VISIT (OUTPATIENT)
Dept: URGENT CARE | Facility: CLINIC | Age: 69
End: 2024-09-18
Payer: COMMERCIAL

## 2024-09-18 VITALS
SYSTOLIC BLOOD PRESSURE: 124 MMHG | RESPIRATION RATE: 18 BRPM | WEIGHT: 188 LBS | HEART RATE: 56 BPM | TEMPERATURE: 97.3 F | BODY MASS INDEX: 27.85 KG/M2 | HEIGHT: 69 IN | DIASTOLIC BLOOD PRESSURE: 82 MMHG | OXYGEN SATURATION: 96 %

## 2024-09-18 DIAGNOSIS — H61.21 IMPACTED CERUMEN OF RIGHT EAR: ICD-10-CM

## 2024-09-18 PROCEDURE — 99213 OFFICE O/P EST LOW 20 MIN: CPT | Performed by: STUDENT IN AN ORGANIZED HEALTH CARE EDUCATION/TRAINING PROGRAM

## 2024-09-18 PROCEDURE — 3079F DIAST BP 80-89 MM HG: CPT | Performed by: STUDENT IN AN ORGANIZED HEALTH CARE EDUCATION/TRAINING PROGRAM

## 2024-09-18 PROCEDURE — 3074F SYST BP LT 130 MM HG: CPT | Performed by: STUDENT IN AN ORGANIZED HEALTH CARE EDUCATION/TRAINING PROGRAM

## 2024-09-18 ASSESSMENT — FIBROSIS 4 INDEX: FIB4 SCORE: 1.25

## 2024-09-18 NOTE — PROGRESS NOTES
Subjective:   Karsten Cooper is a 69 y.o. male who presents for Ear Pain (7 DAYS)      HPI:  69-year-old male presents to urgent care for 7 days of intermittent right ear discomfort/fullness.  No trauma to the ear.  Has not use any wax removal kits.  No ear discharge.  No fever, chills, headache, or dizziness.    Medications:    fluticasone  GLUCOSAMINE PO  losartan Tabs  omeprazole  ROPINIRole Tabs  simvastatin Tabs  triamterene/hctz Caps    Allergies: Aspirin, Aspirin effervescent, Padmini-seltzer plus cold-cough, Apap-cpm-dm-pe, and Pollen extract    Problem List: Karsetn Cooper does not have any pertinent problems on file.    Surgical History:  Past Surgical History:   Procedure Laterality Date    CATARACT PHACO WITH IOL Left 8/14/2017    Procedure: CATARACT PHACO WITH IOL;  Surgeon: Dwayne Ching M.D.;  Location: SURGERY SAME DAY [facility];  Service:     CATARACT PHACO WITH IOL Right 7/24/2017    Procedure: CATARACT PHACO WITH IOL KPE WITH PLANO SUPERIOR;  Surgeon: Dwayne Ching M.D.;  Location: SURGERY SAME DAY [facility];  Service:     GASTROSCOPY WITH BIOPSY  1/29/2014    Performed by Nick Salas M.D. at [facility]    EGD W/ENDOSCOPIC ULTRASOUND  2/21/2013    Performed by Nick Salas M.D. at [facility]    GASTROSCOPY WITH BIOPSY  2/21/2013    Performed by Nick Salas M.D. at [facility]    OR UPPER GI ENDOSCOPY,EXAM         Past Social Hx: Karsten Cooper  reports that he has never smoked. He has never used smokeless tobacco. He reports current alcohol use. He reports that he does not use drugs.     Past Family Hx:  Karsten oCoper family history includes Diabetes in his brother and father; Genetic Disorder (age of onset: 75) in his mother; Hypertension in his brother.     Problem list, medications, and allergies reviewed by myself today in Epic.     Objective:     /82   Pulse (!) 56   Temp 36.3 °C (97.3 °F) (Temporal)    "Resp 18   Ht 1.753 m (5' 9\")   Wt 85.3 kg (188 lb)   SpO2 96%   BMI 27.76 kg/m²     Physical Exam  HENT:      Right Ear: Tympanic membrane normal. There is impacted cerumen.      Left Ear: Tympanic membrane normal.      Nose: No congestion or rhinorrhea.   Cardiovascular:      Rate and Rhythm: Normal rate.      Pulses: Normal pulses.   Pulmonary:      Effort: Pulmonary effort is normal.         Assessment/Plan:     Diagnosis and associated orders:     1. Impacted cerumen of right ear           Comments/MDM:     Physical exam shows impacted cerumen to the right ear.  Ear lavage conducted by medical assistant with movement of cerumen faction without complication.  Reevaluation shows no signs of otitis externa or otitis media.  No additional treatment needed at this time.         Differential diagnosis, natural history, supportive care, and indications for immediate follow-up discussed.    Advised the patient to follow-up with the primary care physician for recheck, reevaluation, and consideration of further management.    Please note that this dictation was created using voice recognition software. I have made a reasonable attempt to correct obvious errors, but I expect that there are errors of grammar and possibly content that I did not discover before finalizing the note.    Electronically signed by Thom Betancourt PA-C.      "

## 2025-03-01 DIAGNOSIS — I10 ESSENTIAL HYPERTENSION: ICD-10-CM

## 2025-03-04 ENCOUNTER — HOSPITAL ENCOUNTER (OUTPATIENT)
Dept: LAB | Facility: MEDICAL CENTER | Age: 70
End: 2025-03-04
Attending: PHYSICIAN ASSISTANT
Payer: COMMERCIAL

## 2025-03-04 DIAGNOSIS — R73.01 ELEVATED FASTING GLUCOSE: ICD-10-CM

## 2025-03-04 LAB
EST. AVERAGE GLUCOSE BLD GHB EST-MCNC: 126 MG/DL
HBA1C MFR BLD: 6 % (ref 4–5.6)

## 2025-03-04 PROCEDURE — 80053 COMPREHEN METABOLIC PANEL: CPT

## 2025-03-04 PROCEDURE — 83036 HEMOGLOBIN GLYCOSYLATED A1C: CPT

## 2025-03-04 PROCEDURE — 36415 COLL VENOUS BLD VENIPUNCTURE: CPT

## 2025-03-04 RX ORDER — LOSARTAN POTASSIUM 50 MG/1
50 TABLET ORAL
Qty: 90 TABLET | Refills: 3 | Status: SHIPPED | OUTPATIENT
Start: 2025-03-04

## 2025-03-05 LAB
ALBUMIN SERPL BCP-MCNC: 3.9 G/DL (ref 3.2–4.9)
ALBUMIN/GLOB SERPL: 1.3 G/DL
ALP SERPL-CCNC: 89 U/L (ref 30–99)
ALT SERPL-CCNC: 25 U/L (ref 2–50)
ANION GAP SERPL CALC-SCNC: 12 MMOL/L (ref 7–16)
AST SERPL-CCNC: 24 U/L (ref 12–45)
BILIRUB SERPL-MCNC: 1.1 MG/DL (ref 0.1–1.5)
BUN SERPL-MCNC: 17 MG/DL (ref 8–22)
CALCIUM ALBUM COR SERPL-MCNC: 8.7 MG/DL (ref 8.5–10.5)
CALCIUM SERPL-MCNC: 8.6 MG/DL (ref 8.5–10.5)
CHLORIDE SERPL-SCNC: 107 MMOL/L (ref 96–112)
CO2 SERPL-SCNC: 22 MMOL/L (ref 20–33)
CREAT SERPL-MCNC: 1.14 MG/DL (ref 0.5–1.4)
GFR SERPLBLD CREATININE-BSD FMLA CKD-EPI: 70 ML/MIN/1.73 M 2
GLOBULIN SER CALC-MCNC: 3 G/DL (ref 1.9–3.5)
GLUCOSE SERPL-MCNC: 102 MG/DL (ref 65–99)
POTASSIUM SERPL-SCNC: 3.9 MMOL/L (ref 3.6–5.5)
PROT SERPL-MCNC: 6.9 G/DL (ref 6–8.2)
SODIUM SERPL-SCNC: 141 MMOL/L (ref 135–145)

## 2025-03-06 ENCOUNTER — RESULTS FOLLOW-UP (OUTPATIENT)
Dept: MEDICAL GROUP | Age: 70
End: 2025-03-06
Payer: COMMERCIAL

## 2025-03-11 ENCOUNTER — APPOINTMENT (OUTPATIENT)
Dept: MEDICAL GROUP | Age: 70
End: 2025-03-11
Payer: COMMERCIAL

## 2025-03-11 VITALS
SYSTOLIC BLOOD PRESSURE: 122 MMHG | HEART RATE: 70 BPM | WEIGHT: 185 LBS | BODY MASS INDEX: 27.4 KG/M2 | TEMPERATURE: 97.4 F | HEIGHT: 69 IN | OXYGEN SATURATION: 97 % | DIASTOLIC BLOOD PRESSURE: 70 MMHG

## 2025-03-11 DIAGNOSIS — E78.00 PURE HYPERCHOLESTEROLEMIA: ICD-10-CM

## 2025-03-11 DIAGNOSIS — R73.01 ELEVATED FASTING GLUCOSE: ICD-10-CM

## 2025-03-11 DIAGNOSIS — Z00.00 BLOOD TESTS FOR ROUTINE GENERAL PHYSICAL EXAMINATION: ICD-10-CM

## 2025-03-11 DIAGNOSIS — J30.1 SEASONAL ALLERGIC RHINITIS DUE TO POLLEN: ICD-10-CM

## 2025-03-11 DIAGNOSIS — G25.81 RESTLESS LEG SYNDROME: ICD-10-CM

## 2025-03-11 DIAGNOSIS — I10 ESSENTIAL HYPERTENSION: ICD-10-CM

## 2025-03-11 PROCEDURE — 99214 OFFICE O/P EST MOD 30 MIN: CPT | Performed by: PHYSICIAN ASSISTANT

## 2025-03-11 PROCEDURE — 3074F SYST BP LT 130 MM HG: CPT | Performed by: PHYSICIAN ASSISTANT

## 2025-03-11 PROCEDURE — 3078F DIAST BP <80 MM HG: CPT | Performed by: PHYSICIAN ASSISTANT

## 2025-03-11 RX ORDER — FLUTICASONE PROPIONATE 50 MCG
2 SPRAY, SUSPENSION (ML) NASAL DAILY
Qty: 48 ML | Refills: 1 | Status: SHIPPED | OUTPATIENT
Start: 2025-03-11 | End: 2025-03-13

## 2025-03-11 ASSESSMENT — PATIENT HEALTH QUESTIONNAIRE - PHQ9: CLINICAL INTERPRETATION OF PHQ2 SCORE: 0

## 2025-03-11 ASSESSMENT — FIBROSIS 4 INDEX: FIB4 SCORE: 1.59

## 2025-03-11 NOTE — PROGRESS NOTES
cc: Medication check and lab review    Subjective:     HPI    History of Present Illness  The patient is a 69-year-old male presenting for medication check and lab review    Fasting blood sugar slightly elevated.  He walks intermittently for exercise, but when is cold outside does not do any activity.  He does have a stationary bike at home, plans to start using this.  Does have room for improvement in his diet tends to eat more carbohydrates.    Blood pressure has been well-controlled.  He does not monitor his blood pressure at home.    He does have restless legs.  Takes 0.25 mg of Requip when needed.  Does not need nightly.        Review of systems:  See above.    Latest Reference Range & Units 03/04/25 10:18   Sodium 135 - 145 mmol/L 141   Potassium 3.6 - 5.5 mmol/L 3.9   Chloride 96 - 112 mmol/L 107   Co2 20 - 33 mmol/L 22   Anion Gap 7.0 - 16.0  12.0   Glucose 65 - 99 mg/dL 102 (H)   Bun 8 - 22 mg/dL 17   Creatinine 0.50 - 1.40 mg/dL 1.14   GFR (CKD-EPI) >60 mL/min/1.73 m 2 70   Calcium 8.5 - 10.5 mg/dL 8.6   Correct Calcium 8.5 - 10.5 mg/dL 8.7   AST(SGOT) 12 - 45 U/L 24   ALT(SGPT) 2 - 50 U/L 25   Alkaline Phosphatase 30 - 99 U/L 89   Total Bilirubin 0.1 - 1.5 mg/dL 1.1   Albumin 3.2 - 4.9 g/dL 3.9   Total Protein 6.0 - 8.2 g/dL 6.9   Globulin 1.9 - 3.5 g/dL 3.0   A-G Ratio g/dL 1.3   Glycohemoglobin 4.0 - 5.6 % 6.0 (H)   Estim. Avg Glu mg/dL 126   (H): Data is abnormally high    Current Outpatient Medications:     fluticasone (FLONASE) 50 MCG/ACT nasal spray, Administer 2 Sprays into affected nostril(S) every day., Disp: 48 mL, Rfl: 1    losartan (COZAAR) 50 MG Tab, TAKE 1 TABLET BY MOUTH EVERY DAY, Disp: 90 Tablet, Rfl: 3    omeprazole (PRILOSEC) 20 MG delayed-release capsule, Take 1 Capsule by mouth every day., Disp: 100 Capsule, Rfl: 3    Glucosamine HCl (GLUCOSAMINE PO), Take  by mouth., Disp: , Rfl:     simvastatin (ZOCOR) 20 MG Tab, Take 1 Tablet by mouth every evening., Disp: 100 Tablet, Rfl: 3     "triamterene/hctz (MAXZIDE-25/DYAZIDE) 37.5-25 MG Cap, Take 1 Capsule by mouth every day., Disp: 100 Capsule, Rfl: 3    ROPINIRole (REQUIP) 0.25 MG Tab, TAKE 1 TABLET BY MOUTH EVERY DAY AT BEDTIME AS NEEDED, Disp: 90 tablet, Rfl: 1    Allergies, past medical history, past surgical history, family history, social history reviewed and updated    Objective:     Vitals: /70 (BP Location: Left arm, Patient Position: Sitting, BP Cuff Size: Adult)   Pulse 70   Temp 36.3 °C (97.4 °F) (Temporal)   Ht 1.753 m (5' 9\")   Wt 83.9 kg (185 lb)   SpO2 97%   BMI 27.32 kg/m²   General: Alert, pleasant, NAD  HEENT: Normocephalic. Neck supple.  No thyromegaly or masses palpated. No cervical or supraclavicular lymphadenopathy. No carotid bruits   Heart: Regular rate and rhythm.  S1 and S2 normal.  No murmurs appreciated.  Respiratory: Normal respiratory effort.  Clear to auscultation bilaterally.  Skin: Warm, dry, no rashes.  Extremities: No leg edema.  Radial pulses 2+ symmetric  Psych:  Affect/mood is normal, judgement is good, memory is intact, grooming is appropriate.    Assessment/Plan:     Karsten was seen today for medication follow-up.    Diagnoses and all orders for this visit:    Elevated fasting glucose  -Reviewed lifestyle modifications.  Increasing physical activity.  Reviewed diet control.  Continue to monitor repeat labs again in 6 months  -     HEMOGLOBIN A1C; Future  -     Comp Metabolic Panel; Future    Restless leg syndrome  -Stable.  Continue 0.25 mg of Requip as needed, does not take nightly  -     CBC WITH DIFFERENTIAL; Future    Pure hypercholesterolemia  -Has been controlled.  Continue 20 mg of simvastatin.  Due for labs in 6 months  -     Lipid Profile; Future    Seasonal allergic rhinitis due to pollen  -Flonase provides good benefit when allergy season starts.  Continue as needed  -     fluticasone (FLONASE) 50 MCG/ACT nasal spray; Administer 2 Sprays into affected nostril(S) every day.    Essential " hypertension  Controlled.  Continue 50 mg of losartan in addition to Maxide/Dyazide 37.5-25 mg daily.  Advised intermittent BP checks    Blood tests for routine general physical examination  -     Comp Metabolic Panel; Future  -     Lipid Profile; Future  -     CBC WITH DIFFERENTIAL; Future  -     TSH WITH REFLEX TO FT4; Future    Return in about 6 months (around 9/11/2025) for AWV, Lab Review.

## 2025-03-13 RX ORDER — FLUTICASONE PROPIONATE 50 MCG
2 SPRAY, SUSPENSION (ML) NASAL DAILY
Qty: 48 ML | Refills: 1 | Status: SHIPPED
Start: 2025-03-13 | End: 2025-03-25 | Stop reason: SDUPTHER

## 2025-03-22 DIAGNOSIS — K21.9 GASTROESOPHAGEAL REFLUX DISEASE, UNSPECIFIED WHETHER ESOPHAGITIS PRESENT: ICD-10-CM

## 2025-03-24 RX ORDER — OMEPRAZOLE 20 MG/1
20 CAPSULE, DELAYED RELEASE ORAL DAILY
Qty: 90 CAPSULE | Refills: 4 | Status: SHIPPED | OUTPATIENT
Start: 2025-03-24

## 2025-03-25 DIAGNOSIS — J30.1 SEASONAL ALLERGIC RHINITIS DUE TO POLLEN: ICD-10-CM

## 2025-03-25 DIAGNOSIS — G25.81 RESTLESS LEG SYNDROME: ICD-10-CM

## 2025-03-25 RX ORDER — ROPINIROLE 0.25 MG/1
0.25 TABLET, FILM COATED ORAL
Qty: 90 TABLET | Refills: 1 | Status: SHIPPED | OUTPATIENT
Start: 2025-03-25

## 2025-03-25 RX ORDER — FLUTICASONE PROPIONATE 50 MCG
2 SPRAY, SUSPENSION (ML) NASAL DAILY
Qty: 48 ML | Refills: 1 | Status: SHIPPED | OUTPATIENT
Start: 2025-03-25

## 2025-03-25 NOTE — TELEPHONE ENCOUNTER
Received request via: Patient    Was the patient seen in the last year in this department? Yes    Does the patient have an active prescription (recently filled or refills available) for medication(s) requested? No    Pharmacy Name: [facility] [employer]    Does the patient have care home Plus and need 100-day supply? (This applies to ALL medications) Patient does not have SCP

## 2025-04-03 DIAGNOSIS — J30.1 SEASONAL ALLERGIC RHINITIS DUE TO POLLEN: ICD-10-CM

## 2025-04-03 RX ORDER — FLUTICASONE PROPIONATE 50 MCG
2 SPRAY, SUSPENSION (ML) NASAL DAILY
Qty: 16 ML | Refills: 2 | Status: SHIPPED | OUTPATIENT
Start: 2025-04-03

## 2025-04-04 DIAGNOSIS — E78.00 PURE HYPERCHOLESTEROLEMIA: ICD-10-CM

## 2025-04-04 DIAGNOSIS — I10 ESSENTIAL HYPERTENSION: ICD-10-CM

## 2025-04-07 RX ORDER — SIMVASTATIN 20 MG
20 TABLET ORAL EVERY EVENING
Qty: 90 TABLET | Refills: 3 | Status: SHIPPED | OUTPATIENT
Start: 2025-04-07

## 2025-04-07 RX ORDER — TRIAMTERENE AND HYDROCHLOROTHIAZIDE 37.5; 25 MG/1; MG/1
1 CAPSULE ORAL
Qty: 90 CAPSULE | Refills: 3 | Status: SHIPPED | OUTPATIENT
Start: 2025-04-07

## 2025-04-30 DIAGNOSIS — J30.1 SEASONAL ALLERGIC RHINITIS DUE TO POLLEN: ICD-10-CM

## 2025-05-01 RX ORDER — FLUTICASONE PROPIONATE 50 MCG
2 SPRAY, SUSPENSION (ML) NASAL DAILY
Qty: 48 ML | Refills: 1 | Status: SHIPPED | OUTPATIENT
Start: 2025-05-01

## 2025-05-01 NOTE — TELEPHONE ENCOUNTER
Received request via: Pharmacy    Was the patient seen in the last year in this department? Yes    Does the patient have an active prescription (recently filled or refills available) for medication(s) requested? No    Pharmacy Name: [facility] pharmacy     Does the patient have [facility] Plus and need 100-day supply? (This applies to ALL medications) Patient does not have SCP

## 2025-06-13 ENCOUNTER — OFFICE VISIT (OUTPATIENT)
Dept: URGENT CARE | Facility: CLINIC | Age: 70
End: 2025-06-13
Payer: COMMERCIAL

## 2025-06-13 VITALS
WEIGHT: 185 LBS | HEART RATE: 65 BPM | BODY MASS INDEX: 27.4 KG/M2 | OXYGEN SATURATION: 97 % | RESPIRATION RATE: 18 BRPM | HEIGHT: 69 IN | SYSTOLIC BLOOD PRESSURE: 126 MMHG | DIASTOLIC BLOOD PRESSURE: 76 MMHG | TEMPERATURE: 97.4 F

## 2025-06-13 DIAGNOSIS — S39.011A ABDOMINAL MUSCLE STRAIN, INITIAL ENCOUNTER: Primary | ICD-10-CM

## 2025-06-13 PROCEDURE — 99213 OFFICE O/P EST LOW 20 MIN: CPT | Performed by: PHYSICIAN ASSISTANT

## 2025-06-13 PROCEDURE — 3074F SYST BP LT 130 MM HG: CPT | Performed by: PHYSICIAN ASSISTANT

## 2025-06-13 PROCEDURE — 3078F DIAST BP <80 MM HG: CPT | Performed by: PHYSICIAN ASSISTANT

## 2025-06-13 RX ORDER — CYCLOBENZAPRINE HCL 10 MG
10 TABLET ORAL 2 TIMES DAILY PRN
Qty: 20 TABLET | Refills: 0 | Status: SHIPPED | OUTPATIENT
Start: 2025-06-13

## 2025-06-13 ASSESSMENT — ENCOUNTER SYMPTOMS
SHORTNESS OF BREATH: 0
CHILLS: 0
WHEEZING: 0
ABDOMINAL PAIN: 0
FEVER: 0
NAUSEA: 0
HEADACHES: 0
MYALGIAS: 1
VOMITING: 0
DIARRHEA: 0
DIZZINESS: 0
COUGH: 0

## 2025-06-13 ASSESSMENT — FIBROSIS 4 INDEX: FIB4 SCORE: 1.62

## 2025-06-13 NOTE — PROGRESS NOTES
"Subjective     Karsten Cooper is a 70 y.o. male who presents with Muscle Pain (5 days ago pulled a muscle by right rib area)            Patient is here with complaints of a \"pulled muscle\" of his right lower rib and right upper abdominal area. He states he felt his muscle pull when loading packages full of water at home. His pain occurs with movement or lying down at night. He has been taking Excedrin without relief. He denies fever or chills. No nausea, vomiting or diarrhea.       Past Medical History[1]  Past Surgical History[2]    Family History   Problem Relation Age of Onset    Genetic Disorder Mother 75        GERD    Diabetes Father     Hypertension Brother     Diabetes Brother      Allergies:  Aspirin, Aspirin effervescent, Padmini-seltzer plus cold-cough, Apap-cpm-dm-pe, and Pollen extract    Medications, Allergies, and current problem list reviewed today in Epic      Review of Systems   Constitutional:  Negative for chills, fever and malaise/fatigue.   Respiratory:  Negative for cough, shortness of breath and wheezing.    Cardiovascular:  Negative for chest pain.   Gastrointestinal:  Negative for abdominal pain, diarrhea, nausea and vomiting.   Genitourinary:  Negative for dysuria.   Musculoskeletal:  Positive for myalgias (right lowre rib and upper abdomen).   Neurological:  Negative for dizziness and headaches.     All other systems reviewed and are negative.            Objective     /76   Pulse 65   Temp 36.3 °C (97.4 °F) (Temporal)   Resp 18   Ht 1.753 m (5' 9\")   Wt 83.9 kg (185 lb)   SpO2 97%   BMI 27.32 kg/m²      Physical Exam  Constitutional:       General: He is not in acute distress.     Appearance: He is not ill-appearing.   HENT:      Head: Normocephalic and atraumatic.   Eyes:      Conjunctiva/sclera: Conjunctivae normal.   Cardiovascular:      Rate and Rhythm: Normal rate and regular rhythm.   Pulmonary:      Effort: Pulmonary effort is normal. No respiratory distress.      " Breath sounds: No stridor. No wheezing.   Abdominal:      Palpations: Abdomen is soft.      Tenderness: There is no guarding or rebound.       Skin:     General: Skin is warm and dry.   Neurological:      General: No focal deficit present.      Mental Status: He is alert and oriented to person, place, and time.   Psychiatric:         Mood and Affect: Mood normal.         Behavior: Behavior normal.         Thought Content: Thought content normal.         Judgment: Judgment normal.                                  Assessment & Plan  Abdominal muscle strain, initial encounter    Orders:    cyclobenzaprine (FLEXERIL) 10 MG Tab; Take 1 Tablet by mouth 2 times a day as needed for Muscle Spasms.     OTC Tylenol    Differential diagnoses, Supportive care, and indications for immediate follow-up discussed with patient.   Pathogenesis of diagnosis discussed including typical length and natural progression.   Instructed to return to clinic or nearest emergency department for any change in condition, further concerns, or worsening of symptoms.    The patient demonstrated a good understanding and agreed with the treatment plan.    Dalia Barrera P.A.-C.                   [1]   Past Medical History:  Diagnosis Date    GERD (gastroesophageal reflux disease)     History of hiatal hernia 11/25/2019    S/p hiatal hernia repaired by Dr. Gu on July 12, 2019.    Hyperlipidemia     Hypertension     IFG (impaired fasting glucose) 5/10/2016   [2]   Past Surgical History:  Procedure Laterality Date    CATARACT PHACO WITH IOL Left 8/14/2017    Procedure: CATARACT PHACO WITH IOL;  Surgeon: Dwayne Ching M.D.;  Location: SURGERY SAME DAY [facility];  Service:     CATARACT PHACO WITH IOL Right 7/24/2017    Procedure: CATARACT PHACO WITH IOL KPE WITH PLANO SUPERIOR;  Surgeon: Dwayne Ching M.D.;  Location: SURGERY SAME DAY [facility];  Service:     GASTROSCOPY WITH BIOPSY  1/29/2014    Performed by Nick Salas M.D. at SURGERY  [facility]    EGD W/ENDOSCOPIC ULTRASOUND  2/21/2013    Performed by Nick Salas M.D. at SURGERY [facility]    GASTROSCOPY WITH BIOPSY  2/21/2013    Performed by Nick Salas M.D. at SURGERY [facility]    CT UPPER GI ENDOSCOPY,EXAM

## (undated) DEVICE — CON SEDATION/>5 YR 1ST 15 MIN

## (undated) DEVICE — BLADE BEAVER 7513 EYE 15 DEG 3.0MM DEPTH S/S UBL (6/CA)

## (undated) DEVICE — LACTATED RINGERS INJ 1000 ML - (14EA/CA 60CA/PF)

## (undated) DEVICE — NEEDLE FILTER ASPIRATION 18 GA X 1 1/2 IN (100EA/BX)

## (undated) DEVICE — KNIFE SLIT DUAL BEVEL ANGLED - (6/BX)

## (undated) DEVICE — TIP 45 DEG KELMAN 0.9MM - (6/BX)

## (undated) DEVICE — CATHETER IV 20 GA X 1-1/4 ---SURG.& SDS ONLY--- (50EA/BX)

## (undated) DEVICE — CARTRIDGE MONARCH C - (10EA/BX)

## (undated) DEVICE — TIP POLYMER I&A

## (undated) DEVICE — SODIUM CHL IRRIGATION 0.9% 1000ML (12EA/CA)

## (undated) DEVICE — SET LEADWIRE 5 LEAD BEDSIDE DISPOSABLE ECG (1SET OF 5/EA)

## (undated) DEVICE — LEAD SET 6 DISP. EKG NIHON KOHDEN

## (undated) DEVICE — GLOVE BIOGEL SZ 7.5 SURGICAL PF LTX - (50PR/BX 4BX/CA)

## (undated) DEVICE — NEEDLE CYSTOTOME OPTH VSTC  0.4MM X 16MM - (10/CA)

## (undated) DEVICE — KIT  I.V. START (100EA/CA)

## (undated) DEVICE — CANNULA W/ SUPPLY TUBING O2 - (50/CA)

## (undated) DEVICE — PACK BASIC CATARACT - (4/BX)

## (undated) DEVICE — TUBING CLEARLINK DUO-VENT - C-FLO (48EA/CA)

## (undated) DEVICE — ELECTRODE 850 FOAM ADHESIVE - HYDROGEL RADIOTRNSPRNT (50/PK)

## (undated) DEVICE — SENSOR SPO2 NEO LNCS ADHESIVE (20/BX) SEE USER NOTES

## (undated) DEVICE — PACK CATARACT GENERAL (4EA/CA)

## (undated) DEVICE — CON SEDATION EA ADDL 15 MIN

## (undated) DEVICE — SUTURE EYE

## (undated) DEVICE — GLOVE BIOGEL PI INDICATOR SZ 7.0 SURGICAL PF LF - (50/BX 4BX/CA)

## (undated) DEVICE — WATER IRRIGATION STERILE 1000ML (12EA/CA)

## (undated) DEVICE — GLOVE BIOGEL SZ 7 SURGICAL PF LTX - (50PR/BX 4BX/CA)